# Patient Record
Sex: MALE | Race: WHITE | Employment: OTHER | URBAN - METROPOLITAN AREA
[De-identification: names, ages, dates, MRNs, and addresses within clinical notes are randomized per-mention and may not be internally consistent; named-entity substitution may affect disease eponyms.]

---

## 2017-12-04 ASSESSMENT — LIFESTYLE VARIABLES: SMOKING_STATUS: 1

## 2017-12-04 NOTE — ANESTHESIA PRE-OP
Department of Anesthesiology  Preprocedure Note       Name:  Rufina Hernandez   Age:  81 y.o.  :  1945                                          MRN:  [id number]         Date:  2017      Surgeon:    Procedure:    Medications prior to admission:   Prior to Admission medications    Medication Sig Start Date End Date Taking?  Authorizing Provider   glimepiride (AMARYL) 4 MG tablet Take 4 mg by mouth every morning (before breakfast)    Historical Provider, MD   lisinopril (PRINIVIL;ZESTRIL) 10 MG tablet Take 10 mg by mouth daily    Historical Provider, MD   minocycline (MINOCIN;DYNACIN) 100 MG capsule Take 100 mg by mouth daily    Historical Provider, MD   metFORMIN (GLUCOPHAGE-XR) 500 MG extended release tablet Take 2,000 mg by mouth Daily with supper    Historical Provider, MD   lovastatin (ALTOPREV) 40 MG extended release tablet Take 40 mg by mouth Daily with supper    Historical Provider, MD   pioglitazone (ACTOS) 45 MG tablet Take 45 mg by mouth Daily with supper    Historical Provider, MD   aspirin 81 MG tablet Take 81 mg by mouth daily    Historical Provider, MD   Multiple Vitamins-Minerals (CENTRUM SILVER 50+MEN PO) Take by mouth    Historical Provider, MD   naproxen (NAPROSYN) 500 MG tablet Take 500 mg by mouth 2 times daily as needed for Pain    Historical Provider, MD       Current medications:    Current Outpatient Prescriptions   Medication Sig Dispense Refill    glimepiride (AMARYL) 4 MG tablet Take 4 mg by mouth every morning (before breakfast)      lisinopril (PRINIVIL;ZESTRIL) 10 MG tablet Take 10 mg by mouth daily      minocycline (MINOCIN;DYNACIN) 100 MG capsule Take 100 mg by mouth daily      metFORMIN (GLUCOPHAGE-XR) 500 MG extended release tablet Take 2,000 mg by mouth Daily with supper      lovastatin (ALTOPREV) 40 MG extended release tablet Take 40 mg by mouth Daily with supper      pioglitazone (ACTOS) 45 MG tablet Take 45 mg by mouth Daily with supper      aspirin 81 MG tablet Take 81 mg by mouth daily      Multiple Vitamins-Minerals (CENTRUM SILVER 50+MEN PO) Take by mouth      naproxen (NAPROSYN) 500 MG tablet Take 500 mg by mouth 2 times daily as needed for Pain       No current facility-administered medications for this encounter. Allergies: Allergies   Allergen Reactions    Bicillin [Penicillin G Benzathine] Rash       Problem List:  There is no problem list on file for this patient. Past Medical History:        Diagnosis Date    Diabetes mellitus ([address]     Hyperlipidemia     Hypertension        Past Surgical History:        Procedure Laterality Date    CHOLECYSTECTOMY  1999    COLONOSCOPY  10/22/2003    polyp x1    COLONOSCOPY  2010    Normal exam    CYST INCISION AND DRAINAGE Right 2013    FRACTURE SURGERY Right 1969    tibia    FRACTURE SURGERY Left 1994    shoulder    FRACTURE SURGERY  2011    Nose & facial bones    HERNIA REPAIR  2022    Umbilical       Social History:    Social History   Substance Use Topics    Smoking status: Current Some Day Smoker     Types: Cigars    Smokeless tobacco: Never Used    Alcohol use Yes      Comment: occasionally                                Ready to quit: Not Answered  Counseling given: Not Answered      Vital Signs (Current): There were no vitals filed for this visit. BP Readings from Last 3 Encounters:   No data found for BP       NPO Status:                                                                                 BMI:   Wt Readings from Last 3 Encounters:   11/29/17 208 lb (94.3 kg)     There is no height or weight on file to calculate BMI.     Anesthesia Evaluation  Patient summary reviewed no history of anesthetic complications:   Airway:         Dental:          Pulmonary:   (+) current smoker                           Cardiovascular:    (+) hypertension:,                   Neuro/Psych:               GI/Hepatic/Renal:             Endo/Other:    (+) Type II DM, , .                 Abdominal:           Vascular:                                        Anesthesia Plan      general, MAC and TIVA     ASA 2     (Chart review only.)  Induction: intravenous. Danni MillwoodRAJ   12/4/2017      =====================  Addendum  ================================    Patient records were reviewed and the patient seen and evaluated. Most Recent Results:  /71   Pulse 76   Temp 96.9 °F (36.1 °C) (Temporal)   Resp 16   Ht 5' 10\" (1.778 m)   Wt 197 lb (89.4 kg)   SpO2 100%   BMI 28.27 kg/m²     Lab Results   Component Value Date/Time    WBC 9.3 10/10/2011 05:02 PM    HGB 14.2 10/10/2011 05:02 PM    HCT 41.8 (L) 10/10/2011 05:02 PM     10/10/2011 05:02 PM     10/10/2011 05:02 PM    K 4.5 10/10/2011 05:02 PM     10/10/2011 05:02 PM    CO2 27 10/10/2011 05:02 PM    BUN 16 10/10/2011 05:02 PM    CREATININE 0.8 (L) 10/10/2011 05:02 PM       Anesthesiology Focused Physical examination:   Airway:  MP2   Cardiac: RRR, no murmur   Pulmonary: Clear    Assessment:  · Beta-blockers: none  · NPO- per guidelines, confirmed  · Physical status: 2    Plan:  · Proceed with surgery  · MAC/TIVA  · Antibiotics-none indicated. Informed consent obtained. All questions were addressed.  The above has been discussed with the CRNA involved in this patient's anesthesia care team.    Tristan Foley MD

## 2017-12-05 ENCOUNTER — HOSPITAL ENCOUNTER (OUTPATIENT)
Dept: SURGERY | Age: 72
Discharge: HOME | End: 2017-12-05
Attending: SPECIALIST | Admitting: SPECIALIST

## 2017-12-05 VITALS
TEMPERATURE: 97.4 F | HEART RATE: 62 BPM | DIASTOLIC BLOOD PRESSURE: 79 MMHG | RESPIRATION RATE: 16 BRPM | BODY MASS INDEX: 28.2 KG/M2 | HEIGHT: 70 IN | SYSTOLIC BLOOD PRESSURE: 136 MMHG | WEIGHT: 197 LBS | OXYGEN SATURATION: 100 %

## 2017-12-05 LAB — GLUCOSE BLD-MCNC: 144 MG/DL (ref 70–99)

## 2017-12-05 RX ORDER — SODIUM CHLORIDE, SODIUM LACTATE, POTASSIUM CHLORIDE, CALCIUM CHLORIDE 600; 310; 30; 20 MG/100ML; MG/100ML; MG/100ML; MG/100ML
INJECTION, SOLUTION INTRAVENOUS CONTINUOUS
Status: DISCONTINUED | OUTPATIENT
Start: 2017-12-05 | End: 2017-12-06 | Stop reason: HOSPADM

## 2017-12-05 RX ADMIN — SODIUM CHLORIDE, SODIUM LACTATE, POTASSIUM CHLORIDE, CALCIUM CHLORIDE: 600; 310; 30; 20 INJECTION, SOLUTION INTRAVENOUS at 10:31

## 2017-12-05 ASSESSMENT — PAIN SCALES - GENERAL
PAINLEVEL_OUTOF10: 0
PAINLEVEL_OUTOF10: 0

## 2017-12-05 ASSESSMENT — PAIN - FUNCTIONAL ASSESSMENT: PAIN_FUNCTIONAL_ASSESSMENT: 0-10

## 2017-12-05 NOTE — ANESTHESIA POST-OP
Anesthesia Post-op Note    Patient:    Ana Brooks  MRN:     [id number]   YOB: 1945    Anesthesia Post Evaluation    Final anesthesia type:  MAC-TIVA  Location of evaluation:  [facility]  Patient participation:   complete - patient participated  Level of consciousness:  awake  Pain score:    0  Airway patency:   patent  Nausea & Vomiting:   no nausea and no vomiting  Complications:  no  Cardiovascular status:  blood pressure returned to baseline  Respiratory status:   acceptable  Hydration status:   euvolemic    Que Maguire MD

## 2017-12-05 NOTE — H&P
Original H &P in soft chart. I have examined the patient immediately before the procedure and there is no change in the previous history  and physical exam, which has been reviewed. There is no history of sleep apnea, snoring, or stridor. There has been no  previous adverse experience with sedation/anesthesia. There is no increased risk for aspiration of gastric contents. The patient has been instructed that all resuscitative measures will be instituted in the unlikely event that they will be needed.     ASA Class: 2  AIRWAY Class: 1

## 2020-01-27 ENCOUNTER — PROCEDURE VISIT (OUTPATIENT)
Dept: CARDIOLOGY CLINIC | Age: 75
End: 2020-01-27
Payer: MEDICARE

## 2020-01-27 VITALS
HEART RATE: 90 BPM | DIASTOLIC BLOOD PRESSURE: 60 MMHG | BODY MASS INDEX: 29.7 KG/M2 | HEIGHT: 68 IN | WEIGHT: 196 LBS | SYSTOLIC BLOOD PRESSURE: 136 MMHG

## 2020-01-27 LAB
LV EF: 58 %
LVEF MODALITY: NORMAL

## 2020-01-27 PROCEDURE — 93306 TTE W/DOPPLER COMPLETE: CPT | Performed by: INTERNAL MEDICINE

## 2020-01-27 PROCEDURE — 93015 CV STRESS TEST SUPVJ I&R: CPT | Performed by: INTERNAL MEDICINE

## 2020-07-29 ENCOUNTER — NURSE ONLY (OUTPATIENT)
Dept: CARDIOLOGY CLINIC | Age: 75
End: 2020-07-29
Payer: MEDICARE

## 2020-07-29 PROCEDURE — 93225 XTRNL ECG REC<48 HRS REC: CPT | Performed by: INTERNAL MEDICINE

## 2020-07-29 NOTE — PROGRESS NOTES
Applied @ 5P. M., hr holter w/monitor# [id number] for Dx of pre-syncope. Educated pt on proper holter usage; how to keep sx diary; & when to bring monitor back to office. Pt voiced understanding. Holter order,including monitor & card#, & time started, to front nurse's station in 's in-box.

## 2020-08-05 PROCEDURE — 93227 XTRNL ECG REC<48 HR R&I: CPT | Performed by: INTERNAL MEDICINE

## 2021-07-30 LAB
ALBUMIN SERPL-MCNC: 3.5 G/DL
ALP BLD-CCNC: 204 U/L
ALT SERPL-CCNC: 55 U/L
ANION GAP SERPL CALCULATED.3IONS-SCNC: NORMAL MMOL/L
AST SERPL-CCNC: 51 U/L
BASOPHILS ABSOLUTE: 0 /ΜL
BASOPHILS RELATIVE PERCENT: 0.2 %
BILIRUB SERPL-MCNC: 0.6 MG/DL (ref 0.1–1.4)
BUN BLDV-MCNC: 11 MG/DL
CALCIUM SERPL-MCNC: 8.7 MG/DL
CHLORIDE BLD-SCNC: 96 MMOL/L
CO2: 29 MMOL/L
CREAT SERPL-MCNC: 0.9 MG/DL
EOSINOPHILS ABSOLUTE: 0.1 /ΜL
EOSINOPHILS RELATIVE PERCENT: 1.1 %
GFR CALCULATED: NORMAL
GLUCOSE BLD-MCNC: 351 MG/DL
HCT VFR BLD CALC: 34.4 % (ref 41–53)
HEMOGLOBIN: 11.4 G/DL (ref 13.5–17.5)
LYMPHOCYTES ABSOLUTE: 1.5 /ΜL
LYMPHOCYTES RELATIVE PERCENT: 14.8 %
MCH RBC QN AUTO: 30.6 PG
MCHC RBC AUTO-ENTMCNC: 33.1 G/DL
MCV RBC AUTO: 92.5 FL
MONOCYTES ABSOLUTE: 0.9 /ΜL
MONOCYTES RELATIVE PERCENT: 8.5 %
NEUTROPHILS ABSOLUTE: 7.8 /ΜL
NEUTROPHILS RELATIVE PERCENT: 74.7 %
PLATELET # BLD: 304 K/ΜL
PMV BLD AUTO: ABNORMAL FL
POTASSIUM SERPL-SCNC: 4.4 MMOL/L
RBC # BLD: 3.72 10^6/ΜL
SODIUM BLD-SCNC: 134 MMOL/L
TOTAL PROTEIN: 6
WBC # BLD: 10.4 10^3/ML

## 2021-08-23 ENCOUNTER — PROCEDURE VISIT (OUTPATIENT)
Dept: CARDIOLOGY CLINIC | Age: 76
End: 2021-08-23
Payer: MEDICARE

## 2021-08-23 DIAGNOSIS — R55 SYNCOPE, UNSPECIFIED SYNCOPE TYPE: ICD-10-CM

## 2021-08-23 DIAGNOSIS — E78.2 MIXED HYPERLIPIDEMIA: ICD-10-CM

## 2021-08-23 DIAGNOSIS — R07.9 CHEST PAIN, UNSPECIFIED TYPE: Primary | ICD-10-CM

## 2021-08-23 DIAGNOSIS — I10 ESSENTIAL HYPERTENSION: ICD-10-CM

## 2021-08-23 LAB
LV EF: 55 %
LVEF MODALITY: NORMAL

## 2021-08-23 PROCEDURE — 78452 HT MUSCLE IMAGE SPECT MULT: CPT | Performed by: INTERNAL MEDICINE

## 2021-08-23 PROCEDURE — 93015 CV STRESS TEST SUPVJ I&R: CPT | Performed by: INTERNAL MEDICINE

## 2021-08-23 PROCEDURE — A9500 TC99M SESTAMIBI: HCPCS | Performed by: INTERNAL MEDICINE

## 2021-08-23 NOTE — PROGRESS NOTES
8: 17 AM    8/23/21    Site: forearm right, condition patent and no redness    # of attempts: 1
patient's belongings returned

## 2021-08-24 ENCOUNTER — TELEPHONE (OUTPATIENT)
Dept: CARDIOLOGY CLINIC | Age: 76
End: 2021-08-24

## 2021-08-24 NOTE — TELEPHONE ENCOUNTER
Faxed results to Dr. Deondre Cook office. Called office and spoke to them about patient's results and doctor recommendations. States they will let patient know. Abnormal Study. Limited exercise capacity. 5 METs work load. Physiological BP response to exercise. ETT negative for Ischemia / Arrhythmia. Medium sized area of moderate inferior wall Ischemia. Normal LV function. LVEF is 55 %. Supervising physician Dr. Sreedhar Francis . Recommendations: Schedule out patient visit to discuss results.
5

## 2021-08-30 ENCOUNTER — INITIAL CONSULT (OUTPATIENT)
Dept: CARDIOLOGY CLINIC | Age: 76
End: 2021-08-30
Payer: MEDICARE

## 2021-08-30 VITALS
HEART RATE: 94 BPM | DIASTOLIC BLOOD PRESSURE: 78 MMHG | HEIGHT: 68 IN | BODY MASS INDEX: 27.04 KG/M2 | SYSTOLIC BLOOD PRESSURE: 148 MMHG | WEIGHT: 178.4 LBS

## 2021-08-30 DIAGNOSIS — I10 ESSENTIAL HYPERTENSION: ICD-10-CM

## 2021-08-30 DIAGNOSIS — R55 SYNCOPE, UNSPECIFIED SYNCOPE TYPE: ICD-10-CM

## 2021-08-30 DIAGNOSIS — E78.2 MIXED HYPERLIPIDEMIA: ICD-10-CM

## 2021-08-30 DIAGNOSIS — R07.9 CHEST PAIN, UNSPECIFIED TYPE: ICD-10-CM

## 2021-08-30 DIAGNOSIS — Z01.810 PRE-OPERATIVE CARDIOVASCULAR EXAMINATION: Primary | ICD-10-CM

## 2021-08-30 DIAGNOSIS — E11.9 TYPE 2 DIABETES MELLITUS WITHOUT COMPLICATION, WITHOUT LONG-TERM CURRENT USE OF INSULIN (HCC): ICD-10-CM

## 2021-08-30 PROCEDURE — G8417 CALC BMI ABV UP PARAM F/U: HCPCS | Performed by: INTERNAL MEDICINE

## 2021-08-30 PROCEDURE — 2022F DILAT RTA XM EVC RTNOPTHY: CPT | Performed by: INTERNAL MEDICINE

## 2021-08-30 PROCEDURE — 99204 OFFICE O/P NEW MOD 45 MIN: CPT | Performed by: INTERNAL MEDICINE

## 2021-08-30 PROCEDURE — G8427 DOCREV CUR MEDS BY ELIG CLIN: HCPCS | Performed by: INTERNAL MEDICINE

## 2021-08-30 RX ORDER — SITAGLIPTIN 100 MG/1
1 TABLET, FILM COATED ORAL DAILY
COMMUNITY
Start: 2021-08-02

## 2021-08-30 RX ORDER — ISOSORBIDE MONONITRATE 30 MG/1
30 TABLET, EXTENDED RELEASE ORAL DAILY
Qty: 30 TABLET | Refills: 3 | Status: ON HOLD | OUTPATIENT
Start: 2021-08-30 | End: 2021-09-14 | Stop reason: HOSPADM

## 2021-08-30 RX ORDER — PRAVASTATIN SODIUM 40 MG
1 TABLET ORAL DAILY
COMMUNITY
Start: 2021-08-28

## 2021-08-30 RX ORDER — NITROGLYCERIN 0.4 MG/1
TABLET SUBLINGUAL PRN
Status: ON HOLD | COMMUNITY
Start: 2021-08-26 | End: 2021-09-14 | Stop reason: HOSPADM

## 2021-08-30 RX ORDER — LATANOPROST 50 UG/ML
1 SOLUTION/ DROPS OPHTHALMIC DAILY
COMMUNITY
Start: 2021-07-15

## 2021-08-30 RX ORDER — TAMSULOSIN HYDROCHLORIDE 0.4 MG/1
0.8 CAPSULE ORAL DAILY
COMMUNITY
Start: 2021-07-23 | End: 2022-09-07

## 2021-08-30 NOTE — ASSESSMENT & PLAN NOTE
Advised to check blood pressure at home start metoprolol 25 twice daily for cardiac protection and to help with angina

## 2021-08-30 NOTE — PATIENT INSTRUCTIONS
**It is YOUR responsibilty to bring medication bottles and/or updated medication list to [address] [place] [address]. This will allow us to better serve you and all your healthcare needs**      Please be informed that if you contact our office outside of normal business hours the physician on call cannot help with any scheduling or rescheduling issues, procedure instruction questions or any type of medication issue. We advise you for any urgent/emergency that you go to the nearest emergency room!     PLEASE CALL OUR OFFICE DURING NORMAL BUSINESS HOURS    Monday - Friday   8 am to 5 pm    [place]: [address]: [phone]    [place]:  [phone]

## 2021-08-30 NOTE — ASSESSMENT & PLAN NOTE
Stress test shows inferior ischemia we went over the findings stress test results were reviewed and images were reviewed. .  Also start metoprolol 25 mg twice daily and Imdur will proceed with cardiac catheterization to evaluate coronary anatomy  Continue nitro as needed we went over the pathophysiology and risk factor modification

## 2021-08-30 NOTE — PROGRESS NOTES
CARDIOLOGY CONSULT   NOTE    Chief Complaint: Chest pain abnormal stress test    HPI:   Rosenda Elder is a 78y.o. year old who has Past medical history as noted below. Very pleasant gentleman who comes in for evaluationDue to concerns for intermittent chest discomfort radiating to his jaw and back and got worse with no clear exertion factors he underwent stress test which showed inferior ischemia although he completed 5 METS. he has history of diabetes.   Due to ongoing palpitations he had Holter monitor in July 2020 which did not show any significant arrhythmias  Blood pressure is elevated today but he says normally his blood pressure is not elevated  He is on nitro as needed but has not needed to use it so far      Current Outpatient Medications   Medication Sig Dispense Refill    latanoprost (XALATAN) 0.005 % ophthalmic solution Apply 1 drop to eye daily      nitroGLYCERIN (NITROSTAT) 0.4 MG SL tablet as needed      pravastatin (PRAVACHOL) 40 MG tablet 1 tablet daily      tamsulosin (FLOMAX) 0.4 MG capsule Take 0.8 mg by mouth daily      JANUVIA 100 MG tablet 1 tablet daily      Coenzyme Q10 (CO Q 10 PO) Take by mouth daily      Multiple Vitamins-Minerals (OCUVITE ADULT 50+ PO) Take by mouth daily      metoprolol tartrate (LOPRESSOR) 25 MG tablet Take 1 tablet by mouth 2 times daily 60 tablet 0    isosorbide mononitrate (IMDUR) 30 MG extended release tablet Take 1 tablet by mouth daily 30 tablet 3    glimepiride (AMARYL) 4 MG tablet Take 4 mg by mouth every morning (before breakfast)      lisinopril (PRINIVIL;ZESTRIL) 5 MG tablet Take 5 mg by mouth daily       minocycline (MINOCIN;DYNACIN) 100 MG capsule Take 100 mg by mouth daily      metFORMIN (GLUCOPHAGE-XR) 500 MG extended release tablet Take 2,000 mg by mouth Daily with supper      pioglitazone (ACTOS) 45 MG tablet Take 45 mg by mouth Daily with supper      aspirin 81 MG tablet Take 81 mg by mouth daily  Multiple Vitamins-Minerals (CENTRUM SILVER 50+MEN PO) Take by mouth      naproxen (NAPROSYN) 500 MG tablet Take 500 mg by mouth 2 times daily as needed for Pain       No current facility-administered medications for this visit. Allergies:   Clarithromycin, Levaquin [levofloxacin], and Bicillin [penicillin g benzathine]    Patient History:  Past Medical History:   Diagnosis Date    Abnormal Heart Score CT 03/04/2005    Diabetes mellitus ([address]     Elevated d-dimer 01/16/2020    Fatigue 06/18/2018    H/O echocardiogram 01/27/2020    EF 19-12, Grade I diastolic dysfunction.  History of exercise stress test 01/27/2020    Treadmill, normal stress test, THR achieved    History of nuclear stress test 08/23/2021    EF 55%, ETT negative for ischemia/arrhythmia, Medium sized area of moderate inferior wall ischemia    Hyperlipidemia     Hypertension     Obesity 02/15/2000    Psoriasis 03/09/2004     Past Surgical History:   Procedure Laterality Date    CHOLECYSTECTOMY  1999    COLONOSCOPY  10/22/2003    polyp x1    COLONOSCOPY  2010    Normal exam    COLONOSCOPY  12/05/2017    single 2mm polyp found in proximal ascending colon, diverticulosis found in sigmoid colon    CYST INCISION AND DRAINAGE Right 2013    FRACTURE SURGERY Right 1969    tibia    FRACTURE SURGERY Left 1994    shoulder    FRACTURE SURGERY  2011    Nose & facial bones    HERNIA REPAIR  6383    Umbilical     Family History   Problem Relation Age of Onset    Stroke Mother     Diabetes Father     Diabetes Brother     Diabetes Paternal Grandmother     Diabetes Brother     Diabetes Brother      Social History     Tobacco Use    Smoking status: Current Some Day Smoker     Types: Cigars    Smokeless tobacco: Never Used    Tobacco comment: also former cigarette smoker.  8/30/2021   Substance Use Topics    Alcohol use: Yes     Comment: occasionally        Review of Systems:   · Constitutional: No Fever or Weight Loss   · Eyes: No Decreased Vision  · ENT: No Headaches, Hearing Loss or Vertigo  · Cardiovascular: as per note above   · Respiratory: No cough or wheezing and as per note above. · Gastrointestinal: No abdominal pain, appetite loss, blood in stools, constipation, diarrhea or heartburn  · Genitourinary: No dysuria, trouble voiding, or hematuria  · Musculoskeletal:  denies any new  joint aches , swelling  or pain   · Integumentary: No rash or pruritis  · Neurological: No TIA or stroke symptoms  · Psychiatric: No anxiety or depression  · Endocrine: No malaise, fatigue or temperature intolerance  · Hematologic/Lymphatic: No bleeding problems, blood clots or swollen lymph nodes  · Allergic/Immunologic: No nasal congestion or hives    Objective:      Physical Exam:  BP (!) 148/78 (Site: Left Upper Arm, Position: Sitting, Cuff Size: Medium Adult)   Pulse 94   Ht 5' 8\" (1.727 m)   Wt 178 lb 6.4 oz (80.9 kg)   BMI 27.13 kg/m²   Wt Readings from Last 3 Encounters:   08/30/21 178 lb 6.4 oz (80.9 kg)   01/27/20 196 lb (88.9 kg)   12/05/17 197 lb (89.4 kg)     Body mass index is 27.13 kg/m². Vitals:    08/30/21 1201   BP: (!) 148/78   Pulse:         General Appearance:  No distress, conversant  Constitutional:  Well developed, Well nourished, No acute distress, Non-toxic appearance. HENT:  Normocephalic, Atraumatic, Bilateral external ears normal, Oropharynx moist, No oral exudates, Nose normal. Neck- Normal range of motion, No tenderness, Supple, No stridor,no apical-carotid delay  Eyes:  PERRL, EOMI, Conjunctiva normal, No discharge. Respiratory:  Normal breath sounds, No respiratory distress, No wheezing, No chest tenderness. ,no use of accessory muscles, NO crackles  Cardiovascular: (PMI) apex non displaced,no lifts no thrills,S1 and S2 audible, No added heart sounds, No signs of ankle edema, or volume overload, No evidence of JVD, No crackles  GI:  Bowel sounds normal, Soft, No tenderness, No masses, No gross visceromegaly   : No costovertebral angle tenderness   Musculoskeletal:  No edema, no tenderness, no deformities. Back- no tenderness  Integument:  Well hydrated, no rash   Lymphatic:  No lymphadenopathy noted   Neurologic:  Alert & oriented x 3, CN 2-12 normal, normal motor function, normal sensory function, no focal deficits noted   Psychiatric:  Speech and behavior appropriate       Medical decision making and Data review:  DATA:  No results found for: TROPONINT  BNP:  No results found for: PROBNP  PT/INR:  No results found for: PTINR  No results found for: LABA1C  No results found for: CHOL, TRIG, HDL, LDLCALC, LDLDIRECT  Lab Results   Component Value Date    ALT 55 07/30/2021    AST 51 07/30/2021     No results for input(s): WBC, HGB, HCT, MCV, PLT in the last 72 hours. TSH: No results found for: TSH  Lab Results   Component Value Date    AST 51 07/30/2021    ALT 55 07/30/2021    BILITOT 0.6 07/30/2021    ALKPHOS 204 07/30/2021     No results found for: PROBNP  No results found for: LABA1C  Lab Results   Component Value Date    WBC 10.4 07/30/2021    HGB 11.4 (A) 07/30/2021    HCT 34.4 (A) 07/30/2021     07/30/2021     Stress test  8/23/21      Summary    Abnormal Study.    Limited exercise capacity. 5 METs work load.    Physiological BP response to exercise.    ETT negative for Ischemia / Arrhythmia.    Medium sized area of moderate inferior wall Ischemia.    Normal LV function. LVEF is 55 %.    Supervising physician Dr. Chi Beaver .                 All labs, medications and tests reviewed by myself including data and history from outside source , patient and available family . Assessment & Plan:      1. Pre-operative cardiovascular examination    2. Chest pain, unspecified type    3. Syncope, unspecified syncope type    4. Mixed hyperlipidemia    5. Essential hypertension    6.  Type 2 diabetes mellitus without complication, without long-term current use of insulin (HCC)         Chest pain   Stress test shows inferior ischemia we went over the findings stress test results were reviewed and images were reviewed. .  Also start metoprolol 25 mg twice daily and Imdur will proceed with cardiac catheterization to evaluate coronary anatomy  Continue nitro as needed we went over the pathophysiology and risk factor modification  Alternates and risk of the procedure were dicussed in detail  Patient is in agreement to proceed  Mallampati is 2  ASA is 3    Hypertension   Advised to check blood pressure at home start metoprolol 25 twice daily for cardiac protection and to help with angina    Di[address] mellitus [address] [address]   Followed closely by Dr. Matteo Saini     Dyslipidemia :  All available lab work was reviewed. Patient was advised to repeat lab work before next visit. Necessary orders were placed , instructions given by myself       Counseled extensively and medication compliance urged. We discussed that for the  prevention of ASCVD our  goal is aggressive risk modification. Patient is encouraged to exercise if they can , educated about  brisk walk for 30 minutes  at least 3 to 4 times a week if there are no physical limitations  Various goals were discussed and questions answered. Continue current medications. Appropriate prescriptions are addressed and refills ordered. Questions answered and patient verbalizes understanding. Call for any problems, questions, or concerns. Greater than 60 % of time spent counseling besides reviewing data and images     Continue all other medications of all above medical condition listed as is. Return in about 1 month (around 9/30/2021). Please note this report has been partially produced using speech recognition software and may contain errors related to that system including errors in grammar, punctuation, and spelling, as well as words and phrases that may be inappropriate. If there are any questions or concerns please feel free to contact the dictating provider for clarification.

## 2021-08-30 NOTE — LETTER
Edgardo Velasco [birth year]  1945  [id number]    Have you had any Chest Pain that is not new? - Yes, ongoing, occasional, worsened over past 6 months  If Yes DO EKG - How does it feel - Tightness   How long does the pain last - 2 minutes    How long have you been having the pain - Months   Did you take a NONE   And did it relieve the pain - it goes away on it's own    Have you had any Shortness of Breath - Yes  If Yes - When on exertion    Have you had any dizziness - Yes  If Yes DO ORTHOSTATIC BP - when do you feel dizzy with position change, walking, exertion  How long does it last .would continue as long as pt was walking  ? Have you had any palpitations that are not new? - Yes  If Yes DO EKG - Do you feel your heart pounding  How long does it last - .3  seconds     Do you have any edema - swelling in No    If Yes - CHECK TO SEE IF THE EDEMA IS PITTING    When did you have your last labs drawn 7/2021, Lipids not in [facility] or Care Everywhere  Where did you have them done DR. Michael Benjamin  What doctor ordered Dr. Michael Benjamin    If we do not have these labs you are retrieve these labs for these providers!     Do you have a surgery or procedure scheduled in the near future - No  If Yes- DO EKG

## 2021-09-02 ENCOUNTER — NURSE ONLY (OUTPATIENT)
Dept: CARDIOLOGY CLINIC | Age: 76
End: 2021-09-02
Payer: MEDICARE

## 2021-09-02 ENCOUNTER — HOSPITAL ENCOUNTER (OUTPATIENT)
Age: 76
Discharge: HOME | End: 2021-09-02
Payer: MEDICARE

## 2021-09-02 ENCOUNTER — HOSPITAL ENCOUNTER (OUTPATIENT)
Age: 76
Setting detail: SPECIMEN
Discharge: HOME | End: 2021-09-02
Payer: MEDICARE

## 2021-09-02 ENCOUNTER — HOSPITAL ENCOUNTER (OUTPATIENT)
Dept: GENERAL RADIOLOGY | Age: 76
Discharge: HOME | End: 2021-09-02
Payer: MEDICARE

## 2021-09-02 VITALS — TEMPERATURE: 96.3 F | DIASTOLIC BLOOD PRESSURE: 76 MMHG | SYSTOLIC BLOOD PRESSURE: 128 MMHG

## 2021-09-02 DIAGNOSIS — Z01.810 PRE-OPERATIVE CARDIOVASCULAR EXAMINATION: ICD-10-CM

## 2021-09-02 LAB
ABO/RH: NORMAL
ANION GAP SERPL CALCULATED.3IONS-SCNC: 10 MMOL/L (ref 4–16)
ANTIBODY SCREEN: NEGATIVE
BASOPHILS ABSOLUTE: 0 K/CU MM
BASOPHILS RELATIVE PERCENT: 0.3 % (ref 0–1)
BUN BLDV-MCNC: 19 MG/DL (ref 6–23)
CALCIUM SERPL-MCNC: 9.5 MG/DL (ref 8.3–10.6)
CHLORIDE BLD-SCNC: 101 MMOL/L (ref 99–110)
CO2: 27 MMOL/L (ref 21–32)
COMMENT: NORMAL
CREAT SERPL-MCNC: 1.3 MG/DL (ref 0.9–1.3)
DIFFERENTIAL TYPE: ABNORMAL
EOSINOPHILS ABSOLUTE: 0.1 K/CU MM
EOSINOPHILS RELATIVE PERCENT: 2.1 % (ref 0–3)
GFR AFRICAN AMERICAN: >60 ML/MIN/1.73M2
GFR NON-AFRICAN AMERICAN: 54 ML/MIN/1.73M2
GLUCOSE BLD-MCNC: 230 MG/DL (ref 70–99)
HCT VFR BLD CALC: 37.8 % (ref 42–52)
HEMOGLOBIN: 12.2 GM/DL (ref 13.5–18)
IMMATURE NEUTROPHIL %: 0.3 % (ref 0–0.43)
LYMPHOCYTES ABSOLUTE: 1.5 K/CU MM
LYMPHOCYTES RELATIVE PERCENT: 25.4 % (ref 24–44)
MCH RBC QN AUTO: 30.3 PG (ref 27–31)
MCHC RBC AUTO-ENTMCNC: 32.3 % (ref 32–36)
MCV RBC AUTO: 94 FL (ref 78–100)
MONOCYTES ABSOLUTE: 0.5 K/CU MM
MONOCYTES RELATIVE PERCENT: 8.4 % (ref 0–4)
NUCLEATED RBC %: 0 %
PDW BLD-RTO: 13 % (ref 11.7–14.9)
PLATELET # BLD: 145 K/CU MM (ref 140–440)
PMV BLD AUTO: 11.5 FL (ref 7.5–11.1)
POTASSIUM SERPL-SCNC: 5.3 MMOL/L (ref 3.5–5.1)
RBC # BLD: 4.02 M/CU MM (ref 4.6–6.2)
SEGMENTED NEUTROPHILS ABSOLUTE COUNT: 3.7 K/CU MM
SEGMENTED NEUTROPHILS RELATIVE PERCENT: 63.5 % (ref 36–66)
SODIUM BLD-SCNC: 138 MMOL/L (ref 135–145)
TOTAL IMMATURE NEUTOROPHIL: 0.02 K/CU MM
TOTAL NUCLEATED RBC: 0 K/CU MM
WBC # BLD: 5.8 K/CU MM (ref 4–10.5)

## 2021-09-02 PROCEDURE — 36415 COLL VENOUS BLD VENIPUNCTURE: CPT

## 2021-09-02 PROCEDURE — 80048 BASIC METABOLIC PNL TOTAL CA: CPT

## 2021-09-02 PROCEDURE — 86900 BLOOD TYPING SEROLOGIC ABO: CPT

## 2021-09-02 PROCEDURE — 86901 BLOOD TYPING SEROLOGIC RH(D): CPT

## 2021-09-02 PROCEDURE — U0005 INFEC AGEN DETEC AMPLI PROBE: HCPCS

## 2021-09-02 PROCEDURE — 86850 RBC ANTIBODY SCREEN: CPT

## 2021-09-02 PROCEDURE — 99211 OFF/OP EST MAY X REQ PHY/QHP: CPT | Performed by: INTERNAL MEDICINE

## 2021-09-02 PROCEDURE — 85025 COMPLETE CBC W/AUTO DIFF WBC: CPT

## 2021-09-02 PROCEDURE — 71046 X-RAY EXAM CHEST 2 VIEWS: CPT

## 2021-09-02 PROCEDURE — U0003 INFECTIOUS AGENT DETECTION BY NUCLEIC ACID (DNA OR RNA); SEVERE ACUTE RESPIRATORY SYNDROME CORONAVIRUS 2 (SARS-COV-2) (CORONAVIRUS DISEASE [COVID-19]), AMPLIFIED PROBE TECHNIQUE, MAKING USE OF HIGH THROUGHPUT TECHNOLOGIES AS DESCRIBED BY CMS-2020-01-R: HCPCS

## 2021-09-02 NOTE — PROGRESS NOTES
Patient informed of instructions and guidance for performing test.  Throat swab performed. Swab placed into labeled collection tube. Collection tube and lab order placed in plastic bag. Bag placed in biohazard bag and placed in fridge.  called for . Patient here in office & educated on [facility] for Dx: ABN NM, scheduled for 9/8/21 @ 12, w/arrival @ 10 AM, @ [facility]. Risks explained; & consents signed. Pre-admission orders given to pt for labs & CXR, which are due 9/2/21 OR 9/3/21 @ [facility]. Instructions given to pt to: yuliana QURESHI after midnight the night before procedure. Patient to call hospital @ [phone] to pre-register. May take morning meds the morning of procedure. Patient was notified that procedure could be delayed due to an emergency. Patient voiced understanding. Copies of consent, pre-testing orders, & info. sheet scanned into media.

## 2021-09-03 LAB
SARS-COV-2: NOT DETECTED
SOURCE: NORMAL

## 2021-09-07 ENCOUNTER — TELEPHONE (OUTPATIENT)
Dept: CARDIOLOGY CLINIC | Age: 76
End: 2021-09-07

## 2021-09-07 DIAGNOSIS — E78.2 MIXED HYPERLIPIDEMIA: Primary | ICD-10-CM

## 2021-09-07 NOTE — TELEPHONE ENCOUNTER
----- Message from Carrie Ahumada MD sent at 9/2/2021  5:01 PM EDT -----  Repeat k on day of procedure

## 2021-09-07 NOTE — TELEPHONE ENCOUNTER
Spoke with Ed Zamora and was told to put new order for BMP to have patient get labs tomorrow prior to procedure scheduled for noon. Order is in.

## 2021-09-07 NOTE — TELEPHONE ENCOUNTER
Notified patient of potassium results along with being retested tomorrow before procedure. Covid results also given.

## 2021-09-08 ENCOUNTER — HOSPITAL ENCOUNTER (OUTPATIENT)
Dept: CARDIAC CATH/INVASIVE PROCEDURES | Age: 76
Discharge: HOME | DRG: 234 | End: 2021-09-08
Attending: INTERNAL MEDICINE | Admitting: INTERNAL MEDICINE
Payer: MEDICARE

## 2021-09-08 ENCOUNTER — APPOINTMENT (OUTPATIENT)
Dept: CT IMAGING | Age: 76
DRG: 234 | End: 2021-09-08
Attending: INTERNAL MEDICINE
Payer: MEDICARE

## 2021-09-08 VITALS
DIASTOLIC BLOOD PRESSURE: 74 MMHG | TEMPERATURE: 96.2 F | WEIGHT: 180 LBS | BODY MASS INDEX: 27.28 KG/M2 | RESPIRATION RATE: 15 BRPM | HEIGHT: 68 IN | OXYGEN SATURATION: 100 % | SYSTOLIC BLOOD PRESSURE: 153 MMHG | HEART RATE: 70 BPM

## 2021-09-08 LAB
BASE EXCESS MIXED: 0.5 (ref 0–1.2)
CARBON MONOXIDE, BLOOD: 2.1 % (ref 0–5)
CO2 CONTENT: 27.4 MMOL/L (ref 19–24)
COMMENT: ABNORMAL
ESTIMATED AVERAGE GLUCOSE: 169 MG/DL
GLUCOSE BLD-MCNC: 140 MG/DL (ref 70–99)
HBA1C MFR BLD: 7.5 % (ref 4.2–6.3)
HCO3 ARTERIAL: 26 MMOL/L (ref 18–23)
LV EF: 53 %
LVEF MODALITY: NORMAL
METHEMOGLOBIN ARTERIAL: 1.3 %
O2 SATURATION: 92.6 % (ref 96–97)
PCO2 ARTERIAL: 44 MMHG (ref 32–45)
PH BLOOD: 7.38 (ref 7.34–7.45)
PO2 ARTERIAL: 68 MMHG (ref 75–100)
POC CALCIUM: 1.21 MMOL/L (ref 1.12–1.32)
POC CHLORIDE: 105 MMOL/L (ref 98–109)
POC CREATININE: 0.8 MG/DL (ref 0.9–1.3)
POTASSIUM SERPL-SCNC: 4 MMOL/L (ref 3.5–4.5)
SODIUM BLD-SCNC: 139 MMOL/L (ref 138–146)
SOURCE, BLOOD GAS: ABNORMAL

## 2021-09-08 PROCEDURE — 6360000002 HC RX W HCPCS

## 2021-09-08 PROCEDURE — 2500000003 HC RX 250 WO HCPCS

## 2021-09-08 PROCEDURE — 6360000004 HC RX CONTRAST MEDICATION

## 2021-09-08 PROCEDURE — 4A023N7 MEASUREMENT OF CARDIAC SAMPLING AND PRESSURE, LEFT HEART, PERCUTANEOUS APPROACH: ICD-10-PCS | Performed by: INTERNAL MEDICINE

## 2021-09-08 PROCEDURE — 93458 L HRT ARTERY/VENTRICLE ANGIO: CPT

## 2021-09-08 PROCEDURE — C1769 GUIDE WIRE: HCPCS

## 2021-09-08 PROCEDURE — 2580000003 HC RX 258: Performed by: INTERNAL MEDICINE

## 2021-09-08 PROCEDURE — B2111ZZ FLUOROSCOPY OF MULTIPLE CORONARY ARTERIES USING LOW OSMOLAR CONTRAST: ICD-10-PCS | Performed by: INTERNAL MEDICINE

## 2021-09-08 PROCEDURE — C1887 CATHETER, GUIDING: HCPCS

## 2021-09-08 PROCEDURE — 2709999900 HC NON-CHARGEABLE SUPPLY

## 2021-09-08 PROCEDURE — C1894 INTRO/SHEATH, NON-LASER: HCPCS

## 2021-09-08 PROCEDURE — 83036 HEMOGLOBIN GLYCOSYLATED A1C: CPT

## 2021-09-08 PROCEDURE — 82803 BLOOD GASES ANY COMBINATION: CPT

## 2021-09-08 PROCEDURE — 71250 CT THORAX DX C-: CPT

## 2021-09-08 PROCEDURE — 93458 L HRT ARTERY/VENTRICLE ANGIO: CPT | Performed by: INTERNAL MEDICINE

## 2021-09-08 PROCEDURE — 93306 TTE W/DOPPLER COMPLETE: CPT

## 2021-09-08 PROCEDURE — 6370000000 HC RX 637 (ALT 250 FOR IP): Performed by: INTERNAL MEDICINE

## 2021-09-08 RX ORDER — SODIUM CHLORIDE 9 MG/ML
25 INJECTION, SOLUTION INTRAVENOUS PRN
Status: DISCONTINUED | OUTPATIENT
Start: 2021-09-08 | End: 2021-09-08 | Stop reason: HOSPADM

## 2021-09-08 RX ORDER — SIMVASTATIN 40 MG
40 TABLET ORAL ONCE
Status: CANCELLED | OUTPATIENT
Start: 2021-09-10

## 2021-09-08 RX ORDER — CARVEDILOL 3.12 MG/1
3.12 TABLET ORAL ONCE
Status: CANCELLED | OUTPATIENT
Start: 2021-09-10

## 2021-09-08 RX ORDER — ACETAMINOPHEN 325 MG/1
650 TABLET ORAL EVERY 4 HOURS PRN
Status: DISCONTINUED | OUTPATIENT
Start: 2021-09-08 | End: 2021-09-08 | Stop reason: HOSPADM

## 2021-09-08 RX ORDER — HYDRALAZINE HYDROCHLORIDE 20 MG/ML
10 INJECTION INTRAMUSCULAR; INTRAVENOUS EVERY 10 MIN PRN
Status: DISCONTINUED | OUTPATIENT
Start: 2021-09-08 | End: 2021-09-08 | Stop reason: HOSPADM

## 2021-09-08 RX ORDER — ISOSORBIDE MONONITRATE 60 MG/1
60 TABLET, EXTENDED RELEASE ORAL DAILY
Qty: 90 TABLET | Refills: 3 | OUTPATIENT
Start: 2021-09-08

## 2021-09-08 RX ORDER — 0.9 % SODIUM CHLORIDE 0.9 %
500 INTRAVENOUS SOLUTION INTRAVENOUS ONCE
Status: DISCONTINUED | OUTPATIENT
Start: 2021-09-08 | End: 2021-09-08 | Stop reason: HOSPADM

## 2021-09-08 RX ORDER — DIPHENHYDRAMINE HCL 25 MG
25 TABLET ORAL ONCE
Status: COMPLETED | OUTPATIENT
Start: 2021-09-08 | End: 2021-09-08

## 2021-09-08 RX ORDER — SODIUM CHLORIDE 0.9 % (FLUSH) 0.9 %
5-40 SYRINGE (ML) INJECTION PRN
Status: DISCONTINUED | OUTPATIENT
Start: 2021-09-08 | End: 2021-09-08 | Stop reason: HOSPADM

## 2021-09-08 RX ORDER — SODIUM CHLORIDE 9 MG/ML
INJECTION, SOLUTION INTRAVENOUS CONTINUOUS
Status: DISCONTINUED | OUTPATIENT
Start: 2021-09-08 | End: 2021-09-08 | Stop reason: HOSPADM

## 2021-09-08 RX ORDER — SODIUM CHLORIDE 0.9 % (FLUSH) 0.9 %
5-40 SYRINGE (ML) INJECTION EVERY 12 HOURS SCHEDULED
Status: DISCONTINUED | OUTPATIENT
Start: 2021-09-08 | End: 2021-09-08 | Stop reason: HOSPADM

## 2021-09-08 RX ORDER — DIAZEPAM 5 MG/1
5 TABLET ORAL ONCE
Status: COMPLETED | OUTPATIENT
Start: 2021-09-08 | End: 2021-09-08

## 2021-09-08 RX ORDER — CHLORHEXIDINE GLUCONATE 0.12 MG/ML
30 RINSE ORAL ONCE
Status: CANCELLED | OUTPATIENT
Start: 2021-09-10

## 2021-09-08 RX ADMIN — DIAZEPAM 5 MG: 5 TABLET ORAL at 10:39

## 2021-09-08 RX ADMIN — SODIUM CHLORIDE: 9 INJECTION, SOLUTION INTRAVENOUS at 10:39

## 2021-09-08 RX ADMIN — DIPHENHYDRAMINE HYDROCHLORIDE 25 MG: 25 TABLET ORAL at 10:39

## 2021-09-08 NOTE — PROGRESS NOTES
Patient seen in cath lab holding for consult for CABG by Dr. Cierra Vasquez for Dr. Shahbaz Rolon. Dr. Cierra Vasquez in cath lab holding speaking to spouse and patient regarding the need for surgery and where blockages are located. Patient agreeable to surgery, awaiting Dr. Shahbaz Rolon to see patient.

## 2021-09-08 NOTE — PROGRESS NOTES
Dc instructions reviewed with pt and family pt verbalizes understanding. r radial drsg d/i no hematoma.

## 2021-09-08 NOTE — H&P
Clint Barber, 78 y.o., male    Primary care physician:  Bubba Bernard MD     Chief Complaint: Chest Pain    History of Present Illness:  Jairo Lopes is a 78y.o. year old who has Past medical history as noted below. Very pleasant gentleman who comes in for evaluationDue to concerns for intermittent chest discomfort radiating to his jaw and back and got worse with no clear exertion factors he underwent stress test which showed inferior ischemia although he completed 5 METS. he has history of diabetes. Due to ongoing palpitations he had Holter monitor in July 2020 which did not show any significant arrhythmias  Blood pressure is elevated today but he says normally his blood pressure is not elevated  He is on nitro as needed but has not needed to use it so far  Past medical history:    has a past medical history of Abnormal Heart Score CT, Diabetes mellitus ([facility] [address] Elevated d-dimer, Fatigue, H/O echocardiogram, History of exercise stress test, History of nuclear stress test, Hyperlipidemia, Hypertension, Obesity, and Psoriasis. Past surgical history:   has a past surgical history that includes fracture surgery (Right, 1969); fracture surgery (Left, 1994); fracture surgery (2011); Cholecystectomy (1999); hernia repair (1999); cyst incision and drainage (Right, 2013); Colonoscopy (10/22/2003); Colonoscopy (2010); and Colonoscopy (12/05/2017). Social History:   reports that he has been smoking cigars. He has never used smokeless tobacco. He reports current alcohol use. He reports that he does not use drugs. Family history:  family history includes Diabetes in his brother, brother, brother, father, and paternal grandmother; Stroke in his mother. Allergies: Allergies   Allergen Reactions    Clarithromycin     Levaquin [Levofloxacin] Hives    Bicillin [Penicillin G Benzathine] Rash       Home Medications:  Prior to Admission medications    Medication Sig Start Date End Date Taking?  Authorizing Provider   latanoprost (XALATAN) 0.005 % ophthalmic solution Apply 1 drop to eye daily 7/15/21  Yes Historical Provider, MD   pravastatin (PRAVACHOL) 40 MG tablet 1 tablet daily 8/28/21  Yes Historical Provider, MD   tamsulosin (FLOMAX) 0.4 MG capsule Take 0.8 mg by mouth daily 7/23/21  Yes Historical Provider, MD   JANUVIA 100 MG tablet 1 tablet daily 8/2/21  Yes Historical Provider, MD   Coenzyme Q10 (CO Q 10 PO) Take by mouth daily   Yes Historical Provider, MD   Multiple Vitamins-Minerals (OCUVITE ADULT 50+ PO) Take by mouth daily   Yes Historical Provider, MD   metoprolol tartrate (LOPRESSOR) 25 MG tablet Take 1 tablet by mouth 2 times daily 8/30/21  Yes Yojana Males, MD   isosorbide mononitrate (IMDUR) 30 MG extended release tablet Take 1 tablet by mouth daily 8/30/21  Yes Yojana Lawson, MD   glimepiride (AMARYL) 4 MG tablet Take 4 mg by mouth every morning (before breakfast)   Yes Historical Provider, MD   lisinopril (PRINIVIL;ZESTRIL) 5 MG tablet Take 5 mg by mouth daily    Yes Historical Provider, MD   minocycline (MINOCIN;DYNACIN) 100 MG capsule Take 100 mg by mouth daily   Yes Historical Provider, MD   metFORMIN (GLUCOPHAGE-XR) 500 MG extended release tablet Take 2,000 mg by mouth Daily with supper   Yes Historical Provider, MD   aspirin 81 MG tablet Take 81 mg by mouth daily   Yes Historical Provider, MD   Multiple Vitamins-Minerals (CENTRUM SILVER 50+MEN PO) Take by mouth   Yes Historical Provider, MD   nitroGLYCERIN (NITROSTAT) 0.4 MG SL tablet as needed 8/26/21   Historical Provider, MD   naproxen (NAPROSYN) 500 MG tablet Take 500 mg by mouth 2 times daily as needed for Pain    Historical Provider, MD       Review of systems: Review of Systems:   · Constitutional: No Fever or Weight Loss   · Eyes: No Decreased Vision  · ENT: No Headaches, Hearing Loss or Vertigo  · Cardiovascular: No chest pain, dyspnea on exertion, palpitations or loss of consciousness  · Respiratory: No cough or wheezing · Gastrointestinal: No abdominal pain, appetite loss, blood in stools, constipation, diarrhea or heartburn  · Genitourinary: No dysuria, trouble voiding, or hematuria  · Musculoskeletal:  No gait disturbance, weakness or joint complaints  · Integumentary: No rash or pruritis  · Neurological: No TIA or stroke symptoms  · Psychiatric: No anxiety or depression  · Endocrine: No malaise, fatigue or temperature intolerance  · Hematologic/Lymphatic: No bleeding problems, blood clots or swollen lymph nodes  Allergic/Immunologic: No nasal congestion or hives    Physical Examination:    BP (!) 150/65   Pulse 62   Temp 96.2 °F (35.7 °C) (Temporal)   Resp 12   Ht 5' 8\" (1.727 m)   Wt 180 lb (81.6 kg)   SpO2 99%   BMI 27.37 kg/m²      General Appearance:  No distress, conversant  Constitutional:  Well developed, Well nourished, No acute distress, Non-toxic appearance. HENT:  Normocephalic, Atraumatic, Bilateral external ears normal, Oropharynx moist, No oral exudates, Nose normal. Neck- Normal range of motion, No tenderness, Supple, No stridor,no apical-carotid delay  Eyes:  PERRL, EOMI, Conjunctiva normal, No discharge. Lymphatics: no palpable lymph nodes  Respiratory:  Normal breath sounds, No respiratory distress, No wheezing, No chest tenderness. ,no uise of accessory muscles, JVP not elevated  Cardiovascular: (PMI) apex non displaced,no lifts no thrills, no s3,no s4, Normal heart rate, Normal rhythm, No murmurs, No rubs, No gallops. GI:  Bowel sounds normal, Soft, No tenderness, No masses, No pulsatile masses, no hepatosplenomegally, no bruits  Musculoskeletal:  Intact distal pulses, No edema, No tenderness, No cyanosis, No clubbing. Good range of motion in all major joints. No tenderness to palpation or major deformities noted. Back- No tenderness. Integument:  Warm, Dry, No erythema, No rash. Skin: no rash, no ulcers  Lymphatic:  No lymphadenopathy noted.    Neurologic:  Alert & oriented x 3, Normal motor

## 2021-09-08 NOTE — CONSULTS
Department of Cardiovascular & Thoracic Surgery   Consult Note        Reason for Consult: Chest pain  Requesting Physician:  Romina att. providers found        Date of Consult: 09/08/21    Chief Complaint: Chest pain    History Obtained From:  patient, electronic medical record    HISTORY OF PRESENT ILLNESS:    The patient is a 78 y.o. male who presents with worsening intermittent chest pain over the past year. He underwent a stress test which showed inferior ischemia. He states his chest pain actually did improve with medical management however he underwent left heart catheterization which showed critical multivessel coronary disease. He is very active. He lives with his wife to leave the house daily. He has no ambulatory limitations. He does smoke cigars intermittently. He does not have rest pain. He has no history of heart failure. Patient is a diabetic. His hemoglobin A1c is 7.5. Patient's creatinine is 1.3. Past Medical History:        Diagnosis Date    Abnormal Heart Score CT 03/04/2005    Diabetes mellitus ([facility] [address]     Elevated d-dimer 01/16/2020    Fatigue 06/18/2018    H/O echocardiogram 01/27/2020    EF 51-00, Grade I diastolic dysfunction.     History of exercise stress test 01/27/2020    Treadmill, normal stress test, THR achieved    History of nuclear stress test 08/23/2021    EF 55%, ETT negative for ischemia/arrhythmia, Medium sized area of moderate inferior wall ischemia    Hyperlipidemia     Hypertension     Obesity 02/15/2000    Psoriasis 03/09/2004     Past Surgical History:        Procedure Laterality Date    CHOLECYSTECTOMY  1999    COLONOSCOPY  10/22/2003    polyp x1    COLONOSCOPY  2010    Normal exam    COLONOSCOPY  12/05/2017    single 2mm polyp found in proximal ascending colon, diverticulosis found in sigmoid colon    CYST INCISION AND DRAINAGE Right 2013    FRACTURE SURGERY Right 1969    tibia    FRACTURE SURGERY Left 1994    shoulder    FRACTURE SURGERY 2011    Nose & facial bones    HERNIA REPAIR  [id number]    Umbilical     Current Medications:   Current Facility-Administered Medications: 0.9 % sodium chloride infusion, , IntraVENous, Continuous  0.9 % sodium chloride bolus, 500 mL, IntraVENous, Once  sodium chloride flush 0.9 % injection 5-40 mL, 5-40 mL, IntraVENous, 2 times per day  sodium chloride flush 0.9 % injection 5-40 mL, 5-40 mL, IntraVENous, PRN  0.9 % sodium chloride infusion, 25 mL, IntraVENous, PRN  acetaminophen (TYLENOL) tablet 650 mg, 650 mg, Oral, Q4H PRN  hydrALAZINE (APRESOLINE) injection 10 mg, 10 mg, IntraVENous, Q10 Min PRN  Allergies:  Clarithromycin, Levaquin [levofloxacin], and Bicillin [penicillin g benzathine]    Social History:   Social History     Socioeconomic History    Marital status:      Spouse name: Not on file    Number of children: Not on file    Years of education: Not on file    Highest education level: Not on file   Occupational History    Not on file   Tobacco Use    Smoking status: Current Some Day Smoker     Types: Cigars    Smokeless tobacco: Never Used    Tobacco comment: also former cigarette smoker. 8/30/2021   Substance and Sexual Activity    Alcohol use: Yes     Comment: occasionally    Drug use: No    Sexual activity: Not on file   Other Topics Concern    Not on file   Social History Narrative    Not on file     Social Determinants of Health     Financial Resource Strain:     Difficulty of Paying Living Expenses:    Food Insecurity:     Worried About Running Out of Food in the Last Year:     [address] in the Last Year:    Transportation Needs:     Lack of Transportation (Medical):      Lack of Transportation (Non-Medical):    Physical Activity:     Days of Exercise per Week:     Minutes of Exercise per Session:    Stress:     Feeling of Stress :    Social Connections:     Frequency of Communication with Friends and Family:     Frequency of Social Gatherings with Friends and Family:  Attends [employer] Services:     Active Member of Clubs or Organizations:     Attends Club or Organization Meetings:     Marital Status:    Intimate Partner Violence:     Fear of Current or Ex-Partner:     Emotionally Abused:     Physically Abused:     Sexually Abused:      Family History:    Family History   Problem Relation Age of Onset    Stroke Mother     Diabetes Father     Diabetes Brother     Diabetes Paternal Grandmother     Diabetes Brother     Diabetes Brother        REVIEW OF SYSTEMS:    CONSTITUTIONAL:  Neg. EYES:  Neg. EARS:  Neg     NOSE:  Neg.    MOUTH/THROAT:  Neg.    RESPIRATORY:   Neg. CARDIOVASCULAR   Neg. GASTROINTESTINAL:  Neg. GENITOURINARY:  Neg.    HEMATOLOGIC/LYMPHATIC:  Neg.    MUSCULOSKELETAL:  Neg. NEUROLOGICAL:  Neg. SKIN :  Neg. PSYCHIATRIC:  Neg   ENDOCRINE:  Neg. ALL/IMM :  Neg. PHYSICAL EXAM:  VITALS:  BP (!) 153/74   Pulse 70   Temp 96.2 °F (35.7 °C) (Temporal)   Resp 15   Ht 5' 8\" (1.727 m)   Wt 180 lb (81.6 kg)   SpO2 100%   BMI 27.37 kg/m²   CONSTITUTIONAL:  awake, alert, cooperative, no apparent distress, and appears stated age  NECK:  Supple, symmetrical, trachea midline, no adenopathy, thyroid symmetric, not enlarged and no tenderness, skin normal  HEMATOLOGIC/LYMPHATICS:  no cervical lymphadenopathy and no supraclavicular lymphadenopathy  LUNGS:  No increased work of breathing, good air exchange, clear to auscultation bilaterally, no crackles or wheezing  CARDIOVASCULAR:  Normal apical impulse, regular rate and rhythm, normal S1 and S2, no S3 or S4, and no murmur noted  CHEST/BREASTS:  symmetric  ABDOMEN: soft nt nd, no pulsitile masses  MUSCULOSKELETAL:  There is no redness, warmth, or swelling of the joints. Full range of motion noted. Motor strength is 5 out of 5 all extremities bilaterally. Tone is normal.  NEUROLOGIC:  Awake, alert, oriented to name, place and time. Cranial nerves II-XII are grossly intact.   Motor is 5 out of 5 bilaterally. SKIN:  no bruising or bleeding and normal skin color, texture, turgor  VASC: 1+ femoral pulses        DATA:    CT chest reviewed in detail. He has no ascending aortic calcification but there is a small lesion in the right upper lobe periphery that is infiltrative in nature. The official read is still pending    Cardiac catheterization shows LAD with proximal 70 to 80% lesion, mid LAD with 90% lesion, diffuse dye disease, OM with 70 to 80% stenosis in the circumflex has 90% stenosis. RCA has a proximal 80 to 90% stenosis in the PDA with a 90% stenosis. Echocardiogram reviewed. Ejection fraction is 50 to 55% with no regional wall abnormalities or valvular abnormalities. IMPRESSION  Active Hospital Problems    Diagnosis Date Noted    Chest pain [R07.9] 08/23/2021    Syncope [R55] 08/23/2021    Diabetes mellitus ([facility] [address] [E11.9]     Hyperlipidemia [E78.5]     Hypertension [I10]        Anginal symptoms with multivessel coronary disease      RECOMMENDATIONS:    Plan will be CABG. The risks, benefits, and alternatives of coronary artery bypass grafting were discussed in detail with the patient. The risks include infection, bleeding, need for reexploration, death, stroke, pulmonary complications, liver complications, or renal complications. The patient understands and agrees to proceed. We will plan for most likely Friday. He will need repeat CT scan to follow this peripheral infiltrative area in the lung. We will obtain an echocardiogram along with preoperative testing for surgery.     Electronically signed by Gilberto Mata MD on 9/8/2021 at 3:40 PM

## 2021-09-09 ENCOUNTER — HOSPITAL ENCOUNTER (OUTPATIENT)
Dept: LAB | Age: 76
Setting detail: SURGERY ADMIT
Discharge: HOME | DRG: 234 | End: 2021-09-09
Attending: THORACIC SURGERY (CARDIOTHORACIC VASCULAR SURGERY)
Payer: MEDICARE

## 2021-09-09 ENCOUNTER — HOSPITAL ENCOUNTER (OUTPATIENT)
Dept: PULMONOLOGY | Age: 76
Discharge: HOME | DRG: 234 | End: 2021-09-09
Payer: MEDICARE

## 2021-09-09 ENCOUNTER — HOSPITAL ENCOUNTER (OUTPATIENT)
Dept: ULTRASOUND IMAGING | Age: 76
Discharge: HOME | DRG: 234 | End: 2021-09-09
Payer: MEDICARE

## 2021-09-09 ENCOUNTER — HOSPITAL ENCOUNTER (OUTPATIENT)
Age: 76
Discharge: HOME | DRG: 234 | End: 2021-09-09
Payer: MEDICARE

## 2021-09-09 ENCOUNTER — ANESTHESIA EVENT (OUTPATIENT)
Dept: OPERATING ROOM | Age: 76
DRG: 234 | End: 2021-09-09
Payer: MEDICARE

## 2021-09-09 ENCOUNTER — HOSPITAL ENCOUNTER (OUTPATIENT)
Dept: PREADMISSION TESTING | Age: 76
Setting detail: SURGERY ADMIT
Discharge: HOME | DRG: 234 | End: 2021-09-13
Payer: MEDICARE

## 2021-09-09 VITALS
WEIGHT: 183.4 LBS | BODY MASS INDEX: 27.8 KG/M2 | OXYGEN SATURATION: 100 % | HEIGHT: 68 IN | RESPIRATION RATE: 18 BRPM | HEART RATE: 62 BPM | DIASTOLIC BLOOD PRESSURE: 70 MMHG | SYSTOLIC BLOOD PRESSURE: 138 MMHG

## 2021-09-09 LAB
ANION GAP SERPL CALCULATED.3IONS-SCNC: 11 MMOL/L (ref 4–16)
APTT: 25.8 SECONDS (ref 25.1–37.1)
BACTERIA: NEGATIVE /HPF
BASOPHILS ABSOLUTE: 0 K/CU MM
BASOPHILS RELATIVE PERCENT: 0.3 % (ref 0–1)
BILIRUBIN URINE: NEGATIVE MG/DL
BLOOD, URINE: NEGATIVE
BUN BLDV-MCNC: 15 MG/DL (ref 6–23)
CALCIUM SERPL-MCNC: 9.1 MG/DL (ref 8.3–10.6)
CHLORIDE BLD-SCNC: 101 MMOL/L (ref 99–110)
CLARITY: CLEAR
CO2: 27 MMOL/L (ref 21–32)
COLOR: YELLOW
CREAT SERPL-MCNC: 0.8 MG/DL (ref 0.9–1.3)
DIFFERENTIAL TYPE: ABNORMAL
EKG ATRIAL RATE: 65 BPM
EKG DIAGNOSIS: NORMAL
EKG P AXIS: 53 DEGREES
EKG P-R INTERVAL: 194 MS
EKG Q-T INTERVAL: 376 MS
EKG QRS DURATION: 102 MS
EKG QTC CALCULATION (BAZETT): 391 MS
EKG R AXIS: -18 DEGREES
EKG T AXIS: 45 DEGREES
EKG VENTRICULAR RATE: 65 BPM
EOSINOPHILS ABSOLUTE: 0.2 K/CU MM
EOSINOPHILS RELATIVE PERCENT: 3.1 % (ref 0–3)
GFR AFRICAN AMERICAN: >60 ML/MIN/1.73M2
GFR NON-AFRICAN AMERICAN: >60 ML/MIN/1.73M2
GLUCOSE BLD-MCNC: 242 MG/DL (ref 70–99)
GLUCOSE, URINE: >500 MG/DL
HCT VFR BLD CALC: 36.2 % (ref 42–52)
HEMOGLOBIN: 11.8 GM/DL (ref 13.5–18)
IMMATURE NEUTROPHIL %: 0.3 % (ref 0–0.43)
INR BLD: 0.97 INDEX
KETONES, URINE: NEGATIVE MG/DL
LEUKOCYTE ESTERASE, URINE: NEGATIVE
LYMPHOCYTES ABSOLUTE: 1.5 K/CU MM
LYMPHOCYTES RELATIVE PERCENT: 25.4 % (ref 24–44)
MAGNESIUM: 1.8 MG/DL (ref 1.8–2.4)
MCH RBC QN AUTO: 30.3 PG (ref 27–31)
MCHC RBC AUTO-ENTMCNC: 32.6 % (ref 32–36)
MCV RBC AUTO: 93.1 FL (ref 78–100)
MONOCYTES ABSOLUTE: 0.5 K/CU MM
MONOCYTES RELATIVE PERCENT: 8.2 % (ref 0–4)
MUCUS: ABNORMAL HPF
NITRITE URINE, QUANTITATIVE: NEGATIVE
NUCLEATED RBC %: 0 %
PDW BLD-RTO: 13 % (ref 11.7–14.9)
PH, URINE: 5 (ref 5–8)
PLATELET # BLD: 127 K/CU MM (ref 140–440)
PMV BLD AUTO: 11.4 FL (ref 7.5–11.1)
POTASSIUM SERPL-SCNC: 4.2 MMOL/L (ref 3.5–5.1)
PROTEIN UA: NEGATIVE MG/DL
PROTHROMBIN TIME: 12.5 SECONDS (ref 11.7–14.5)
RBC # BLD: 3.89 M/CU MM (ref 4.6–6.2)
RBC URINE: <1 /HPF (ref 0–3)
SARS-COV-2, NAAT: NOT DETECTED
SEGMENTED NEUTROPHILS ABSOLUTE COUNT: 3.8 K/CU MM
SEGMENTED NEUTROPHILS RELATIVE PERCENT: 62.7 % (ref 36–66)
SODIUM BLD-SCNC: 139 MMOL/L (ref 135–145)
SOURCE: NORMAL
SPECIFIC GRAVITY UA: 1.02 (ref 1–1.03)
TOTAL IMMATURE NEUTOROPHIL: 0.02 K/CU MM
TOTAL NUCLEATED RBC: 0 K/CU MM
TRICHOMONAS: ABNORMAL /HPF
UROBILINOGEN, URINE: NEGATIVE MG/DL (ref 0.2–1)
WBC # BLD: 6.1 K/CU MM (ref 4–10.5)
WBC UA: 2 /HPF (ref 0–2)

## 2021-09-09 PROCEDURE — 80048 BASIC METABOLIC PNL TOTAL CA: CPT

## 2021-09-09 PROCEDURE — 85610 PROTHROMBIN TIME: CPT

## 2021-09-09 PROCEDURE — 93971 EXTREMITY STUDY: CPT

## 2021-09-09 PROCEDURE — 93880 EXTRACRANIAL BILAT STUDY: CPT

## 2021-09-09 PROCEDURE — 86900 BLOOD TYPING SEROLOGIC ABO: CPT

## 2021-09-09 PROCEDURE — 94010 BREATHING CAPACITY TEST: CPT

## 2021-09-09 PROCEDURE — 93005 ELECTROCARDIOGRAM TRACING: CPT | Performed by: THORACIC SURGERY (CARDIOTHORACIC VASCULAR SURGERY)

## 2021-09-09 PROCEDURE — 86922 COMPATIBILITY TEST ANTIGLOB: CPT

## 2021-09-09 PROCEDURE — 86850 RBC ANTIBODY SCREEN: CPT

## 2021-09-09 PROCEDURE — 85025 COMPLETE CBC W/AUTO DIFF WBC: CPT

## 2021-09-09 PROCEDURE — 81001 URINALYSIS AUTO W/SCOPE: CPT

## 2021-09-09 PROCEDURE — 94150 VITAL CAPACITY TEST: CPT

## 2021-09-09 PROCEDURE — 36415 COLL VENOUS BLD VENIPUNCTURE: CPT

## 2021-09-09 PROCEDURE — 83735 ASSAY OF MAGNESIUM: CPT

## 2021-09-09 PROCEDURE — 87081 CULTURE SCREEN ONLY: CPT

## 2021-09-09 PROCEDURE — 87635 SARS-COV-2 COVID-19 AMP PRB: CPT

## 2021-09-09 PROCEDURE — 86901 BLOOD TYPING SEROLOGIC RH(D): CPT

## 2021-09-09 PROCEDURE — 85730 THROMBOPLASTIN TIME PARTIAL: CPT

## 2021-09-09 PROCEDURE — 93010 ELECTROCARDIOGRAM REPORT: CPT | Performed by: INTERNAL MEDICINE

## 2021-09-09 RX ORDER — NOREPINEPHRINE BIT/0.9 % NACL 16MG/250ML
2-100 INFUSION BOTTLE (ML) INTRAVENOUS
Status: DISCONTINUED | OUTPATIENT
Start: 2021-09-10 | End: 2021-09-09

## 2021-09-09 NOTE — PROGRESS NOTES
Surgery is on  9/10/21 at 0700 with arrival time of 0530. Enter through the ER and check in at the desk. 1. Do not eat or drink anything after midnight - unless instructed by your doctor prior to surgery. This includes                   no water, chewing gum or mints. 2. Follow your directions as prescribed by the doctor for your procedure and medications. 3. Check with your Doctor regarding stopping Plavix, Coumadin, Lovenox,Effient,Pradaxa,Xarelto, Fragmin or other blood thinners and                   follow their instructions. Do not take anymore aspirin or naproxen and lisinopril. after today 9/9/21 if you haven't already been told to stop. Morning of surgery you make take your and metoprolol with a tiny drink of water and use your eye drops. 4. Do not smoke, and do not drink any alcoholic beverages 24 hours prior to surgery. This includes NA Beer. 5. You may brush your teeth and gargle the morning of surgery. DO NOT SWALLOW WATER   6. You MUST make arrangements for a responsible adult to take you home after your surgery and be able to check on you every couple                   hours for the day. You will not be allowed to leave alone or drive yourself home. It is strongly suggested someone stay with you the first 24                   hrs. Your surgery will be cancelled if you do not have a ride home. 7. Please wear simple, loose fitting clothing to the hospital.  Sophia Miller not bring valuables (money, credit cards, checkbooks, etc.) Do not wear any                   makeup (including no eye makeup) or nail polish on your fingers or toes. 8. DO NOT wear any jewelry or piercings on day of surgery. All body piercing jewelry must be removed. 9. If you have dentures, they will be removed before going to the OR; we will provide you a container. If you wear contact lenses or glasses,                  they will be removed; please bring a case for them.            10. If you  have a Living Will and Durable Power of  for Healthcare, please bring in a copy. 11. Please bring picture ID,  insurance card, paperwork from the doctors office    (H & P, Consent, & card for implantable devices). 12. Take a shower the night before or morning of your procedure, do not apply any lotion, oil or powder. 13. Wear a mask covering your nose & mouth when entering the hospital. Have your covid-19 test performed within 2-7 days of your                  surgery. Quarantine yourself after the test until after your surgery. Covid test to be completed 9/9/21 at PAT visit.

## 2021-09-09 NOTE — ANESTHESIA PRE PROCEDURE
Department of Anesthesiology  Preprocedure Note       Name:  Ignacio Fajardo   Age:  78 y.o.  :  1945                                          MRN:  [id number]         Date:  2021      Surgeon: Sondra Liu):  Rosy Cantu MD    Procedure: Procedure(s):  CABG CORONARY ARTERY BYPASS    Medications prior to admission:   Prior to Admission medications    Medication Sig Start Date End Date Taking?  Authorizing Provider   latanoprost (XALATAN) 0.005 % ophthalmic solution Apply 1 drop to eye daily 7/15/21   Historical Provider, MD   nitroGLYCERIN (NITROSTAT) 0.4 MG SL tablet as needed 21   Historical Provider, MD   pravastatin (PRAVACHOL) 40 MG tablet 1 tablet daily 21   Historical Provider, MD   tamsulosin (FLOMAX) 0.4 MG capsule Take 0.8 mg by mouth daily 21   Historical Provider, MD   JANUVIA 100 MG tablet 1 tablet daily 21   Historical Provider, MD   Coenzyme Q10 (CO Q 10 PO) Take by mouth daily    Historical Provider, MD   Multiple Vitamins-Minerals (OCUVITE ADULT 50+ PO) Take by mouth daily    Historical Provider, MD   metoprolol tartrate (LOPRESSOR) 25 MG tablet Take 1 tablet by mouth 2 times daily 21   Memo Chatman MD   isosorbide mononitrate (IMDUR) 30 MG extended release tablet Take 1 tablet by mouth daily 21   Memo Chatman MD   glimepiride (AMARYL) 4 MG tablet Take 4 mg by mouth every morning (before breakfast)    Historical Provider, MD   lisinopril (PRINIVIL;ZESTRIL) 5 MG tablet Take 5 mg by mouth daily     Historical Provider, MD   minocycline (MINOCIN;DYNACIN) 100 MG capsule Take 100 mg by mouth daily    Historical Provider, MD   metFORMIN (GLUCOPHAGE-XR) 500 MG extended release tablet Take 2,000 mg by mouth Daily with supper    Historical Provider, MD   aspirin 81 MG tablet Take 81 mg by mouth daily    Historical Provider, MD   Multiple Vitamins-Minerals (CENTRUM SILVER 50+MEN PO) Take by mouth    Historical Provider, MD   naproxen (NAPROSYN) 500 MG tablet Take 500 mg by mouth 2 times daily as needed for Pain    Historical Provider, MD       Current medications:    No current facility-administered medications for this encounter. Current Outpatient Medications   Medication Sig Dispense Refill    latanoprost (XALATAN) 0.005 % ophthalmic solution Apply 1 drop to eye daily      nitroGLYCERIN (NITROSTAT) 0.4 MG SL tablet as needed      pravastatin (PRAVACHOL) 40 MG tablet 1 tablet daily      tamsulosin (FLOMAX) 0.4 MG capsule Take 0.8 mg by mouth daily      JANUVIA 100 MG tablet 1 tablet daily      Coenzyme Q10 (CO Q 10 PO) Take by mouth daily      Multiple Vitamins-Minerals (OCUVITE ADULT 50+ PO) Take by mouth daily      metoprolol tartrate (LOPRESSOR) 25 MG tablet Take 1 tablet by mouth 2 times daily 60 tablet 0    isosorbide mononitrate (IMDUR) 30 MG extended release tablet Take 1 tablet by mouth daily 30 tablet 3    glimepiride (AMARYL) 4 MG tablet Take 4 mg by mouth every morning (before breakfast)      lisinopril (PRINIVIL;ZESTRIL) 5 MG tablet Take 5 mg by mouth daily       minocycline (MINOCIN;DYNACIN) 100 MG capsule Take 100 mg by mouth daily      metFORMIN (GLUCOPHAGE-XR) 500 MG extended release tablet Take 2,000 mg by mouth Daily with supper      aspirin 81 MG tablet Take 81 mg by mouth daily      Multiple Vitamins-Minerals (CENTRUM SILVER 50+MEN PO) Take by mouth      naproxen (NAPROSYN) 500 MG tablet Take 500 mg by mouth 2 times daily as needed for Pain         Allergies:     Allergies   Allergen Reactions    Clarithromycin     Levaquin [Levofloxacin] Hives    Bicillin [Penicillin G Benzathine] Rash       Problem List:    Patient Active Problem List   Diagnosis Code    Chest pain R07.9    Syncope R55    Diabetes mellitus ([facility] [address] E11.9    Hyperlipidemia E78.5    Hypertension I10       Past Medical History:        Diagnosis Date    Abnormal Heart Score CT 03/04/2005    Diabetes mellitus ([facility] [address]     Elevated d-dimer 01/16/2020    Fatigue 06/18/2018    H/O echocardiogram 01/27/2020    EF 20-36, Grade I diastolic dysfunction.  History of exercise stress test 01/27/2020    Treadmill, normal stress test, THR achieved    History of nuclear stress test 08/23/2021    EF 55%, ETT negative for ischemia/arrhythmia, Medium sized area of moderate inferior wall ischemia    Hyperlipidemia     Hypertension     Obesity 02/15/2000    Psoriasis 03/09/2004       Past Surgical History:        Procedure Laterality Date    CHOLECYSTECTOMY  1999    COLONOSCOPY  10/22/2003    polyp x1    COLONOSCOPY  2010    Normal exam    COLONOSCOPY  12/05/2017    single 2mm polyp found in proximal ascending colon, diverticulosis found in sigmoid colon    CYST INCISION AND DRAINAGE Right 2013    FRACTURE SURGERY Right 1969    tibia    FRACTURE SURGERY Left 1994    shoulder    FRACTURE SURGERY  2011    Nose & facial bones    HERNIA REPAIR  0831    Umbilical       Social History:    Social History     Tobacco Use    Smoking status: Current Some Day Smoker     Types: Cigars    Smokeless tobacco: Never Used    Tobacco comment: also former cigarette smoker. 8/30/2021   Substance Use Topics    Alcohol use: Yes     Comment: occasionally                                Ready to quit: Not Answered  Counseling given: Not Answered  Comment: also former cigarette smoker. 8/30/2021      Vital Signs (Current): There were no vitals filed for this visit.                                            BP Readings from Last 3 Encounters:   09/08/21 (!) 153/74   09/02/21 128/76   08/30/21 (!) 148/78       NPO Status:                                                                                 BMI:   Wt Readings from Last 3 Encounters:   09/08/21 180 lb (81.6 kg)   08/30/21 178 lb 6.4 oz (80.9 kg)   01/27/20 196 lb (88.9 kg)     There is no height or weight on file to calculate BMI.    CBC:   Lab Results   Component Value Date    WBC 5.8 09/02/2021    RBC 4.02 09/02/2021    HGB 12.2 09/02/2021    HCT 37.8 09/02/2021    MCV 94.0 09/02/2021    RDW 13.0 09/02/2021     09/02/2021       CMP:   Lab Results   Component Value Date     09/02/2021    K 5.3 09/02/2021     09/02/2021    CO2 27 09/02/2021    BUN 19 09/02/2021    CREATININE 1.3 09/02/2021    GFRAA >60 09/02/2021    LABGLOM 54 09/02/2021    GLUCOSE 230 09/02/2021    PROT 7.3 10/10/2011    CALCIUM 9.5 09/02/2021    BILITOT 0.6 07/30/2021    ALKPHOS 204 07/30/2021    AST 51 07/30/2021    ALT 55 07/30/2021       POC Tests: No results for input(s): POCGLU, POCNA, POCK, POCCL, POCBUN, POCHEMO, POCHCT in the last 72 hours. Coags: No results found for: PROTIME, INR, APTT    HCG (If Applicable): No results found for: PREGTESTUR, PREGSERUM, HCG, HCGQUANT     ABGs:   Lab Results   Component Value Date    PO2ART 68 09/08/2021    JEY0CUE 44.0 09/08/2021    VAT4UPC 26.0 09/08/2021        Type & Screen (If Applicable):  No results found for: LABABO, LABRH    Drug/Infectious Status (If Applicable):  No results found for: HIV, HEPCAB    COVID-19 Screening (If Applicable):   Lab Results   Component Value Date    COVID19 NOT DETECTED 09/02/2021           Anesthesia Evaluation  Patient summary reviewed  Airway: Mallampati: II  TM distance: >3 FB   Neck ROM: full  Mouth opening: > = 3 FB Dental:          Pulmonary:normal exam                               Cardiovascular:    (+) hypertension:, CAD:,                ROS comment: Echo   Summary   Left ventricular systolic function is normal.   Ejection fraction is visually estimated at 50-55%. Mild left ventricular hypertrophy. Indeterminate diastolic function; E/A flow reversal is noted. Sclerotic, but non-stenotic aortic valve. No evidence of any pericardial effusion. [facility]  Procedure Summary   Access : radial Indication : abnormal stress test   1. LAD has proximal has 70-80 % lesion , mid LAD has 90 % lesion   , diffuse Diag disease   2.  OM has 70-80 % lesion , Circ has 90 % lesion   3. RCA has proximal 80-90 % lesion , PDA has 90 % lesion           Neuro/Psych:               GI/Hepatic/Renal:   (+) morbid obesity          Endo/Other:    (+) DiabetesType II DM, , .                 Abdominal:             Vascular: Other Findings:           Anesthesia Plan      general     ASA 4     ( Pre Anesthesia Assessment complete. Chart reviewed on 9/9/2021  covid pending)  Induction: intravenous. arterial line, BIS, central line, CVP and TERI  MIPS: Postoperative opioids intended and Postoperative ventilation. Anesthetic plan and risks discussed with patient. Plan discussed with CRNA.     Attending anesthesiologist reviewed and agrees with RADHA Alcocer - CRNA   9/9/2021

## 2021-09-09 NOTE — PROGRESS NOTES
Chart reviewed by cardiac rehab nurse. Met patient and spouse in lobby. Introduced myself and teaching plan. Patient's planned procedure is CABG. Teaching done on A&P of the heart and formation of CAD. Explained the surgical process, Pre-Op,Inter-Op and Post-Op. Discussed length of stay and recovery. Explanation given of pre op routine tests, lines/tubes/equipment, and infection control. Educated on weaning from ventilator, medication and equipment to expect, on progressive activity, post op pain, and how it will be managed. Encouraged pt to communicate about pain in order to create a partnership for his recovery success. Discussed importance of coughing and deep breathing and sternal precautions. Introduced multidisciplinary approach and daily routine in CVICU. Pt verbalized commitment to recovery program.        Teaching done on post discharge recovery, activity guidelines, and weight monitoring. Discussed follow up appointments, possibility of ECF, role of home health, and family involvement. Pt lives with his spouse who will be assisting with his care when he returns home. Introduced benefits of out patient cardiac rehab after appropriate time frame. Went over visitation policy with patient. Given Understanding Heart Surgery book. Escorted to main exit with all personal belongings as well as items given to patient from facility for upcoming surgery.      Dr. Ashley Wayne notified of risk scores and abnormal labs

## 2021-09-09 NOTE — PROGRESS NOTES
Cardiac Surgery Risk Factor Worksheet      STS Criteria  Possible Score Yes/No Score ICD 10 Notes   Emergency Case 6 No   MG 1.8   Serum Creatinine      BUN 15  CR 0.8   >1.2 0 No      >1.6 to <1.8mg/dl 1 No      >.1.9 4 No      Acute/Chronic Renal Failure/Renal Insufficiency 0   No  GFR  Stage of CKD GFR >60   Left Ventricular Dysfunction(EF<40%) 3   No   EF 50-55%   Operative Mitral Valve Insufficiency 3   No      Reoperation 3 No      Age >71 and <74 years 1  Yes   1  78 YOA   Age >75 years 2 No      Prior Vascular Surgery 2   No      COPD +History of Patient Use of Bronchodilators 2   No  Acuity of  COPD ABG'S:   PH 7.38  PCO2 44  PO2 68  BE 0.5  HCO3 26  CO2 27.4  O2 92.6   COPD 0 No   PFT STUDY: NORMAL SPIROMETRY  FEV1 PRED 102   Current Smoker of History of Smoking  0   Yes 0  CURRENT QD CIGAR SMOKER   Anemia (Hemotocrit<0.34) 2   No   RBC 3.89  HGB 11.8  HCT 36.2     ASA / Anticoagulants/ Bleeding Tendiencies / Antiplatelets 0   Yes 0  ASA  PT 12.5  INR 0.97  PTT 25.8   Operative Aortic Valve Stenosis 1 No        Weight<143 lbs (  BMI<18.5) 1   No  Obesity with  BMI    Weight >200 lbs (BMI >30    0   No   HT 5'8\"/172.7CM  . 6LBS/83.2 KG  BMI 27.89   Diabetes Oral or Insulin Therapy 1   Yes 1 Type & Control A1C 7.5  GLUCOSE 242  TAKES: AMARYL, JANUVIA, METFORMIN    Cerebrovascular Disease 1   No   NO HX OF CVA/TIA  CAROTID US: 0-50% SKINNY IC   Impaired Mobility 0 No      Walker/Cane/Wheelchair 0 No      Recent MI 0 No      Hypertension 0 Yes 0     Peripheral Vascular Disease 0 No      Lives Alone 0 No   GARDENIA (SPOUSE) [phone]   Other (GI Auto Immune Hepatic etc) 0 No  Malnutrition &   Acuity    CHF & Type & Acuity    TOTAL   2     Note The surgeon must be notified for all risk scores >7    Notified by Kervin Murphy RN   9/9/2021

## 2021-09-10 ENCOUNTER — APPOINTMENT (OUTPATIENT)
Dept: GENERAL RADIOLOGY | Age: 76
DRG: 234 | End: 2021-09-10
Attending: THORACIC SURGERY (CARDIOTHORACIC VASCULAR SURGERY)
Payer: MEDICARE

## 2021-09-10 ENCOUNTER — ANESTHESIA (OUTPATIENT)
Dept: OPERATING ROOM | Age: 76
DRG: 234 | End: 2021-09-10
Payer: MEDICARE

## 2021-09-10 ENCOUNTER — HOSPITAL ENCOUNTER (INPATIENT)
Age: 76
LOS: 4 days | Discharge: HOME | DRG: 234 | End: 2021-09-14
Attending: THORACIC SURGERY (CARDIOTHORACIC VASCULAR SURGERY) | Admitting: THORACIC SURGERY (CARDIOTHORACIC VASCULAR SURGERY)
Payer: MEDICARE

## 2021-09-10 VITALS
TEMPERATURE: 97 F | DIASTOLIC BLOOD PRESSURE: 89 MMHG | OXYGEN SATURATION: 100 % | RESPIRATION RATE: 12 BRPM | SYSTOLIC BLOOD PRESSURE: 165 MMHG

## 2021-09-10 DIAGNOSIS — I25.10 CAD IN NATIVE ARTERY: Primary | ICD-10-CM

## 2021-09-10 LAB
ANION GAP SERPL CALCULATED.3IONS-SCNC: 9 MMOL/L (ref 4–16)
APTT: 27 SECONDS (ref 25.1–37.1)
BASE EXCESS MIXED: 0.3 (ref 0–1.2)
BASE EXCESS MIXED: 0.5 (ref 0–1.2)
BASE EXCESS MIXED: 1.8 (ref 0–1.2)
BASE EXCESS MIXED: 2.5 (ref 0–1.2)
BASE EXCESS MIXED: 2.5 (ref 0–1.2)
BASE EXCESS MIXED: 2.7 (ref 0–1.2)
BASE EXCESS MIXED: 2.7 (ref 0–1.2)
BASE EXCESS MIXED: 2.8 (ref 0–1.2)
BASE EXCESS MIXED: 2.9 (ref 0–1.2)
BASE EXCESS MIXED: 3.1 (ref 0–1.2)
BASE EXCESS MIXED: 3.2 (ref 0–1.2)
BASE EXCESS MIXED: 3.4 (ref 0–1.2)
BASE EXCESS MIXED: 3.5 (ref 0–1.2)
BASE EXCESS MIXED: 4.6 (ref 0–1.2)
BASE EXCESS: ABNORMAL (ref 0–3.3)
BUN BLDV-MCNC: 11 MG/DL (ref 6–23)
CALCIUM SERPL-MCNC: 8.4 MG/DL (ref 8.3–10.6)
CHLORIDE BLD-SCNC: 111 MMOL/L (ref 99–110)
CO2: 22 MMOL/L (ref 21–32)
CO2: 23 MMOL/L (ref 21–32)
CO2: 24 MMOL/L (ref 21–32)
CO2: 24 MMOL/L (ref 21–32)
CO2: 25 MMOL/L (ref 21–32)
CO2: 26 MMOL/L (ref 21–32)
CO2: 28 MMOL/L (ref 21–32)
CREAT SERPL-MCNC: 0.8 MG/DL (ref 0.9–1.3)
GFR AFRICAN AMERICAN: >60 ML/MIN/1.73M2
GFR NON-AFRICAN AMERICAN: >60 ML/MIN/1.73M2
GLUCOSE BLD-MCNC: 100 MG/DL (ref 70–99)
GLUCOSE BLD-MCNC: 104 MG/DL (ref 70–99)
GLUCOSE BLD-MCNC: 107 MG/DL (ref 70–99)
GLUCOSE BLD-MCNC: 110 MG/DL (ref 70–99)
GLUCOSE BLD-MCNC: 113 MG/DL (ref 70–99)
GLUCOSE BLD-MCNC: 120 MG/DL (ref 70–99)
GLUCOSE BLD-MCNC: 128 MG/DL (ref 70–99)
GLUCOSE BLD-MCNC: 128 MG/DL (ref 70–99)
GLUCOSE BLD-MCNC: 132 MG/DL (ref 70–99)
GLUCOSE BLD-MCNC: 144 MG/DL (ref 70–99)
GLUCOSE BLD-MCNC: 145 MG/DL (ref 70–99)
GLUCOSE BLD-MCNC: 155 MG/DL (ref 70–99)
GLUCOSE BLD-MCNC: 160 MG/DL (ref 70–99)
GLUCOSE BLD-MCNC: 162 MG/DL (ref 70–99)
GLUCOSE BLD-MCNC: 165 MG/DL (ref 70–99)
GLUCOSE BLD-MCNC: 169 MG/DL (ref 70–99)
GLUCOSE BLD-MCNC: 174 MG/DL (ref 70–99)
GLUCOSE BLD-MCNC: 191 MG/DL (ref 70–99)
GLUCOSE BLD-MCNC: 99 MG/DL (ref 70–99)
HCO3 ARTERIAL: 20.7 MMOL/L (ref 18–23)
HCO3 ARTERIAL: 20.8 MMOL/L (ref 18–23)
HCO3 ARTERIAL: 21.3 MMOL/L (ref 18–23)
HCO3 ARTERIAL: 21.3 MMOL/L (ref 18–23)
HCO3 ARTERIAL: 21.6 MMOL/L (ref 18–23)
HCO3 ARTERIAL: 21.8 MMOL/L (ref 18–23)
HCO3 ARTERIAL: 22.1 MMOL/L (ref 18–23)
HCO3 ARTERIAL: 22.3 MMOL/L (ref 18–23)
HCO3 ARTERIAL: 22.9 MMOL/L (ref 18–23)
HCO3 ARTERIAL: 23.1 MMOL/L (ref 18–23)
HCO3 ARTERIAL: 23.5 MMOL/L (ref 18–23)
HCO3 ARTERIAL: 23.9 MMOL/L (ref 18–23)
HCO3 ARTERIAL: 24.7 MMOL/L (ref 18–23)
HCO3 ARTERIAL: 26.2 MMOL/L (ref 18–23)
HCT VFR BLD CALC: 23 % (ref 42–52)
HCT VFR BLD CALC: 26 % (ref 42–52)
HCT VFR BLD CALC: 26 % (ref 42–52)
HCT VFR BLD CALC: 27 % (ref 42–52)
HCT VFR BLD CALC: 28 % (ref 42–52)
HCT VFR BLD CALC: 29 % (ref 42–52)
HCT VFR BLD CALC: 30 % (ref 42–52)
HCT VFR BLD CALC: 31.7 % (ref 42–52)
HEMOGLOBIN: 10 GM/DL (ref 13.5–18)
HEMOGLOBIN: 10.3 GM/DL (ref 13.5–18)
HEMOGLOBIN: 10.6 GM/DL (ref 13.5–18)
HEMOGLOBIN: 7.9 GM/DL (ref 13.5–18)
HEMOGLOBIN: 8.8 GM/DL (ref 13.5–18)
HEMOGLOBIN: 8.9 GM/DL (ref 13.5–18)
HEMOGLOBIN: 9.2 GM/DL (ref 13.5–18)
HEMOGLOBIN: 9.3 GM/DL (ref 13.5–18)
HEMOGLOBIN: 9.3 GM/DL (ref 13.5–18)
HEMOGLOBIN: 9.5 GM/DL (ref 13.5–18)
HEMOGLOBIN: 9.6 GM/DL (ref 13.5–18)
INR BLD: 1.12 INDEX
MAGNESIUM: 2.3 MG/DL (ref 1.8–2.4)
MCH RBC QN AUTO: 30.4 PG (ref 27–31)
MCHC RBC AUTO-ENTMCNC: 33.4 % (ref 32–36)
MCV RBC AUTO: 90.8 FL (ref 78–100)
O2 SATURATION: 100 % (ref 96–97)
O2 SATURATION: 92.8 % (ref 96–97)
O2 SATURATION: 94.3 % (ref 96–97)
O2 SATURATION: 95.8 % (ref 96–97)
O2 SATURATION: 96.3 % (ref 96–97)
O2 SATURATION: 97.2 % (ref 96–97)
O2 SATURATION: 97.3 % (ref 96–97)
O2 SATURATION: 97.4 % (ref 96–97)
O2 SATURATION: 98.4 % (ref 96–97)
O2 SATURATION: 98.6 % (ref 96–97)
O2 SATURATION: 99.5 % (ref 96–97)
O2 SATURATION: 99.9 % (ref 96–97)
PCO2 ARTERIAL: 32.5 MMHG (ref 32–45)
PCO2 ARTERIAL: 33.4 MMHG (ref 32–45)
PCO2 ARTERIAL: 33.7 MMHG (ref 32–45)
PCO2 ARTERIAL: 36.4 MMHG (ref 32–45)
PCO2 ARTERIAL: 36.6 MMHG (ref 32–45)
PCO2 ARTERIAL: 37.3 MMHG (ref 32–45)
PCO2 ARTERIAL: 38.3 MMHG (ref 32–45)
PCO2 ARTERIAL: 38.6 MMHG (ref 32–45)
PCO2 ARTERIAL: 41 MMHG (ref 32–45)
PCO2 ARTERIAL: 43.1 MMHG (ref 32–45)
PCO2 ARTERIAL: 43.8 MMHG (ref 32–45)
PCO2 ARTERIAL: 44.3 MMHG (ref 32–45)
PCO2 ARTERIAL: 45.9 MMHG (ref 32–45)
PCO2 ARTERIAL: 47.7 MMHG (ref 32–45)
PDW BLD-RTO: 13.1 % (ref 11.7–14.9)
PH BLOOD: 7.32 (ref 7.34–7.45)
PH BLOOD: 7.33 (ref 7.34–7.45)
PH BLOOD: 7.33 (ref 7.34–7.45)
PH BLOOD: 7.34 (ref 7.34–7.45)
PH BLOOD: 7.35 (ref 7.34–7.45)
PH BLOOD: 7.36 (ref 7.34–7.45)
PH BLOOD: 7.37 (ref 7.34–7.45)
PH BLOOD: 7.39 (ref 7.34–7.45)
PH BLOOD: 7.41 (ref 7.34–7.45)
PH BLOOD: 7.41 (ref 7.34–7.45)
PH BLOOD: 7.42 (ref 7.34–7.45)
PH BLOOD: 7.43 (ref 7.34–7.45)
PLATELET # BLD: 168 K/CU MM (ref 140–440)
PMV BLD AUTO: 11.5 FL (ref 7.5–11.1)
PO2 ARTERIAL: 100 MMHG (ref 75–100)
PO2 ARTERIAL: 109.7 MMHG (ref 75–100)
PO2 ARTERIAL: 126.9 MMHG (ref 75–100)
PO2 ARTERIAL: 158.8 MMHG (ref 75–100)
PO2 ARTERIAL: 264.9 MMHG (ref 75–100)
PO2 ARTERIAL: 475 MMHG (ref 75–100)
PO2 ARTERIAL: 536.4 MMHG (ref 75–100)
PO2 ARTERIAL: 634.6 MMHG (ref 75–100)
PO2 ARTERIAL: 69.8 MMHG (ref 75–100)
PO2 ARTERIAL: 73.3 MMHG (ref 75–100)
PO2 ARTERIAL: 88 MMHG (ref 75–100)
PO2 ARTERIAL: 90.1 MMHG (ref 75–100)
PO2 ARTERIAL: 95.1 MMHG (ref 75–100)
PO2 ARTERIAL: 99.8 MMHG (ref 75–100)
POC ACT PLUS: 109 SEC
POC ACT PLUS: 447 SEC
POC ACT PLUS: 466 SEC
POC ACT PLUS: 526 SEC
POC ACT PLUS: 98 SEC
POC CALCIUM: 1.04 MMOL/L (ref 1.12–1.32)
POC CALCIUM: 1.13 MMOL/L (ref 1.12–1.32)
POC CALCIUM: 1.15 MMOL/L (ref 1.12–1.32)
POC CALCIUM: 1.17 MMOL/L (ref 1.12–1.32)
POC CALCIUM: 1.18 MMOL/L (ref 1.12–1.32)
POC CALCIUM: 1.19 MMOL/L (ref 1.12–1.32)
POC CALCIUM: 1.2 MMOL/L (ref 1.12–1.32)
POC CALCIUM: 1.21 MMOL/L (ref 1.12–1.32)
POC CALCIUM: 1.21 MMOL/L (ref 1.12–1.32)
POC CALCIUM: 1.23 MMOL/L (ref 1.12–1.32)
POC CALCIUM: 1.24 MMOL/L (ref 1.12–1.32)
POC CALCIUM: 1.25 MMOL/L (ref 1.12–1.32)
POC CALCIUM: 1.28 MMOL/L (ref 1.12–1.32)
POC CALCIUM: 1.36 MMOL/L (ref 1.12–1.32)
POC CHLORIDE: 104 MMOL/L (ref 98–109)
POC CHLORIDE: 105 MMOL/L (ref 98–109)
POC CHLORIDE: 108 MMOL/L (ref 98–109)
POC CHLORIDE: 109 MMOL/L (ref 98–109)
POC CHLORIDE: 110 MMOL/L (ref 98–109)
POC CHLORIDE: 111 MMOL/L (ref 98–109)
POC CREATININE: 0.8 MG/DL (ref 0.9–1.3)
POC CREATININE: 0.9 MG/DL (ref 0.9–1.3)
POC CREATININE: 1 MG/DL (ref 0.9–1.3)
POC CREATININE: 1.1 MG/DL (ref 0.9–1.3)
POTASSIUM SERPL-SCNC: 3.3 MMOL/L (ref 3.5–4.5)
POTASSIUM SERPL-SCNC: 3.4 MMOL/L (ref 3.5–4.5)
POTASSIUM SERPL-SCNC: 3.7 MMOL/L (ref 3.5–5.1)
POTASSIUM SERPL-SCNC: 3.8 MMOL/L (ref 3.5–4.5)
POTASSIUM SERPL-SCNC: 3.8 MMOL/L (ref 3.5–4.5)
POTASSIUM SERPL-SCNC: 3.9 MMOL/L (ref 3.5–4.5)
POTASSIUM SERPL-SCNC: 3.9 MMOL/L (ref 3.5–4.5)
POTASSIUM SERPL-SCNC: 4 MMOL/L (ref 3.5–4.5)
POTASSIUM SERPL-SCNC: 4.2 MMOL/L (ref 3.5–4.5)
POTASSIUM SERPL-SCNC: 4.3 MMOL/L (ref 3.5–4.5)
POTASSIUM SERPL-SCNC: 4.3 MMOL/L (ref 3.5–4.5)
POTASSIUM SERPL-SCNC: 4.4 MMOL/L (ref 3.5–4.5)
POTASSIUM SERPL-SCNC: 4.4 MMOL/L (ref 3.5–4.5)
POTASSIUM SERPL-SCNC: 4.4 MMOL/L (ref 3.5–5.1)
POTASSIUM SERPL-SCNC: 4.5 MMOL/L (ref 3.5–4.5)
POTASSIUM SERPL-SCNC: 4.8 MMOL/L (ref 3.5–4.5)
PROTHROMBIN TIME: 14.5 SECONDS (ref 11.7–14.5)
RBC # BLD: 3.49 M/CU MM (ref 4.6–6.2)
SODIUM BLD-SCNC: 138 MMOL/L (ref 138–146)
SODIUM BLD-SCNC: 139 MMOL/L (ref 138–146)
SODIUM BLD-SCNC: 140 MMOL/L (ref 138–146)
SODIUM BLD-SCNC: 141 MMOL/L (ref 138–146)
SODIUM BLD-SCNC: 141 MMOL/L (ref 138–146)
SODIUM BLD-SCNC: 142 MMOL/L (ref 135–145)
SODIUM BLD-SCNC: 143 MMOL/L (ref 138–146)
SOURCE, BLOOD GAS: ABNORMAL
WBC # BLD: 21.2 K/CU MM (ref 4–10.5)

## 2021-09-10 PROCEDURE — 7100000000 HC PACU RECOVERY - FIRST 15 MIN

## 2021-09-10 PROCEDURE — 6360000002 HC RX W HCPCS: Performed by: THORACIC SURGERY (CARDIOTHORACIC VASCULAR SURGERY)

## 2021-09-10 PROCEDURE — 3700000000 HC ANESTHESIA ATTENDED CARE: Performed by: THORACIC SURGERY (CARDIOTHORACIC VASCULAR SURGERY)

## 2021-09-10 PROCEDURE — 71045 X-RAY EXAM CHEST 1 VIEW: CPT

## 2021-09-10 PROCEDURE — 3600000018 HC SURGERY OHS ADDTL 15MIN: Performed by: THORACIC SURGERY (CARDIOTHORACIC VASCULAR SURGERY)

## 2021-09-10 PROCEDURE — 2580000003 HC RX 258: Performed by: NURSE ANESTHETIST, CERTIFIED REGISTERED

## 2021-09-10 PROCEDURE — 2700000000 HC OXYGEN THERAPY PER DAY

## 2021-09-10 PROCEDURE — 2580000003 HC RX 258

## 2021-09-10 PROCEDURE — C1751 CATH, INF, PER/CENT/MIDLINE: HCPCS | Performed by: THORACIC SURGERY (CARDIOTHORACIC VASCULAR SURGERY)

## 2021-09-10 PROCEDURE — 2500000003 HC RX 250 WO HCPCS: Performed by: THORACIC SURGERY (CARDIOTHORACIC VASCULAR SURGERY)

## 2021-09-10 PROCEDURE — 6370000000 HC RX 637 (ALT 250 FOR IP)

## 2021-09-10 PROCEDURE — 0BH17EZ INSERTION OF ENDOTRACHEAL AIRWAY INTO TRACHEA, VIA NATURAL OR ARTIFICIAL OPENING: ICD-10-PCS | Performed by: THORACIC SURGERY (CARDIOTHORACIC VASCULAR SURGERY)

## 2021-09-10 PROCEDURE — 2780000010 HC IMPLANT OTHER: Performed by: THORACIC SURGERY (CARDIOTHORACIC VASCULAR SURGERY)

## 2021-09-10 PROCEDURE — 6360000002 HC RX W HCPCS: Performed by: NURSE ANESTHETIST, CERTIFIED REGISTERED

## 2021-09-10 PROCEDURE — P9047 ALBUMIN (HUMAN), 25%, 50ML: HCPCS

## 2021-09-10 PROCEDURE — 6370000000 HC RX 637 (ALT 250 FOR IP): Performed by: THORACIC SURGERY (CARDIOTHORACIC VASCULAR SURGERY)

## 2021-09-10 PROCEDURE — 3700000001 HC ADD 15 MINUTES (ANESTHESIA): Performed by: THORACIC SURGERY (CARDIOTHORACIC VASCULAR SURGERY)

## 2021-09-10 PROCEDURE — 80048 BASIC METABOLIC PNL TOTAL CA: CPT

## 2021-09-10 PROCEDURE — 85610 PROTHROMBIN TIME: CPT

## 2021-09-10 PROCEDURE — 02HV33Z INSERTION OF INFUSION DEVICE INTO SUPERIOR VENA CAVA, PERCUTANEOUS APPROACH: ICD-10-PCS | Performed by: THORACIC SURGERY (CARDIOTHORACIC VASCULAR SURGERY)

## 2021-09-10 PROCEDURE — P9016 RBC LEUKOCYTES REDUCED: HCPCS

## 2021-09-10 PROCEDURE — 2500000003 HC RX 250 WO HCPCS: Performed by: NURSE ANESTHETIST, CERTIFIED REGISTERED

## 2021-09-10 PROCEDURE — 02100Z9 BYPASS CORONARY ARTERY, ONE ARTERY FROM LEFT INTERNAL MAMMARY, OPEN APPROACH: ICD-10-PCS | Performed by: THORACIC SURGERY (CARDIOTHORACIC VASCULAR SURGERY)

## 2021-09-10 PROCEDURE — 85730 THROMBOPLASTIN TIME PARTIAL: CPT

## 2021-09-10 PROCEDURE — P9045 ALBUMIN (HUMAN), 5%, 250 ML: HCPCS | Performed by: PHYSICIAN ASSISTANT

## 2021-09-10 PROCEDURE — 85347 COAGULATION TIME ACTIVATED: CPT

## 2021-09-10 PROCEDURE — 06BQ4ZZ EXCISION OF LEFT SAPHENOUS VEIN, PERCUTANEOUS ENDOSCOPIC APPROACH: ICD-10-PCS | Performed by: THORACIC SURGERY (CARDIOTHORACIC VASCULAR SURGERY)

## 2021-09-10 PROCEDURE — 84132 ASSAY OF SERUM POTASSIUM: CPT

## 2021-09-10 PROCEDURE — 2720000010 HC SURG SUPPLY STERILE: Performed by: THORACIC SURGERY (CARDIOTHORACIC VASCULAR SURGERY)

## 2021-09-10 PROCEDURE — 6360000002 HC RX W HCPCS

## 2021-09-10 PROCEDURE — C1894 INTRO/SHEATH, NON-LASER: HCPCS | Performed by: THORACIC SURGERY (CARDIOTHORACIC VASCULAR SURGERY)

## 2021-09-10 PROCEDURE — 6370000000 HC RX 637 (ALT 250 FOR IP): Performed by: PHYSICIAN ASSISTANT

## 2021-09-10 PROCEDURE — 94640 AIRWAY INHALATION TREATMENT: CPT

## 2021-09-10 PROCEDURE — 99222 1ST HOSP IP/OBS MODERATE 55: CPT | Performed by: INTERNAL MEDICINE

## 2021-09-10 PROCEDURE — 021209W BYPASS CORONARY ARTERY, THREE ARTERIES FROM AORTA WITH AUTOLOGOUS VENOUS TISSUE, OPEN APPROACH: ICD-10-PCS | Performed by: THORACIC SURGERY (CARDIOTHORACIC VASCULAR SURGERY)

## 2021-09-10 PROCEDURE — 2580000003 HC RX 258: Performed by: THORACIC SURGERY (CARDIOTHORACIC VASCULAR SURGERY)

## 2021-09-10 PROCEDURE — 3600000008 HC SURGERY OHS BASE: Performed by: THORACIC SURGERY (CARDIOTHORACIC VASCULAR SURGERY)

## 2021-09-10 PROCEDURE — 6360000002 HC RX W HCPCS: Performed by: PHYSICIAN ASSISTANT

## 2021-09-10 PROCEDURE — 5A1221Z PERFORMANCE OF CARDIAC OUTPUT, CONTINUOUS: ICD-10-PCS | Performed by: THORACIC SURGERY (CARDIOTHORACIC VASCULAR SURGERY)

## 2021-09-10 PROCEDURE — 94664 DEMO&/EVAL PT USE INHALER: CPT

## 2021-09-10 PROCEDURE — 94150 VITAL CAPACITY TEST: CPT

## 2021-09-10 PROCEDURE — 85027 COMPLETE CBC AUTOMATED: CPT

## 2021-09-10 PROCEDURE — 2709999900 HC NON-CHARGEABLE SUPPLY: Performed by: THORACIC SURGERY (CARDIOTHORACIC VASCULAR SURGERY)

## 2021-09-10 PROCEDURE — 94002 VENT MGMT INPAT INIT DAY: CPT

## 2021-09-10 PROCEDURE — 5A1935Z RESPIRATORY VENTILATION, LESS THAN 24 CONSECUTIVE HOURS: ICD-10-PCS | Performed by: THORACIC SURGERY (CARDIOTHORACIC VASCULAR SURGERY)

## 2021-09-10 PROCEDURE — 2500000003 HC RX 250 WO HCPCS

## 2021-09-10 PROCEDURE — 2580000003 HC RX 258: Performed by: PHYSICIAN ASSISTANT

## 2021-09-10 PROCEDURE — 94761 N-INVAS EAR/PLS OXIMETRY MLT: CPT

## 2021-09-10 PROCEDURE — 2000000000 HC ICU R&B

## 2021-09-10 PROCEDURE — 82962 GLUCOSE BLOOD TEST: CPT

## 2021-09-10 PROCEDURE — 83735 ASSAY OF MAGNESIUM: CPT

## 2021-09-10 PROCEDURE — C1729 CATH, DRAINAGE: HCPCS | Performed by: THORACIC SURGERY (CARDIOTHORACIC VASCULAR SURGERY)

## 2021-09-10 DEVICE — Z INACTIVE USE 2540311 LEAD PACE L475MM CHN A OR V MYOCARDIAL STEROID ELUT SIL: Type: IMPLANTABLE DEVICE | Site: CHEST | Status: FUNCTIONAL

## 2021-09-10 RX ORDER — ASPIRIN 81 MG/1
81 TABLET ORAL DAILY
Status: DISCONTINUED | OUTPATIENT
Start: 2021-09-10 | End: 2021-09-14 | Stop reason: HOSPADM

## 2021-09-10 RX ORDER — SENNA AND DOCUSATE SODIUM 50; 8.6 MG/1; MG/1
1 TABLET, FILM COATED ORAL DAILY
Status: DISCONTINUED | OUTPATIENT
Start: 2021-09-10 | End: 2021-09-14 | Stop reason: HOSPADM

## 2021-09-10 RX ORDER — BISACODYL 10 MG
10 SUPPOSITORY, RECTAL RECTAL DAILY PRN
Status: DISCONTINUED | OUTPATIENT
Start: 2021-09-10 | End: 2021-09-14 | Stop reason: HOSPADM

## 2021-09-10 RX ORDER — SODIUM CHLORIDE 9 MG/ML
INJECTION, SOLUTION INTRAVENOUS CONTINUOUS PRN
Status: DISCONTINUED | OUTPATIENT
Start: 2021-09-10 | End: 2021-09-10 | Stop reason: SDUPTHER

## 2021-09-10 RX ORDER — HEPARIN SODIUM 1000 [USP'U]/ML
INJECTION, SOLUTION INTRAVENOUS; SUBCUTANEOUS PRN
Status: DISCONTINUED | OUTPATIENT
Start: 2021-09-10 | End: 2021-09-10 | Stop reason: SDUPTHER

## 2021-09-10 RX ORDER — NICOTINE POLACRILEX 4 MG
15 LOZENGE BUCCAL PRN
Status: DISCONTINUED | OUTPATIENT
Start: 2021-09-10 | End: 2021-09-13 | Stop reason: SDUPTHER

## 2021-09-10 RX ORDER — FENTANYL CITRATE 0.05 MG/ML
INJECTION, SOLUTION INTRAMUSCULAR; INTRAVENOUS PRN
Status: DISCONTINUED | OUTPATIENT
Start: 2021-09-10 | End: 2021-09-10 | Stop reason: SDUPTHER

## 2021-09-10 RX ORDER — HYDRALAZINE HYDROCHLORIDE 20 MG/ML
5 INJECTION INTRAMUSCULAR; INTRAVENOUS EVERY 5 MIN PRN
Status: DISCONTINUED | OUTPATIENT
Start: 2021-09-10 | End: 2021-09-14 | Stop reason: HOSPADM

## 2021-09-10 RX ORDER — IPRATROPIUM BROMIDE AND ALBUTEROL SULFATE 2.5; .5 MG/3ML; MG/3ML
1 SOLUTION RESPIRATORY (INHALATION)
Status: DISCONTINUED | OUTPATIENT
Start: 2021-09-10 | End: 2021-09-10

## 2021-09-10 RX ORDER — ROCURONIUM BROMIDE 10 MG/ML
INJECTION, SOLUTION INTRAVENOUS PRN
Status: DISCONTINUED | OUTPATIENT
Start: 2021-09-10 | End: 2021-09-10 | Stop reason: SDUPTHER

## 2021-09-10 RX ORDER — AMIODARONE HYDROCHLORIDE 200 MG/1
200 TABLET ORAL 3 TIMES DAILY
Status: DISPENSED | OUTPATIENT
Start: 2021-09-10 | End: 2021-09-14

## 2021-09-10 RX ORDER — SODIUM CHLORIDE, SODIUM LACTATE, POTASSIUM CHLORIDE, CALCIUM CHLORIDE 600; 310; 30; 20 MG/100ML; MG/100ML; MG/100ML; MG/100ML
INJECTION, SOLUTION INTRAVENOUS CONTINUOUS
Status: DISCONTINUED | OUTPATIENT
Start: 2021-09-10 | End: 2021-09-10

## 2021-09-10 RX ORDER — DEXTROSE MONOHYDRATE 25 G/50ML
12.5 INJECTION, SOLUTION INTRAVENOUS PRN
Status: DISCONTINUED | OUTPATIENT
Start: 2021-09-10 | End: 2021-09-13 | Stop reason: SDUPTHER

## 2021-09-10 RX ORDER — ALBUTEROL SULFATE 90 UG/1
2 AEROSOL, METERED RESPIRATORY (INHALATION) 4 TIMES DAILY
Status: DISCONTINUED | OUTPATIENT
Start: 2021-09-10 | End: 2021-09-14

## 2021-09-10 RX ORDER — CARVEDILOL 3.12 MG/1
3.12 TABLET ORAL 2 TIMES DAILY
Status: DISCONTINUED | OUTPATIENT
Start: 2021-09-10 | End: 2021-09-14 | Stop reason: HOSPADM

## 2021-09-10 RX ORDER — HYDROCODONE BITARTRATE AND ACETAMINOPHEN 5; 325 MG/1; MG/1
2 TABLET ORAL EVERY 4 HOURS PRN
Status: DISCONTINUED | OUTPATIENT
Start: 2021-09-10 | End: 2021-09-13

## 2021-09-10 RX ORDER — DEXTROSE MONOHYDRATE 50 MG/ML
100 INJECTION, SOLUTION INTRAVENOUS PRN
Status: DISCONTINUED | OUTPATIENT
Start: 2021-09-10 | End: 2021-09-13 | Stop reason: SDUPTHER

## 2021-09-10 RX ORDER — ESMOLOL HYDROCHLORIDE 10 MG/ML
INJECTION INTRAVENOUS PRN
Status: DISCONTINUED | OUTPATIENT
Start: 2021-09-10 | End: 2021-09-10 | Stop reason: SDUPTHER

## 2021-09-10 RX ORDER — MIDAZOLAM HYDROCHLORIDE 5 MG/ML
INJECTION INTRAMUSCULAR; INTRAVENOUS PRN
Status: DISCONTINUED | OUTPATIENT
Start: 2021-09-10 | End: 2021-09-10 | Stop reason: SDUPTHER

## 2021-09-10 RX ORDER — KETOROLAC TROMETHAMINE 30 MG/ML
15 INJECTION, SOLUTION INTRAMUSCULAR; INTRAVENOUS EVERY 6 HOURS PRN
Status: DISCONTINUED | OUTPATIENT
Start: 2021-09-10 | End: 2021-09-14 | Stop reason: HOSPADM

## 2021-09-10 RX ORDER — FENTANYL CITRATE 50 UG/ML
INJECTION, SOLUTION INTRAMUSCULAR; INTRAVENOUS
Status: COMPLETED
Start: 2021-09-10 | End: 2021-09-10

## 2021-09-10 RX ORDER — DEXAMETHASONE SODIUM PHOSPHATE 4 MG/ML
INJECTION, SOLUTION INTRA-ARTICULAR; INTRALESIONAL; INTRAMUSCULAR; INTRAVENOUS; SOFT TISSUE PRN
Status: DISCONTINUED | OUTPATIENT
Start: 2021-09-10 | End: 2021-09-10 | Stop reason: SDUPTHER

## 2021-09-10 RX ORDER — ATORVASTATIN CALCIUM 20 MG/1
20 TABLET, FILM COATED ORAL NIGHTLY
Status: DISCONTINUED | OUTPATIENT
Start: 2021-09-11 | End: 2021-09-14 | Stop reason: HOSPADM

## 2021-09-10 RX ORDER — POTASSIUM CHLORIDE 29.8 MG/ML
20 INJECTION INTRAVENOUS PRN
Status: DISCONTINUED | OUTPATIENT
Start: 2021-09-10 | End: 2021-09-14 | Stop reason: HOSPADM

## 2021-09-10 RX ORDER — MAGNESIUM SULFATE IN WATER 40 MG/ML
2000 INJECTION, SOLUTION INTRAVENOUS PRN
Status: DISCONTINUED | OUTPATIENT
Start: 2021-09-10 | End: 2021-09-14 | Stop reason: HOSPADM

## 2021-09-10 RX ORDER — ALBUMIN, HUMAN INJ 5% 5 %
25 SOLUTION INTRAVENOUS PRN
Status: DISCONTINUED | OUTPATIENT
Start: 2021-09-10 | End: 2021-09-14 | Stop reason: HOSPADM

## 2021-09-10 RX ORDER — AMIODARONE HYDROCHLORIDE 200 MG/1
200 TABLET ORAL DAILY
Status: DISCONTINUED | OUTPATIENT
Start: 2021-09-15 | End: 2021-09-14 | Stop reason: HOSPADM

## 2021-09-10 RX ORDER — CEFAZOLIN SODIUM 2 G/100ML
2000 INJECTION, SOLUTION INTRAVENOUS EVERY 8 HOURS
Status: COMPLETED | OUTPATIENT
Start: 2021-09-10 | End: 2021-09-12

## 2021-09-10 RX ORDER — ACETAMINOPHEN 325 MG/1
650 TABLET ORAL EVERY 4 HOURS PRN
Status: DISCONTINUED | OUTPATIENT
Start: 2021-09-10 | End: 2021-09-14 | Stop reason: HOSPADM

## 2021-09-10 RX ORDER — HYDROCODONE BITARTRATE AND ACETAMINOPHEN 5; 325 MG/1; MG/1
1 TABLET ORAL EVERY 4 HOURS PRN
Status: DISCONTINUED | OUTPATIENT
Start: 2021-09-10 | End: 2021-09-13

## 2021-09-10 RX ORDER — PROPOFOL 10 MG/ML
INJECTION, EMULSION INTRAVENOUS PRN
Status: DISCONTINUED | OUTPATIENT
Start: 2021-09-10 | End: 2021-09-10 | Stop reason: SDUPTHER

## 2021-09-10 RX ORDER — FENTANYL CITRATE 50 UG/ML
25 INJECTION, SOLUTION INTRAMUSCULAR; INTRAVENOUS
Status: ACTIVE | OUTPATIENT
Start: 2021-09-10 | End: 2021-09-11

## 2021-09-10 RX ORDER — SIMVASTATIN 40 MG
40 TABLET ORAL ONCE
Status: COMPLETED | OUTPATIENT
Start: 2021-09-10 | End: 2021-09-10

## 2021-09-10 RX ORDER — TAMSULOSIN HYDROCHLORIDE 0.4 MG/1
0.8 CAPSULE ORAL DAILY
Status: DISCONTINUED | OUTPATIENT
Start: 2021-09-10 | End: 2021-09-14 | Stop reason: HOSPADM

## 2021-09-10 RX ORDER — VANCOMYCIN HYDROCHLORIDE 1 G/200ML
1000 INJECTION, SOLUTION INTRAVENOUS ONCE
Status: DISCONTINUED | OUTPATIENT
Start: 2021-09-10 | End: 2021-09-10 | Stop reason: HOSPADM

## 2021-09-10 RX ORDER — CALCIUM GLUCONATE 20 MG/ML
2000 INJECTION, SOLUTION INTRAVENOUS PRN
Status: DISCONTINUED | OUTPATIENT
Start: 2021-09-10 | End: 2021-09-14 | Stop reason: HOSPADM

## 2021-09-10 RX ORDER — CEFAZOLIN SODIUM 2 G/100ML
2000 INJECTION, SOLUTION INTRAVENOUS ONCE
Status: COMPLETED | OUTPATIENT
Start: 2021-09-10 | End: 2021-09-10

## 2021-09-10 RX ORDER — SODIUM CHLORIDE 0.9 % (FLUSH) 0.9 %
10 SYRINGE (ML) INJECTION PRN
Status: DISCONTINUED | OUTPATIENT
Start: 2021-09-10 | End: 2021-09-14 | Stop reason: HOSPADM

## 2021-09-10 RX ORDER — CHLORHEXIDINE GLUCONATE 0.12 MG/ML
30 RINSE ORAL ONCE
Status: COMPLETED | OUTPATIENT
Start: 2021-09-10 | End: 2021-09-10

## 2021-09-10 RX ORDER — DIAPER,BRIEF,INFANT-TODD,DISP
EACH MISCELLANEOUS 2 TIMES DAILY
Status: DISCONTINUED | OUTPATIENT
Start: 2021-09-10 | End: 2021-09-14 | Stop reason: HOSPADM

## 2021-09-10 RX ORDER — ONDANSETRON 2 MG/ML
4 INJECTION INTRAMUSCULAR; INTRAVENOUS EVERY 8 HOURS PRN
Status: DISCONTINUED | OUTPATIENT
Start: 2021-09-10 | End: 2021-09-14 | Stop reason: HOSPADM

## 2021-09-10 RX ORDER — MULTIVITAMIN WITH IRON
1 TABLET ORAL
Status: DISCONTINUED | OUTPATIENT
Start: 2021-09-11 | End: 2021-09-14 | Stop reason: HOSPADM

## 2021-09-10 RX ORDER — NITROGLYCERIN 20 MG/100ML
10 INJECTION INTRAVENOUS CONTINUOUS PRN
Status: DISCONTINUED | OUTPATIENT
Start: 2021-09-10 | End: 2021-09-14 | Stop reason: HOSPADM

## 2021-09-10 RX ORDER — SODIUM CHLORIDE 450 MG/100ML
INJECTION, SOLUTION INTRAVENOUS CONTINUOUS
Status: DISCONTINUED | OUTPATIENT
Start: 2021-09-10 | End: 2021-09-13

## 2021-09-10 RX ORDER — METOPROLOL TARTRATE 5 MG/5ML
2.5 INJECTION INTRAVENOUS EVERY 10 MIN PRN
Status: DISCONTINUED | OUTPATIENT
Start: 2021-09-10 | End: 2021-09-14 | Stop reason: HOSPADM

## 2021-09-10 RX ORDER — SODIUM CHLORIDE 0.9 % (FLUSH) 0.9 %
10 SYRINGE (ML) INJECTION EVERY 12 HOURS SCHEDULED
Status: DISCONTINUED | OUTPATIENT
Start: 2021-09-10 | End: 2021-09-14 | Stop reason: HOSPADM

## 2021-09-10 RX ORDER — CARVEDILOL 3.12 MG/1
3.12 TABLET ORAL ONCE
Status: DISCONTINUED | OUTPATIENT
Start: 2021-09-10 | End: 2021-09-10 | Stop reason: HOSPADM

## 2021-09-10 RX ORDER — PHENYLEPHRINE HCL IN 0.9% NACL 1 MG/10 ML
SYRINGE (ML) INTRAVENOUS PRN
Status: DISCONTINUED | OUTPATIENT
Start: 2021-09-10 | End: 2021-09-10

## 2021-09-10 RX ORDER — FUROSEMIDE 10 MG/ML
20 INJECTION INTRAMUSCULAR; INTRAVENOUS
Status: ACTIVE | OUTPATIENT
Start: 2021-09-10 | End: 2021-09-10

## 2021-09-10 RX ORDER — SUCCINYLCHOLINE/SOD CL,ISO/PF 100 MG/5ML
SYRINGE (ML) INTRAVENOUS PRN
Status: DISCONTINUED | OUTPATIENT
Start: 2021-09-10 | End: 2021-09-10 | Stop reason: SDUPTHER

## 2021-09-10 RX ORDER — VANCOMYCIN HYDROCHLORIDE 1 G/20ML
INJECTION, POWDER, LYOPHILIZED, FOR SOLUTION INTRAVENOUS PRN
Status: DISCONTINUED | OUTPATIENT
Start: 2021-09-10 | End: 2021-09-10 | Stop reason: SDUPTHER

## 2021-09-10 RX ORDER — DIPHENHYDRAMINE HCL 25 MG
25 TABLET ORAL EVERY 6 HOURS PRN
Status: DISCONTINUED | OUTPATIENT
Start: 2021-09-10 | End: 2021-09-14 | Stop reason: HOSPADM

## 2021-09-10 RX ORDER — ONDANSETRON 2 MG/ML
INJECTION INTRAMUSCULAR; INTRAVENOUS PRN
Status: DISCONTINUED | OUTPATIENT
Start: 2021-09-10 | End: 2021-09-10 | Stop reason: SDUPTHER

## 2021-09-10 RX ORDER — SODIUM CHLORIDE 9 MG/ML
25 INJECTION, SOLUTION INTRAVENOUS PRN
Status: DISCONTINUED | OUTPATIENT
Start: 2021-09-10 | End: 2021-09-14 | Stop reason: HOSPADM

## 2021-09-10 RX ORDER — PANTOPRAZOLE SODIUM 40 MG/1
40 TABLET, DELAYED RELEASE ORAL DAILY
Status: DISCONTINUED | OUTPATIENT
Start: 2021-09-10 | End: 2021-09-14 | Stop reason: HOSPADM

## 2021-09-10 RX ORDER — LIDOCAINE HYDROCHLORIDE 20 MG/ML
INJECTION, SOLUTION INTRAVENOUS PRN
Status: DISCONTINUED | OUTPATIENT
Start: 2021-09-10 | End: 2021-09-10 | Stop reason: SDUPTHER

## 2021-09-10 RX ORDER — VECURONIUM BROMIDE 1 MG/ML
INJECTION, POWDER, LYOPHILIZED, FOR SOLUTION INTRAVENOUS PRN
Status: DISCONTINUED | OUTPATIENT
Start: 2021-09-10 | End: 2021-09-10 | Stop reason: SDUPTHER

## 2021-09-10 RX ORDER — PROTAMINE SULFATE 10 MG/ML
INJECTION, SOLUTION INTRAVENOUS PRN
Status: DISCONTINUED | OUTPATIENT
Start: 2021-09-10 | End: 2021-09-10 | Stop reason: SDUPTHER

## 2021-09-10 RX ORDER — SODIUM PHOSPHATE, DIBASIC AND SODIUM PHOSPHATE, MONOBASIC 7; 19 G/133ML; G/133ML
1 ENEMA RECTAL DAILY PRN
Status: DISCONTINUED | OUTPATIENT
Start: 2021-09-10 | End: 2021-09-14 | Stop reason: HOSPADM

## 2021-09-10 RX ADMIN — DEXAMETHASONE SODIUM PHOSPHATE 8 MG: 4 INJECTION, SOLUTION INTRAMUSCULAR; INTRAVENOUS at 07:20

## 2021-09-10 RX ADMIN — PROPOFOL 50 MG: 10 INJECTION, EMULSION INTRAVENOUS at 07:53

## 2021-09-10 RX ADMIN — Medication 2 PUFF: at 20:18

## 2021-09-10 RX ADMIN — FENTANYL CITRATE 100 MCG: 50 INJECTION, SOLUTION INTRAMUSCULAR; INTRAVENOUS at 09:01

## 2021-09-10 RX ADMIN — FENTANYL CITRATE 50 MCG: 50 INJECTION, SOLUTION INTRAMUSCULAR; INTRAVENOUS at 11:43

## 2021-09-10 RX ADMIN — SODIUM CHLORIDE: 9 INJECTION, SOLUTION INTRAVENOUS at 05:55

## 2021-09-10 RX ADMIN — ONDANSETRON 4 MG: 2 INJECTION INTRAMUSCULAR; INTRAVENOUS at 11:00

## 2021-09-10 RX ADMIN — INSULIN HUMAN 5 UNITS/HR: 1 INJECTION, SOLUTION INTRAVENOUS at 10:40

## 2021-09-10 RX ADMIN — CEFAZOLIN SODIUM 2000 MG: 2 INJECTION, SOLUTION INTRAVENOUS at 23:45

## 2021-09-10 RX ADMIN — ALBUTEROL SULFATE 2 PUFF: 90 AEROSOL, METERED RESPIRATORY (INHALATION) at 16:22

## 2021-09-10 RX ADMIN — FENTANYL CITRATE 100 MCG: 50 INJECTION, SOLUTION INTRAMUSCULAR; INTRAVENOUS at 11:49

## 2021-09-10 RX ADMIN — CHLORHEXIDINE GLUCONATE: 4 LIQUID TOPICAL at 06:19

## 2021-09-10 RX ADMIN — VECURONIUM BROMIDE FOR INJECTION 1 MG: 1 INJECTION, POWDER, LYOPHILIZED, FOR SOLUTION INTRAVENOUS at 09:00

## 2021-09-10 RX ADMIN — HYDROCODONE BITARTRATE AND ACETAMINOPHEN 2 TABLET: 5; 325 TABLET ORAL at 18:33

## 2021-09-10 RX ADMIN — 0.12% CHLORHEXIDINE GLUCONATE 30 ML: 1.2 RINSE ORAL at 06:19

## 2021-09-10 RX ADMIN — Medication 2 PUFF: at 16:23

## 2021-09-10 RX ADMIN — HYDROCORTISONE: 1 CREAM TOPICAL at 22:22

## 2021-09-10 RX ADMIN — ALBUMIN (HUMAN) 25 G: 12.5 INJECTION, SOLUTION INTRAVENOUS at 14:32

## 2021-09-10 RX ADMIN — FENTANYL CITRATE 100 MCG: 50 INJECTION, SOLUTION INTRAMUSCULAR; INTRAVENOUS at 07:56

## 2021-09-10 RX ADMIN — FENTANYL CITRATE 100 MCG: 50 INJECTION, SOLUTION INTRAMUSCULAR; INTRAVENOUS at 10:27

## 2021-09-10 RX ADMIN — SODIUM CHLORIDE, PRESERVATIVE FREE 10 ML: 5 INJECTION INTRAVENOUS at 21:39

## 2021-09-10 RX ADMIN — MIDAZOLAM 1 MG: 5 INJECTION INTRAMUSCULAR; INTRAVENOUS at 07:56

## 2021-09-10 RX ADMIN — POTASSIUM CHLORIDE 20 MEQ: 29.8 INJECTION, SOLUTION INTRAVENOUS at 13:24

## 2021-09-10 RX ADMIN — DIPHENHYDRAMINE HYDROCHLORIDE 25 MG: 25 TABLET ORAL at 19:02

## 2021-09-10 RX ADMIN — SODIUM CHLORIDE: 9 INJECTION, SOLUTION INTRAVENOUS at 07:42

## 2021-09-10 RX ADMIN — FENTANYL CITRATE 100 MCG: 50 INJECTION, SOLUTION INTRAMUSCULAR; INTRAVENOUS at 07:29

## 2021-09-10 RX ADMIN — FENTANYL CITRATE 25 MCG: 50 INJECTION, SOLUTION INTRAMUSCULAR; INTRAVENOUS at 11:45

## 2021-09-10 RX ADMIN — ALBUMIN (HUMAN) 25 G: 12.5 INJECTION, SOLUTION INTRAVENOUS at 18:04

## 2021-09-10 RX ADMIN — HEPARIN SODIUM 25000 UNITS: 1000 INJECTION, SOLUTION INTRAVENOUS; SUBCUTANEOUS at 08:34

## 2021-09-10 RX ADMIN — ALBUMIN (HUMAN) 25 G: 12.5 INJECTION, SOLUTION INTRAVENOUS at 12:51

## 2021-09-10 RX ADMIN — FENTANYL CITRATE 100 MCG: 50 INJECTION, SOLUTION INTRAMUSCULAR; INTRAVENOUS at 08:43

## 2021-09-10 RX ADMIN — CEFAZOLIN SODIUM 2000 MG: 2 INJECTION, SOLUTION INTRAVENOUS at 07:25

## 2021-09-10 RX ADMIN — PROTAMINE SULFATE 275 MG: 10 INJECTION, SOLUTION INTRAVENOUS at 10:34

## 2021-09-10 RX ADMIN — MIDAZOLAM 2 MG: 5 INJECTION INTRAMUSCULAR; INTRAVENOUS at 11:00

## 2021-09-10 RX ADMIN — PROPOFOL 70 MG: 10 INJECTION, EMULSION INTRAVENOUS at 07:11

## 2021-09-10 RX ADMIN — EPINEPHRINE 3 MCG/MIN: 1 INJECTION INTRAMUSCULAR; INTRAVENOUS; SUBCUTANEOUS at 10:20

## 2021-09-10 RX ADMIN — VANCOMYCIN 1000 MG: 1 INJECTION, SOLUTION INTRAVENOUS at 08:10

## 2021-09-10 RX ADMIN — Medication 100 MG: at 07:12

## 2021-09-10 RX ADMIN — SIMVASTATIN 40 MG: 40 TABLET, FILM COATED ORAL at 06:22

## 2021-09-10 RX ADMIN — AMIODARONE HYDROCHLORIDE 200 MG: 200 TABLET ORAL at 21:38

## 2021-09-10 RX ADMIN — MIDAZOLAM 2 MG: 5 INJECTION INTRAMUSCULAR; INTRAVENOUS at 07:11

## 2021-09-10 RX ADMIN — MIDAZOLAM 1 MG: 5 INJECTION INTRAMUSCULAR; INTRAVENOUS at 10:21

## 2021-09-10 RX ADMIN — PROPOFOL 50 MG: 10 INJECTION, EMULSION INTRAVENOUS at 07:28

## 2021-09-10 RX ADMIN — Medication: at 21:38

## 2021-09-10 RX ADMIN — MIDAZOLAM 2 MG: 5 INJECTION INTRAMUSCULAR; INTRAVENOUS at 06:59

## 2021-09-10 RX ADMIN — KETOROLAC TROMETHAMINE 15 MG: 30 INJECTION, SOLUTION INTRAMUSCULAR; INTRAVENOUS at 15:25

## 2021-09-10 RX ADMIN — LIDOCAINE HYDROCHLORIDE 100 MG: 20 INJECTION, SOLUTION INTRAVENOUS at 07:11

## 2021-09-10 RX ADMIN — ESMOLOL HYDROCHLORIDE 10 MG: 10 INJECTION, SOLUTION INTRAVENOUS at 08:10

## 2021-09-10 RX ADMIN — VECURONIUM BROMIDE FOR INJECTION 1 MG: 1 INJECTION, POWDER, LYOPHILIZED, FOR SOLUTION INTRAVENOUS at 08:40

## 2021-09-10 RX ADMIN — FENTANYL CITRATE 100 MCG: 50 INJECTION, SOLUTION INTRAMUSCULAR; INTRAVENOUS at 11:23

## 2021-09-10 RX ADMIN — SODIUM CHLORIDE: 4.5 INJECTION, SOLUTION INTRAVENOUS at 12:48

## 2021-09-10 RX ADMIN — MIDAZOLAM 1 MG: 5 INJECTION INTRAMUSCULAR; INTRAVENOUS at 10:45

## 2021-09-10 RX ADMIN — FENTANYL CITRATE 250 MCG: 50 INJECTION, SOLUTION INTRAMUSCULAR; INTRAVENOUS at 07:11

## 2021-09-10 RX ADMIN — MIDAZOLAM 1 MG: 5 INJECTION INTRAMUSCULAR; INTRAVENOUS at 09:01

## 2021-09-10 RX ADMIN — Medication: at 06:19

## 2021-09-10 RX ADMIN — ROCURONIUM BROMIDE 50 MG: 10 INJECTION INTRAVENOUS at 07:30

## 2021-09-10 RX ADMIN — CEFAZOLIN SODIUM 2000 MG: 2 INJECTION, SOLUTION INTRAVENOUS at 15:15

## 2021-09-10 RX ADMIN — ESMOLOL HYDROCHLORIDE 20 MG: 10 INJECTION, SOLUTION INTRAVENOUS at 08:02

## 2021-09-10 RX ADMIN — VECURONIUM BROMIDE FOR INJECTION 2 MG: 1 INJECTION, POWDER, LYOPHILIZED, FOR SOLUTION INTRAVENOUS at 08:05

## 2021-09-10 RX ADMIN — AMINOCAPROIC ACID 5 G/HR: 250 INJECTION, SOLUTION INTRAVENOUS at 08:00

## 2021-09-10 RX ADMIN — ALBUMIN (HUMAN) 25 G: 12.5 INJECTION, SOLUTION INTRAVENOUS at 11:43

## 2021-09-10 RX ADMIN — FAMOTIDINE 20 MG: 10 INJECTION, SOLUTION INTRAVENOUS at 21:38

## 2021-09-10 RX ADMIN — ALBUTEROL SULFATE 2 PUFF: 90 AEROSOL, METERED RESPIRATORY (INHALATION) at 20:18

## 2021-09-10 RX ADMIN — PROPOFOL 30 MG: 10 INJECTION, EMULSION INTRAVENOUS at 07:19

## 2021-09-10 ASSESSMENT — PULMONARY FUNCTION TESTS
PIF_VALUE: 14
PIF_VALUE: 16
PIF_VALUE: 14
PIF_VALUE: 1
PIF_VALUE: 1
PIF_VALUE: 14
PIF_VALUE: 1
PIF_VALUE: 14
PIF_VALUE: 16
PIF_VALUE: 0
PIF_VALUE: 16
PIF_VALUE: 0
PIF_VALUE: 15
PIF_VALUE: 16
PIF_VALUE: 2
PIF_VALUE: 1
PIF_VALUE: 0
PIF_VALUE: 15
PIF_VALUE: 16
PIF_VALUE: 1
PIF_VALUE: 15
PIF_VALUE: 16
PIF_VALUE: 1
PIF_VALUE: 0
PIF_VALUE: 1
PIF_VALUE: 16
PIF_VALUE: 13
PIF_VALUE: 15
PIF_VALUE: 16
PIF_VALUE: 14
PIF_VALUE: 5
PIF_VALUE: 1
PIF_VALUE: 16
PIF_VALUE: 15
PIF_VALUE: 1
PIF_VALUE: 15
PIF_VALUE: 14
PIF_VALUE: 15
PIF_VALUE: 1
PIF_VALUE: 14
PIF_VALUE: 15
PIF_VALUE: 1
PIF_VALUE: 1
PIF_VALUE: 16
PIF_VALUE: 1
PIF_VALUE: 1
PIF_VALUE: 15
PIF_VALUE: 18
PIF_VALUE: 16
PIF_VALUE: 14
PIF_VALUE: 15
PIF_VALUE: 16
PIF_VALUE: 15
PIF_VALUE: 1
PIF_VALUE: 14
PIF_VALUE: 13
PIF_VALUE: 16
PIF_VALUE: 0
PIF_VALUE: 15
PIF_VALUE: 16
PIF_VALUE: 14
PIF_VALUE: 16
PIF_VALUE: 16
PIF_VALUE: 1
PIF_VALUE: 14
PIF_VALUE: 14
PIF_VALUE: 15
PIF_VALUE: 1
PIF_VALUE: 16
PIF_VALUE: 15
PIF_VALUE: 1
PIF_VALUE: 1
PIF_VALUE: 2
PIF_VALUE: 15
PIF_VALUE: 13
PIF_VALUE: 15
PIF_VALUE: 15
PIF_VALUE: 1
PIF_VALUE: 15
PIF_VALUE: 16
PIF_VALUE: 14
PIF_VALUE: 1
PIF_VALUE: 16
PIF_VALUE: 1
PIF_VALUE: 16
PIF_VALUE: 15
PIF_VALUE: 1
PIF_VALUE: 14
PIF_VALUE: 1
PIF_VALUE: 0
PIF_VALUE: 4
PIF_VALUE: 0
PIF_VALUE: 1
PIF_VALUE: 16
PIF_VALUE: 15
PIF_VALUE: 14
PIF_VALUE: 16
PIF_VALUE: 1
PIF_VALUE: 15
PIF_VALUE: 16
PIF_VALUE: 1
PIF_VALUE: 1
PIF_VALUE: 16
PIF_VALUE: 16
PIF_VALUE: 1
PIF_VALUE: 14
PIF_VALUE: 1
PIF_VALUE: 15
PIF_VALUE: 2
PIF_VALUE: 1
PIF_VALUE: 16
PIF_VALUE: 1
PIF_VALUE: 16
PIF_VALUE: 13
PIF_VALUE: 1
PIF_VALUE: 14
PIF_VALUE: 1
PIF_VALUE: 1
PIF_VALUE: 15
PIF_VALUE: 13
PIF_VALUE: 1
PIF_VALUE: 15
PIF_VALUE: 16
PIF_VALUE: 16
PIF_VALUE: 1
PIF_VALUE: 15
PIF_VALUE: 1
PIF_VALUE: 14
PIF_VALUE: 14
PIF_VALUE: 15
PIF_VALUE: 17
PIF_VALUE: 16
PIF_VALUE: 1
PIF_VALUE: 15
PIF_VALUE: 14
PIF_VALUE: 14
PIF_VALUE: 16
PIF_VALUE: 13
PIF_VALUE: 15
PIF_VALUE: 14
PIF_VALUE: 0
PIF_VALUE: 0
PIF_VALUE: 16
PIF_VALUE: 0
PIF_VALUE: 15
PIF_VALUE: 14
PIF_VALUE: 14
PIF_VALUE: 1
PIF_VALUE: 1
PIF_VALUE: 17
PIF_VALUE: 0
PIF_VALUE: 1
PIF_VALUE: 14
PIF_VALUE: 1
PIF_VALUE: 14
PIF_VALUE: 1
PIF_VALUE: 14
PIF_VALUE: 1
PIF_VALUE: 14
PIF_VALUE: 15
PIF_VALUE: 14
PIF_VALUE: 15
PIF_VALUE: 14
PIF_VALUE: 16
PIF_VALUE: 16
PIF_VALUE: 15
PIF_VALUE: 15
PIF_VALUE: 14
PIF_VALUE: 1
PIF_VALUE: 1
PIF_VALUE: 14
PIF_VALUE: 16
PIF_VALUE: 14
PIF_VALUE: 16
PIF_VALUE: 15
PIF_VALUE: 15
PIF_VALUE: 14
PIF_VALUE: 16
PIF_VALUE: 14
PIF_VALUE: 13
PIF_VALUE: 14
PIF_VALUE: 16
PIF_VALUE: 1
PIF_VALUE: 15
PIF_VALUE: 14
PIF_VALUE: 17
PIF_VALUE: 1
PIF_VALUE: 1
PIF_VALUE: 16
PIF_VALUE: 15
PIF_VALUE: 15
PIF_VALUE: 16
PIF_VALUE: 1
PIF_VALUE: 16
PIF_VALUE: 14
PIF_VALUE: 16
PIF_VALUE: 1
PIF_VALUE: 2
PIF_VALUE: 14
PIF_VALUE: 16
PIF_VALUE: 14
PIF_VALUE: 16
PIF_VALUE: 14
PIF_VALUE: 1
PIF_VALUE: 14
PIF_VALUE: 15
PIF_VALUE: 14
PIF_VALUE: 15
PIF_VALUE: 1
PIF_VALUE: 0
PIF_VALUE: 11
PIF_VALUE: 1
PIF_VALUE: 1
PIF_VALUE: 15
PIF_VALUE: 16
PIF_VALUE: 14
PIF_VALUE: 14
PIF_VALUE: 15
PIF_VALUE: 1
PIF_VALUE: 0
PIF_VALUE: 15
PIF_VALUE: 16
PIF_VALUE: 14
PIF_VALUE: 13
PIF_VALUE: 16
PIF_VALUE: 0
PIF_VALUE: 14
PIF_VALUE: 1
PIF_VALUE: 4
PIF_VALUE: 13
PIF_VALUE: 12
PIF_VALUE: 1
PIF_VALUE: 1
PIF_VALUE: 15
PIF_VALUE: 1
PIF_VALUE: 16
PIF_VALUE: 0
PIF_VALUE: 14
PIF_VALUE: 1
PIF_VALUE: 1
PIF_VALUE: 15
PIF_VALUE: 15
PIF_VALUE: 16

## 2021-09-10 ASSESSMENT — PAIN DESCRIPTION - ORIENTATION
ORIENTATION: MID

## 2021-09-10 ASSESSMENT — PAIN DESCRIPTION - DIRECTION
RADIATING_TOWARDS: TOWARDS BACK

## 2021-09-10 ASSESSMENT — PAIN SCALES - GENERAL
PAINLEVEL_OUTOF10: 3
PAINLEVEL_OUTOF10: 8
PAINLEVEL_OUTOF10: 0
PAINLEVEL_OUTOF10: 4
PAINLEVEL_OUTOF10: 8
PAINLEVEL_OUTOF10: 8
PAINLEVEL_OUTOF10: 3

## 2021-09-10 ASSESSMENT — PAIN DESCRIPTION - PAIN TYPE
TYPE: SURGICAL PAIN

## 2021-09-10 ASSESSMENT — PAIN DESCRIPTION - FREQUENCY
FREQUENCY: CONTINUOUS

## 2021-09-10 ASSESSMENT — PAIN DESCRIPTION - LOCATION
LOCATION: CHEST

## 2021-09-10 ASSESSMENT — PAIN DESCRIPTION - ONSET
ONSET: ON-GOING

## 2021-09-10 ASSESSMENT — PAIN DESCRIPTION - DESCRIPTORS
DESCRIPTORS: ACHING;DISCOMFORT;DULL
DESCRIPTORS: CONSTANT;ACHING
DESCRIPTORS: DULL
DESCRIPTORS: ACHING;DULL;DISCOMFORT

## 2021-09-10 ASSESSMENT — PAIN DESCRIPTION - PROGRESSION
CLINICAL_PROGRESSION: GRADUALLY IMPROVING
CLINICAL_PROGRESSION: GRADUALLY WORSENING
CLINICAL_PROGRESSION: GRADUALLY WORSENING
CLINICAL_PROGRESSION: GRADUALLY IMPROVING

## 2021-09-10 ASSESSMENT — PAIN - FUNCTIONAL ASSESSMENT
PAIN_FUNCTIONAL_ASSESSMENT: PREVENTS OR INTERFERES WITH ALL ACTIVE AND SOME PASSIVE ACTIVITIES
PAIN_FUNCTIONAL_ASSESSMENT: PREVENTS OR INTERFERES SOME ACTIVE ACTIVITIES AND ADLS
PAIN_FUNCTIONAL_ASSESSMENT: PREVENTS OR INTERFERES WITH ALL ACTIVE AND SOME PASSIVE ACTIVITIES
PAIN_FUNCTIONAL_ASSESSMENT: PREVENTS OR INTERFERES SOME ACTIVE ACTIVITIES AND ADLS

## 2021-09-10 NOTE — ANESTHESIA POSTPROCEDURE EVALUATION
Department of Anesthesiology  Postprocedure Note    Patient: Karen Muñoz  MRN: [id number]  YOB: 1945  Date of evaluation: 9/10/2021  Time:  11:51 AM     Procedure Summary     Date: 09/10/21 Room / Location: [address]    Anesthesia Start: 6365 Anesthesia Stop: 9668    Procedure: CABG CORONARY ARTERY BYPASS (N/A Chest) Diagnosis: (CAD)    Surgeons: Kelly Bhat MD Responsible Provider: Amelia Veras MD    Anesthesia Type: general ASA Status: 4          Anesthesia Type: general    [place] Phase I:  7    [place] Phase II:  7    Last vitals: Reviewed and per EMR flowsheets.        Anesthesia Post Evaluation    Patient location during evaluation: ICU  Patient participation: complete - patient cannot participate  Level of consciousness: sedated and ventilated  Pain score: 0  Airway patency: patent  Nausea & Vomiting: no nausea and no vomiting  Complications: no  Cardiovascular status: hemodynamically stable and vasoactive/inotropes  Respiratory status: ventilator  Hydration status: stable

## 2021-09-10 NOTE — PROGRESS NOTES
Pt waking up, able to follow commands, squeeze hands and wiggle toes equal bilaterally. Will call RT to change vent settings when available.

## 2021-09-10 NOTE — CONSULTS
CARDIOLOGY CONSULT NOTE   Reason for consultation:  POST CABG     Referring physician:  Hima Batista MD     Primary care physician: Manuel Alexander MD      Dear  Dr. Hima Batista MD   Thanks for the consult. Chief Complaints : Coronary artery disease    History of present illness:Ed is a 78 y. o.year old who had CABG X3 patient known to me from outpatient service. He is extubated and overall doing well cardiology was asked to see him for comanagement    Past medical history:    has a past medical history of Abnormal Heart Score CT, Arthritis, CAD (coronary artery disease), Cancer ([facility] [address] Diabetes mellitus ([facility] [address] Elevated d-dimer, Fatigue, H/O echocardiogram, History of exercise stress test, History of nuclear stress test, Hyperlipidemia, Hypertension, Obesity, and Psoriasis. Past surgical history:   has a past surgical history that includes fracture surgery (Right, 1969); fracture surgery (Left, 1994); fracture surgery (2011); Cholecystectomy (1999); hernia repair (1999); cyst incision and drainage (Right, 2013); Colonoscopy (10/22/2003); Colonoscopy (2010); Colonoscopy (12/05/2017); and Bladder surgery. Social History:   reports that he has been smoking cigars. He has never used smokeless tobacco. He reports current alcohol use. He reports that he does not use drugs.   Family history:   no family history of CAD, STROKE of DM at early age    Allergies   Allergen Reactions    Clarithromycin     Levaquin [Levofloxacin] Hives    Bicillin [Penicillin G Benzathine] Rash       tamsulosin (FLOMAX) capsule 0.8 mg, Daily  0.45 % sodium chloride infusion, Continuous  sodium chloride flush 0.9 % injection 10 mL, 2 times per day  sodium chloride flush 0.9 % injection 10 mL, PRN  0.9 % sodium chloride infusion, PRN  ondansetron (ZOFRAN) injection 4 mg, Q8H PRN  aspirin EC tablet 81 mg, Daily  acetaminophen (TYLENOL) tablet 650 mg, Q4H PRN  HYDROcodone-acetaminophen (NORCO) 5-325 MG per tablet 1 tablet, Q4H PRN Or  HYDROcodone-acetaminophen (NORCO) 5-325 MG per tablet 2 tablet, Q4H PRN  ketorolac (TORADOL) injection 15 mg, Q6H PRN  fentaNYL (SUBLIMAZE) injection 25 mcg, Q2H PRN  amiodarone (CORDARONE) tablet 200 mg, TID  hydrALAZINE (APRESOLINE) injection 5 mg, Q5 Min PRN  metoprolol (LOPRESSOR) injection 2.5 mg, Q10 Min PRN  mupirocin (BACTROBAN) 2 % ointment, BID  sodium bicarbonate 8.4 % injection 50 mEq, Q30 Min PRN  [START ON 9/11/2021] multivitamin 1 tablet, Daily with breakfast  sennosides-docusate sodium (SENOKOT-S) 8.6-50 MG tablet 1 tablet, Daily  bisacodyl (DULCOLAX) suppository 10 mg, Daily PRN  fleet rectal enema 1 enema, Daily PRN  carvedilol (COREG) tablet 3.125 mg, BID  [START ON 9/11/2021] atorvastatin (LIPITOR) tablet 20 mg, Nightly  pantoprazole (PROTONIX) tablet 40 mg, Daily  ceFAZolin (ANCEF) 2000 mg in dextrose 4 % 100 mL IVPB (premix), Q8H  potassium chloride 20 mEq/50 mL IVPB (Central Line), PRN  magnesium sulfate 2000 mg in 50 mL IVPB premix, PRN  calcium gluconate 2000 mg in sodium chloride 100 mL, PRN  albumin human 5 % IV solution 25 g, PRN  furosemide (LASIX) injection 20 mg, Once PRN  norepinephrine (LEVOPHED) 16 mg in dextrose 5% 250 mL infusion, Continuous PRN  EPINEPHrine (EPINEPHrine HCL) 5 mg in dextrose 5 % 250 mL infusion, Continuous PRN  nitroGLYCERIN 50 mg in dextrose 5% 250 mL infusion, Continuous PRN  insulin regular (HUMULIN R;NOVOLIN R) 100 Units in sodium chloride 0.9 % 100 mL infusion, Continuous  glucose (GLUTOSE) 40 % oral gel 15 g, PRN  dextrose 50 % IV solution, PRN  glucagon (rDNA) injection 1 mg, PRN  dextrose 5 % solution, PRN  albuterol sulfate  (90 Base) MCG/ACT inhaler 2 puff, 4x daily  ipratropium (ATROVENT HFA) 17 MCG/ACT inhaler 2 puff, 4x daily  [START ON 9/15/2021] amiodarone (CORDARONE) tablet 200 mg, Daily      Current Facility-Administered Medications   Medication Dose Route Frequency Provider Last Rate Last Admin    tamsulosin (FLOMAX) capsule 0.8 mg 0.8 mg Oral Daily Dina Gil PA-C        0.45 % sodium chloride infusion   IntraVENous Continuous Dina Gil PA-C 75 mL/hr at 09/10/21 1248 New Bag at 09/10/21 1248    sodium chloride flush 0.9 % injection 10 mL  10 mL IntraVENous 2 times per day Dina Gil PA-C        sodium chloride flush 0.9 % injection 10 mL  10 mL IntraVENous PRN Dina Gil PA-C        0.9 % sodium chloride infusion  25 mL IntraVENous PRN Dina Gil PA-C        [facility] injection 4 mg  4 mg IntraVENous Q8H PRN Dina Gil PA-C        aspirin EC tablet 81 mg  81 mg Oral Daily Dina Gil PA-C        acetaminophen (TYLENOL) tablet 650 mg  650 mg Oral Q4H PRN Dina Gil PA-C        HYDROcodone-acetaminophen (NORCO) 5-325 MG per tablet 1 tablet  1 tablet Oral Q4H PRN Dina Gil PA-C        Or    HYDROcodone-acetaminophen (NORCO) 5-325 MG per tablet 2 tablet  2 tablet Oral Q4H PRN Dina Gil PA-C        ketorolac (TORADOL) injection 15 mg  15 mg IntraVENous Q6H PRN Dina Gil PA-C   15 mg at 09/10/21 1525    fentaNYL (SUBLIMAZE) injection 25 mcg  25 mcg IntraVENous Q2H PRN Dina Gil PA-C   25 mcg at 09/10/21 1145    amiodarone (CORDARONE) tablet 200 mg  200 mg Oral TID Dina Gil PA-C        hydrALAZINE (APRESOLINE) injection 5 mg  5 mg IntraVENous Q5 Min PRN Dina Gil PA-C        metoprolol (LOPRESSOR) injection 2.5 mg  2.5 mg IntraVENous Q10 Min PRN Dina Gil PA-C        mupirocin (BACTROBAN) 2 % ointment   Nasal BID Dina Gil PA-C        sodium bicarbonate 8.4 % injection 50 mEq  50 mEq IntraVENous Q30 Min PRN Dina Gil PA-C        [START ON 9/11/2021] multivitamin 1 tablet  1 tablet Oral Daily with breakfast Dina Gil PA-C        sennosides-docusate sodium (SENOKOT-S) 8.6-50 MG tablet 1 tablet  1 tablet Oral Daily Dina Gil PA-C        bisacodyl (DULCOLAX) suppository 10 mg  10 mg Rectal Daily PRN Dina Gil PA-C        fleet rectal enema 1 enema  1 108 (90 Base) MCG/ACT inhaler 2 puff  2 puff Inhalation 4x daily Lisa Mai MD   2 puff at 09/10/21 1622    ipratropium (ATROVENT HFA) 17 MCG/ACT inhaler 2 puff  2 puff Inhalation 4x daily Lisa Mai MD   2 puff at 09/10/21 1623    [START ON 9/15/2021] amiodarone (CORDARONE) tablet 200 mg  200 mg Oral Daily Lisa Mai MD         Review of Systems:   · Constitutional: No Fever or Weight Loss   · Eyes: No Decreased Vision  · ENT: No Headaches, Hearing Loss or Vertigo  · Cardiovascular: As per HPI  · Respiratory: As per HPI  · Gastrointestinal: No abdominal pain, appetite loss, blood in stools, constipation, diarrhea or heartburn  · Genitourinary: No dysuria, trouble voiding, or hematuria  · Musculoskeletal:  No gait disturbance, weakness or joint complaints  · Integumentary: No rash or pruritis  · Neurological: No TIA or stroke symptoms  · Psychiatric: No anxiety or depression  · Endocrine: No malaise, fatigue or temperature intolerance  · Hematologic/Lymphatic: No bleeding problems, blood clots or swollen lymph nodes  · Allergic/Immunologic: No nasal congestion or hives  All systems negative except as marked. Physical Examination:    Vitals:    09/10/21 1730 09/10/21 1745 09/10/21 1800 09/10/21 1815   BP: 112/73 99/64 96/62 111/73   Pulse: 86 85 84 89   Resp: 13 12 10 19   Temp:       TempSrc:       SpO2:   98%    Weight:       Height:           General Appearance:  No distress, conversant    Constitutional:  Well developed, Well nourished, No acute distress, Non-toxic appearance. HENT:  Normocephalic, Atraumatic, Bilateral external ears normal, Oropharynx moist, No oral exudates, Nose normal. Neck- Normal range of motion, No tenderness, Supple, No stridor,no apical-carotid delay  Lymphatics : no palpable lymph nodes  Eyes:  PERRL, EOMI, Conjunctiva normal, No discharge. Respiratory:  Normal breath sounds, No respiratory distress, No wheezing, No chest tenderness. ,no use of accessory muscles, crackles Present  Cardiovascular: (PMI) apex non displaced,no lifts no thrills, ankle swelling Present , 1+, s1 and s2 audible,Murmur. Present, JVD not noted    Abdomen /GI:  Bowel sounds normal, Soft, No tenderness, No masses, No gross visceromegaly   :  No costovertebral angle tenderness   Musculoskeletal:  No edema, no tenderness, no deformities. Back- no tenderness  Integument:  Well hydrated, no rash   Lymphatic:  No lymphadenopathy noted   Neurologic:  Alert & oriented x 3, CN 2-12 normal, normal motor function, normal sensory function, no focal deficits noted           Medical decision making and Data review:    Lab Review   Recent Labs     09/10/21  1150 09/10/21  1251 09/10/21  1656   WBC 21.2*  --   --    HGB 10.6*   < > 9.6*   HCT 31.7*   < > 28.0*     --   --     < > = values in this interval not displayed. Recent Labs     09/10/21  1150 09/10/21  1251 09/10/21  1656 09/10/21  1656 09/10/21  1715      < > 143  --   --    K 3.7   < > 4.2   < > 4.4   *  --   --   --   --    CO2 22   < > 24  --   --    BUN 11  --   --   --   --    CREATININE 0.8*   < > 0.8*  --   --     < > = values in this interval not displayed. No results for input(s): AST, ALT, ALB, BILIDIR, BILITOT, ALKPHOS in the last 72 hours. No results for input(s): TROPONINT in the last 72 hours. No results for input(s): PROBNP in the last 72 hours.   Lab Results   Component Value Date    INR 1.12 09/10/2021    PROTIME 14.5 09/10/2021       EKG: (reviewed by myself)    ECHO:(reviewed by myself)    Chest Xray:(reviewed by myself)  Echocardiogram complete 2D with doppler with color    Result Date: 9/8/2021  Transthoracic Echocardiography Report (TTE)  Demographics   Patient Name       Paolo Lind Date of Study       09/08/2021                     E   Date of Birth      1945         Gender              Male   Age                78 year(s)         Race                   Patient Number     [id number] Room Number         CATH   Visit Number       [id number]   Corporate ID       [id number]   Accession Number   [id number]         Navin Whitfield RN   Ordering Physician Russell Lamb MD     Interpreting        Stefania Richardson MD  Procedure Type of Study   TTE procedure:ECHOCARDIOGRAM COMPLETE 2D W DOPPLER W COLOR. Procedure Date Date: 09/08/2021 Start: 01:08 PM Study Location: Portable Indications:Chest pain. Patient Status: Routine Height: 68 inches Weight: 180 pounds BSA: 1.95 m2 BMI: 27.37 kg/m2 HR: 64 bpm BP: 153/74 mmHg  Conclusions   Summary  Left ventricular systolic function is normal.  Ejection fraction is visually estimated at 50-55%. Mild left ventricular hypertrophy. Indeterminate diastolic function; E/A flow reversal is noted. Sclerotic, but non-stenotic aortic valve. No evidence of any pericardial effusion. Signature   ------------------------------------------------------------------  Electronically signed by Stefania Barber MD (Interpreting  physician) on 09/08/2021 at 02:53 PM  ------------------------------------------------------------------   Findings   Left Ventricle  Left ventricular systolic function is normal.  Ejection fraction is visually estimated at 50-55%. Mild left ventricular hypertrophy. Indeterminate diastolic function; E/A flow reversal is noted. Left ventricle size is normal.  No regional wall motion abnormalites. Left Atrium  Essentially normal left atrium. Right Atrium  Essentially normal right atrium. Right Ventricle  Essentially normal right ventricle. Aortic Valve  Sclerotic, but non-stenotic aortic valve. Mitral Valve  Mild mitral regurgitation is present. Tricuspid Valve  Trace tricuspid regurgitation is present. RVSP is 18 mmHg. Pulmonic Valve  Trace pulmonic regurgitation present.    Pericardial Effusion  No evidence of any pericardial effusion. Miscellaneous  The aorta is within normal limits.   M-Mode/2D Measurements & Calculations   LV Diastolic Dimension:  LV Systolic Dimension:  AV Cusp Separation: 1 cmLA  5.38 cm                  3.04 cm                 Dimension: 5.4 cmAO Root  LV FS:43.5 %             LV Volume Diastolic: 79 Dimension: 3.1 cmLA Area:  LV PW Diastolic: 0.24 cm ml                      17.2 cm2  LV PW Systolic: 2.27 cm  LV Volume Systolic: 32  Septum Diastolic: 1.2 cm ml  Septum Systolic: 9.75 cm LV EDV/LV EDV Index: 79  CO: 4.48 l/min           ml/41 m2LV ESV/LV ESV   RV Diastolic Dimension: 3.4  CI: 2.3 l/m*m2           Index: 32 ml/16 m2      cm                           EF Calculated (A4C):  LV Area Diastolic: 27    99.6 %                  LA/Aorta: 1.74  cm2                      EF Calculated (2D):     Ascending Aorta: 3.2 cm  LV Area Systolic: 49.7   59.5 %                  LA volume/Index: 50 ml  cm2                                              /26m2                           LV Length: 7.58 cm                            LVOT: 2.1 cm  Doppler Measurements & Calculations   MV Peak E-Wave: 54.8 AV Peak Velocity: 168 cm/s    LVOT Peak Velocity: 80.5  cm/s                 AV Peak Gradient: 11.29 mmHg  cm/s  MV Peak A-Wave: 84.4 AV Mean Velocity: 117 cm/s    LVOT Mean Velocity: 54.1  cm/s                 AV Mean Gradient: 6 mmHg      cm/s  MV E/A Ratio: 0.65   AV VTI: 35.5 cm               LVOT Peak Gradient: 3  MV Peak Gradient:    AV Area (Continuity):1.97 cm2 mmHgLVOT Mean Gradient: 1  1.2 mmHg                                           mmHg                       LVOT VTI: 20.2 cm             Estimated RVSP: 18 mmHg  MV P1/2t: 51 msec                                  Estimated RAP:3 mmHg  MVA by PHT:4.31 cm2  Estimated PASP: 18.37 mmHg   MV E' Septal                                       TR Velocity:196 cm/s  Velocity: 5.48 cm/s                                TR Gradient:15.37 mmHg  MV E' Lateral  Velocity: 4.72 cm/s  MV E/E' septal: 10  MV E/E' lateral:  11.61      XR CHEST (2 VW)    Result Date: 9/2/2021  EXAMINATION: TWO XRAY VIEWS OF THE CHEST 9/2/2021 3:51 pm COMPARISON: None. HISTORY: ORDERING SYSTEM PROVIDED HISTORY: Pre-operative cardiovascular examination TECHNOLOGIST PROVIDED HISTORY: Reason for exam:->Chest x-ray performed for pre testing for left heart catheterization with possible percutaneous coronary intervention procedure Reason for Exam: Chest x-ray performed for pre testing for left heart catheterization with possible percutaneous coronary intervention procedure FINDINGS: The cardiomediastinal silhouette is normal in size. The lungs are clear. No pleural effusion or pneumothorax is present. No acute cardiopulmonary process. CT CHEST WO CONTRAST    Result Date: 9/9/2021  EXAMINATION: CT OF THE CHEST WITHOUT CONTRAST 9/8/2021 3:36 pm TECHNIQUE: CT of the chest was performed without the administration of intravenous contrast. Multiplanar reformatted images are provided for review. Dose modulation, iterative reconstruction, and/or weight based adjustment of the mA/kV was utilized to reduce the radiation dose to as low as reasonably achievable. COMPARISON: None. HISTORY: ORDERING SYSTEM PROVIDED HISTORY: pre-op cabg TECHNOLOGIST PROVIDED HISTORY: Reason for exam:->pre-op cabg Reason for Exam: pre-op cabg Acuity: Acute Type of Exam: Initial Relevant Medical/Surgical History: hx HTN, jessika FINDINGS: Mediastinum: There is no pathologically enlarged lymphadenopathy present. The airways are normal in appearance. Severe coronary artery atherosclerotic calcifications are present. Lungs/pleura: Mild centrilobular emphysematous changes are present within the upper lobes. There is a 4 x 3 x 4 mm solid right upper lobe pulmonary nodule. There is an adjacent 3 x 3 mm solid right upper lobe pulmonary nodule. There is no focal airspace consolidation.   There is no pleural effusion or pneumothorax. Upper Abdomen: Spleen is enlarged measuring 15 cm in AP dimension. Limited evaluation the upper abdomen is otherwise unremarkable. Soft Tissues/Bones: No lytic or blastic osseous lesions are identified. 1. No acute cardiopulmonary process identified. 2. Severe coronary artery atherosclerosis. 3. Mild centrilobular emphysematous changes. 4. Two right upper lobe pulmonary nodules measure up to 4 mm. Please see follow-up recommendations below. 5. Splenomegaly of uncertain etiology. RECOMMENDATIONS: Multiple pulmonary nodules. Most severe: 4 mm right solid pulmonary nodule within the upper lobe. A non-contrast Chest CT at 12 months is optional. If performed and the nodule is stable at 12 months, no further follow-up is recommended. These guidelines do not apply to immunocompromised patients and patients with cancer. Follow up in patients with significant comorbidities as clinically warranted. For lung cancer screening, adhere to Lung-RADS guidelines. Reference: Radiology. 2017; 284(1):228-43. XR CHEST PORTABLE    Result Date: 9/10/2021  EXAMINATION: ONE XRAY VIEW OF THE CHEST 9/10/2021 12:00 pm COMPARISON: 09/02/2021 HISTORY: ORDERING SYSTEM PROVIDED HISTORY: Post op open heart surgery TECHNOLOGIST PROVIDED HISTORY: Reason for exam:->Post op open heart surgery Reason for Exam: Post op open heart surgery Acuity: Acute Type of Exam: Initial FINDINGS: Status post median sternotomy. Endotracheal tube in satisfactory position above the kortney. NG tip near GE junction. Hurley-Brielle catheter tip in main pulmonary artery. Left chest tube in place. No pneumothorax. Bibasilar hypoaeration with subsegmental atelectatic changes.      Bibasilar hypoaeration and subsegmental atelectatic changes NG tube tip near GE junction No pneumothorax     VL DUP CAROTID BILATERAL    Result Date: 9/9/2021  EXAMINATION: ULTRASOUND EVALUATION OF THE CAROTID ARTERIES 9/9/2021 TECHNIQUE: Multiple longitudinal and transverse sonograms of the carotid arteries were performed. Colored pulse wave Doppler images were also acquired. COMPARISON: None. HISTORY: ORDERING SYSTEM PROVIDED HISTORY: Pre-testing, surgery 9/10/21 TECHNOLOGIST PROVIDED HISTORY: Reason for exam:->Pre-testing, surgery 9/10/21 Reason for Exam: pre op Acuity: Unknown Type of Exam: Initial FINDINGS: RIGHT: The right common carotid artery demonstrates peak systolic velocities of 86 and 59 cm/sec in the proximal and distal segments respectively. The right internal carotid artery demonstrates the systolic velocities of 44, 69, and 64 cm/sec in the proximal, mid and distal segments respectively. The external carotid artery is patent. The vertebral artery demonstrates normal antegrade flow. No evidence of focal atherosclerotic plaque. ICA/CCA ratio of 0.8. LEFT: The left common carotid artery demonstrates peak systolic velocities of 91 and 58 cm/sec in the proximal and distal segments respectively. The left internal carotid artery demonstrates the systolic velocities of 56, 52, and 55 cm/sec in the proximal, mid and distal segments respectively. The external carotid artery is patent. The vertebral artery demonstrates normal antegrade flow. No evidence of focal atherosclerotic plaque. ICA/CCA ratio of 0.7. The right internal carotid artery demonstrates 0-50% stenosis. The left internal carotid artery demonstrates 0-50% stenosis. Bilateral vertebral arteries are patent with flow in the normal direction. US LOWER EXTREMITY UNILATERAL VEIN MAPPING    Result Date: 9/9/2021  EXAMINATION: ULTRASOUND OF THE BILATERAL LEFT LOWER EXTREMITY FOR VEIN MAPPING, 9/9/2021 9:55 am TECHNIQUE: Sonographic evaluation of the greater saphenous vein was performed. Color flow Doppler imaging was also acquired. COMPARISON: None.  HISTORY: ORDERING SYSTEM PROVIDED HISTORY: Pre-testing TECHNOLOGIST PROVIDED HISTORY: Surgery 9/10/21 Reason for exam:->Pre-testing Reason for Exam: pre op Acuity: Unknown Type of Exam: Initial FINDINGS: Sapheno-femoral junction: 7.3mm. Proximal thigh:  4.7mm. Mid thigh:  4.3mm. Distal thigh:  4.4mm. Knee:  3.5mm. Proximal calf: 3.8mm. Mid calf:  6.1mm. Ankle:  3.5mm. Greater saphenous vein is patent with measurements ranging from 3.5 to 7.3mm as discussed above. NM MYOCARDIAL SPECT REST EXERCISE OR RX    Result Date: 8/23/2021  Cardiac Perfusion Imaging   Demographics   Patient Name      Cam Pompa Date of study        08/23/2021                    E   Date of Birth     1945         Gender               Male   Age               78 year(s)         Race                    Patient Number    [id number]           Room Number   Visit Number      [id number]          Height               68 inches   Corporate ID      [id number]           Weight               180 pounds   Accession Number  [id number]   Referring         Annette MILLER Technologist      Greg Lutz  Physician         MD Eva Morrison MD  Physician                            Cardiologist   The procedure was explained in detail to the patient. Risks,  complications and alternative treatments were reviewed. Written consent  was obtained. Conclusions   Summary  Abnormal Study. Limited exercise capacity. 5 METs work load. Physiological BP response to exercise. ETT negative for Ischemia / Arrhythmia. Medium sized area of moderate inferior wall Ischemia. Normal LV function. LVEF is 55 %. Supervising physician Dr. Shayy Bowman . Recommendation  Recommendations: Schedule out patient visit to discuss results.    Signatures   ------------------------------------------------------------------  Electronically signed by Richard Cervantes MD (Interpreting  cardiologist) on 08/23/2021 at 18:59  ------------------------------------------------------------------  Procedure Procedure Type:   Nuclear Stress Test:NM MYOCARDIAL SPECT REST EXERCISE OR RX  Indications: Chest pain. Risk Factors   The patient risk factors include:Current - Every day tobacco use, treated  hypercholesterolemia, treated hypertension, family history of premature CAD  and diabetes mellitus. Stress Protocols   Resting ECG  Normal sinus rhythm. Within normal limits. Resting HR:110 bpm  Resting BP:124/70 mmHg  Stress Protocol:Exercise - Brain  Peak HR:146 bpm                          HR response: Appropriate  Peak BP:140/80 mmHg                      BP response: Normal resting BP -  Predicted HR: 146 bpm                    appropriate response  % of predicted HR: 100                   HR/BP product:[id number]                                           Max exrecise: 4.6 METS  Exercise duration: 03:00 min  Reason for termination:Target heart rate Exercise effort:Poor   ECG Findings  No ischemic EKG changes. Arrhythmias  No rhythm abnormality. Symptoms  Shortness of breath. Fatigue. Palpitations. Complications  Procedure complication was none. Stress Interpretation  Limited exercise capacity. 5 METs work load. Physiological BP response to exercise. ETT negative for Ischemia / Arrhythmia.    Imaging Protocols   Rest                                Stress   Isotope:Sestamibi 99mTc             Isotope: Sestamibi 99mTc  Isotope dose:28.9 mCi               Isotope dose:9.7 mCi  Administration route: I.V. Rt. arm  Administration route: I.V. Rt. arm  Injection Date:08/23/2021 12:30     Injection Date:08/23/2021 08:20  Scan Date:08/23/2021 13:00          Scan Date:08/23/2021 08:50   Technique:         Gated            Technique:          SPECT                     SPECT  Imaging Results Visualization: Good  Rest ejection  Ejection fraction:55 %  EDV :87 ml  ESV :39 ml  Stroke volume :48 ml  Medical History   Accession#:  [id number]  Admission Data Admission date: 08/23/2021 Admission Time: 07:54 Hospital Status:

## 2021-09-10 NOTE — PROGRESS NOTES
Care assumed from anesthesia. Pt placed on ventilator at ordered settings. Will continue to monitor and wean as tolerated per open heart weaning protocol.

## 2021-09-10 NOTE — PROGRESS NOTES
PH 7.41 pCO2 33 pO2 158 HCO 21 BE -2.5 after 30 minutes on SPONT; Dr. Mary Bullock updated and OK to proceed with extubation. RT Gabby called.

## 2021-09-10 NOTE — PROGRESS NOTES
Pt developed hives like rash on flank and back area. Pruritic and red. States his hands are becoming itchy as well. Dr. Shahbaz Rolon updated, orders for benadryl PO and hydrocortisone cream to be applied, see MAR.

## 2021-09-10 NOTE — PROGRESS NOTES
RT/RN at bedside, pt following commands, able to lift head off pillow, hemodynamically stable, pt on CPAP with minimal ventilator settings, ABGs WNL, nif -22. Pt meets all criteria for RN/RT driven ventilator weaning protocol to extubate. Pt extubated at this time per Kimberley RT. placed on 3 L O2 via nasal cannula, Pt tolerated well, breathing treatment given per RT.

## 2021-09-10 NOTE — PROGRESS NOTES
Pt arrive to CVICU from OR with OR RN and anesthesia, pt connected to CVICU monitor's. Assessment completed, see documentation. Pt with epi and insulin gtt infusing, see MAR. Pt with ventricular wires in place isolated and taped, not connected to pacer at this time. Pacer at bedside. Pt intubated, RT at bedside and placed pt on ventilator. Will continue to monitor very closely.     Electronically signed by Lolis Bauman RN on 9/10/2021 at 12:43 PM

## 2021-09-10 NOTE — PROGRESS NOTES
Dr. Polo leahy at bedside, updated on ABGs, gtts, and hemodynamics. Also updated on amount of albumin that has been given. No new orders at this time.

## 2021-09-10 NOTE — PROGRESS NOTES
Patient able to follow commands, RSBI 37, NIF -22, good ABGs on CPAP/Spontaneous. Pt extubated at 1508 per protocol with RN at bedside. Placed on 3L nasal cannula.

## 2021-09-10 NOTE — PROGRESS NOTES
RT Kimberley at bedside, pt changed to [facility] on ventilator at this time. Pt able to follow commands.

## 2021-09-10 NOTE — PROGRESS NOTES
Placed patient in CPAP/Spontaneous mode on ventilator with PS 10 PEEP 5 and fiO2 40%. Will continue to monitor closely and wean as tolerated.

## 2021-09-10 NOTE — H&P
Patient was seen in pre-op. I have reviewed the history and physical.  I have examined the patient today. There are no changes noted from the H & P available in chart. The patient was counseled at length about the risks of jai Covid-19 during their perioperative period and any recovery window from their procedure. The patient was made aware that jai Covid-19  may worsen their prognosis for recovering from their procedure  and lend to a higher morbidity and/or mortality risk. All material risks, benefits, and reasonable alternatives including postponing the procedure were discussed. The patient does wish to proceed with the procedure at this time.

## 2021-09-10 NOTE — BRIEF OP NOTE
Brief Postoperative Note      Patient: Ivett Torrez  YOB: 1945  MRN: [id number]    Date of Procedure: 9/10/2021    Pre-Op Diagnosis: CAD    Post-Op Diagnosis: Same       Procedure(s):  CABG CORONARY ARTERY BYPASS x 4  LIMA to LAD and diag  SVG to OM  SVG to distal RCA    Surgeon(s):  Raúl Santos MD    Assistant:  Physician Assistant: Susan Estrada PA-C    Anesthesia: General    Estimated Blood Loss (mL): 797     Complications: None    Specimens:   * No specimens in log *    Implants:  Implant Name Type Inv.  Item Serial No.  Lot No. LRB No. Used Action   ZINACTIVE USE [id number] LEAD PACE [id number] CHN A OR V MYOCARDIAL STEROID ELUT SANDEEP  ZINACTIVE USE [id number] LEAD PACE [id number] CHN A OR V MYOCARDIAL STEROID ELUT SANDEEP  [employer] CARDIAC SURGERY-  N/A 1 Implanted         Drains:   Chest Tube Anterior 24 [place] (Active)       Chest Tube 1 Anterior 24 [place] (Active)       Chest Tube 3 Anterior 32 [place] (Active)       Closed/Suction Drain Inferior;Left;Proximal;Anterior Leg Bulb 10 [place] (Active)       Urethral Catheter Non-latex 16 fr (Active)       Findings:     Electronically signed by Susan Estrada PA-C on 9/10/2021 at 11:06 AM

## 2021-09-11 ENCOUNTER — APPOINTMENT (OUTPATIENT)
Dept: GENERAL RADIOLOGY | Age: 76
DRG: 234 | End: 2021-09-11
Attending: THORACIC SURGERY (CARDIOTHORACIC VASCULAR SURGERY)
Payer: MEDICARE

## 2021-09-11 LAB
ANION GAP SERPL CALCULATED.3IONS-SCNC: 11 MMOL/L (ref 4–16)
BUN BLDV-MCNC: 13 MG/DL (ref 6–23)
CALCIUM IONIZED: 4.44 MG/DL (ref 4.48–5.28)
CALCIUM SERPL-MCNC: 7.8 MG/DL (ref 8.3–10.6)
CHLORIDE BLD-SCNC: 107 MMOL/L (ref 99–110)
CO2: 21 MMOL/L (ref 21–32)
CREAT SERPL-MCNC: 0.8 MG/DL (ref 0.9–1.3)
CULTURE: NORMAL
GFR AFRICAN AMERICAN: >60 ML/MIN/1.73M2
GFR NON-AFRICAN AMERICAN: >60 ML/MIN/1.73M2
GLUCOSE BLD-MCNC: 104 MG/DL (ref 70–99)
GLUCOSE BLD-MCNC: 106 MG/DL (ref 70–99)
GLUCOSE BLD-MCNC: 106 MG/DL (ref 70–99)
GLUCOSE BLD-MCNC: 108 MG/DL (ref 70–99)
GLUCOSE BLD-MCNC: 116 MG/DL (ref 70–99)
GLUCOSE BLD-MCNC: 119 MG/DL (ref 70–99)
GLUCOSE BLD-MCNC: 127 MG/DL (ref 70–99)
GLUCOSE BLD-MCNC: 127 MG/DL (ref 70–99)
GLUCOSE BLD-MCNC: 132 MG/DL (ref 70–99)
GLUCOSE BLD-MCNC: 134 MG/DL (ref 70–99)
GLUCOSE BLD-MCNC: 135 MG/DL (ref 70–99)
GLUCOSE BLD-MCNC: 137 MG/DL (ref 70–99)
GLUCOSE BLD-MCNC: 160 MG/DL (ref 70–99)
GLUCOSE BLD-MCNC: 164 MG/DL (ref 70–99)
GLUCOSE BLD-MCNC: 167 MG/DL (ref 70–99)
GLUCOSE BLD-MCNC: 174 MG/DL (ref 70–99)
GLUCOSE BLD-MCNC: 192 MG/DL (ref 70–99)
GLUCOSE BLD-MCNC: 78 MG/DL (ref 70–99)
GLUCOSE BLD-MCNC: 83 MG/DL (ref 70–99)
GLUCOSE BLD-MCNC: 84 MG/DL (ref 70–99)
GLUCOSE BLD-MCNC: 94 MG/DL (ref 70–99)
GLUCOSE BLD-MCNC: 95 MG/DL (ref 70–99)
HCT VFR BLD CALC: 26.6 % (ref 42–52)
HEMOGLOBIN: 8.7 GM/DL (ref 13.5–18)
IONIZED CA: 1.11 MMOL/L (ref 1.12–1.32)
Lab: NORMAL
MAGNESIUM: 1.7 MG/DL (ref 1.8–2.4)
MCH RBC QN AUTO: 30.5 PG (ref 27–31)
MCHC RBC AUTO-ENTMCNC: 32.7 % (ref 32–36)
MCV RBC AUTO: 93.3 FL (ref 78–100)
PDW BLD-RTO: 13.3 % (ref 11.7–14.9)
PLATELET # BLD: 94 K/CU MM (ref 140–440)
PMV BLD AUTO: 11.2 FL (ref 7.5–11.1)
POTASSIUM SERPL-SCNC: 4.4 MMOL/L (ref 3.5–5.1)
POTASSIUM SERPL-SCNC: 4.6 MMOL/L (ref 3.5–5.1)
RBC # BLD: 2.85 M/CU MM (ref 4.6–6.2)
SODIUM BLD-SCNC: 139 MMOL/L (ref 135–145)
SPECIMEN: NORMAL
WBC # BLD: 8.9 K/CU MM (ref 4–10.5)

## 2021-09-11 PROCEDURE — 71045 X-RAY EXAM CHEST 1 VIEW: CPT

## 2021-09-11 PROCEDURE — 6370000000 HC RX 637 (ALT 250 FOR IP): Performed by: PHYSICIAN ASSISTANT

## 2021-09-11 PROCEDURE — 97530 THERAPEUTIC ACTIVITIES: CPT

## 2021-09-11 PROCEDURE — 6360000002 HC RX W HCPCS: Performed by: PHYSICIAN ASSISTANT

## 2021-09-11 PROCEDURE — 82962 GLUCOSE BLOOD TEST: CPT

## 2021-09-11 PROCEDURE — 84132 ASSAY OF SERUM POTASSIUM: CPT

## 2021-09-11 PROCEDURE — 94640 AIRWAY INHALATION TREATMENT: CPT

## 2021-09-11 PROCEDURE — 83735 ASSAY OF MAGNESIUM: CPT

## 2021-09-11 PROCEDURE — 2580000003 HC RX 258: Performed by: PHYSICIAN ASSISTANT

## 2021-09-11 PROCEDURE — 6360000002 HC RX W HCPCS: Performed by: THORACIC SURGERY (CARDIOTHORACIC VASCULAR SURGERY)

## 2021-09-11 PROCEDURE — 82330 ASSAY OF CALCIUM: CPT

## 2021-09-11 PROCEDURE — 97162 PT EVAL MOD COMPLEX 30 MIN: CPT

## 2021-09-11 PROCEDURE — 80048 BASIC METABOLIC PNL TOTAL CA: CPT

## 2021-09-11 PROCEDURE — 85027 COMPLETE CBC AUTOMATED: CPT

## 2021-09-11 PROCEDURE — P9045 ALBUMIN (HUMAN), 5%, 250 ML: HCPCS | Performed by: PHYSICIAN ASSISTANT

## 2021-09-11 PROCEDURE — 97116 GAIT TRAINING THERAPY: CPT

## 2021-09-11 PROCEDURE — 2000000000 HC ICU R&B

## 2021-09-11 PROCEDURE — 97166 OT EVAL MOD COMPLEX 45 MIN: CPT

## 2021-09-11 PROCEDURE — 6360000002 HC RX W HCPCS

## 2021-09-11 PROCEDURE — 94761 N-INVAS EAR/PLS OXIMETRY MLT: CPT

## 2021-09-11 PROCEDURE — P9045 ALBUMIN (HUMAN), 5%, 250 ML: HCPCS

## 2021-09-11 RX ORDER — FUROSEMIDE 10 MG/ML
20 INJECTION INTRAMUSCULAR; INTRAVENOUS 2 TIMES DAILY
Status: DISCONTINUED | OUTPATIENT
Start: 2021-09-11 | End: 2021-09-13

## 2021-09-11 RX ADMIN — HYDROCODONE BITARTRATE AND ACETAMINOPHEN 2 TABLET: 5; 325 TABLET ORAL at 02:48

## 2021-09-11 RX ADMIN — SENNOSIDES AND DOCUSATE SODIUM 1 TABLET: 50; 8.6 TABLET ORAL at 09:02

## 2021-09-11 RX ADMIN — INSULIN HUMAN 1.6 UNITS/HR: 1 INJECTION, SOLUTION INTRAVENOUS at 09:33

## 2021-09-11 RX ADMIN — ALBUTEROL SULFATE 2 PUFF: 90 AEROSOL, METERED RESPIRATORY (INHALATION) at 16:12

## 2021-09-11 RX ADMIN — ALBUTEROL SULFATE 2 PUFF: 90 AEROSOL, METERED RESPIRATORY (INHALATION) at 07:17

## 2021-09-11 RX ADMIN — FUROSEMIDE 20 MG: 10 INJECTION, SOLUTION INTRAVENOUS at 21:58

## 2021-09-11 RX ADMIN — TAMSULOSIN HYDROCHLORIDE 0.8 MG: 0.4 CAPSULE ORAL at 09:03

## 2021-09-11 RX ADMIN — Medication 2 PUFF: at 16:13

## 2021-09-11 RX ADMIN — CEFAZOLIN SODIUM 2000 MG: 2 INJECTION, SOLUTION INTRAVENOUS at 23:56

## 2021-09-11 RX ADMIN — CARVEDILOL 3.12 MG: 3.12 TABLET, FILM COATED ORAL at 09:03

## 2021-09-11 RX ADMIN — ALBUMIN (HUMAN) 12.5 G: 12.5 INJECTION, SOLUTION INTRAVENOUS at 15:10

## 2021-09-11 RX ADMIN — Medication: at 19:52

## 2021-09-11 RX ADMIN — SODIUM CHLORIDE, PRESERVATIVE FREE 10 ML: 5 INJECTION INTRAVENOUS at 19:53

## 2021-09-11 RX ADMIN — AMIODARONE HYDROCHLORIDE 200 MG: 200 TABLET ORAL at 13:53

## 2021-09-11 RX ADMIN — Medication 2 PUFF: at 07:16

## 2021-09-11 RX ADMIN — CARVEDILOL 3.12 MG: 3.12 TABLET, FILM COATED ORAL at 19:53

## 2021-09-11 RX ADMIN — AMIODARONE HYDROCHLORIDE 200 MG: 200 TABLET ORAL at 09:03

## 2021-09-11 RX ADMIN — Medication 2 PUFF: at 11:53

## 2021-09-11 RX ADMIN — SODIUM CHLORIDE, PRESERVATIVE FREE 10 ML: 5 INJECTION INTRAVENOUS at 09:12

## 2021-09-11 RX ADMIN — CEFAZOLIN SODIUM 2000 MG: 2 INJECTION, SOLUTION INTRAVENOUS at 15:17

## 2021-09-11 RX ADMIN — THERA TABS 1 TABLET: TAB at 09:02

## 2021-09-11 RX ADMIN — SODIUM CHLORIDE: 4.5 INJECTION, SOLUTION INTRAVENOUS at 01:30

## 2021-09-11 RX ADMIN — FUROSEMIDE 20 MG: 10 INJECTION, SOLUTION INTRAVENOUS at 09:42

## 2021-09-11 RX ADMIN — KETOROLAC TROMETHAMINE 15 MG: 30 INJECTION, SOLUTION INTRAMUSCULAR; INTRAVENOUS at 17:34

## 2021-09-11 RX ADMIN — AMIODARONE HYDROCHLORIDE 200 MG: 200 TABLET ORAL at 19:53

## 2021-09-11 RX ADMIN — ALBUTEROL SULFATE 2 PUFF: 90 AEROSOL, METERED RESPIRATORY (INHALATION) at 11:53

## 2021-09-11 RX ADMIN — ALBUTEROL SULFATE 2 PUFF: 90 AEROSOL, METERED RESPIRATORY (INHALATION) at 21:25

## 2021-09-11 RX ADMIN — HYDROCODONE BITARTRATE AND ACETAMINOPHEN 2 TABLET: 5; 325 TABLET ORAL at 12:44

## 2021-09-11 RX ADMIN — ALBUMIN (HUMAN) 12.5 G: 12.5 INJECTION, SOLUTION INTRAVENOUS at 00:45

## 2021-09-11 RX ADMIN — ALBUMIN (HUMAN) 12.5 G: 12.5 INJECTION, SOLUTION INTRAVENOUS at 03:31

## 2021-09-11 RX ADMIN — CEFAZOLIN SODIUM 2000 MG: 2 INJECTION, SOLUTION INTRAVENOUS at 07:28

## 2021-09-11 RX ADMIN — HYDROCODONE BITARTRATE AND ACETAMINOPHEN 2 TABLET: 5; 325 TABLET ORAL at 07:57

## 2021-09-11 RX ADMIN — HYDROCODONE BITARTRATE AND ACETAMINOPHEN 2 TABLET: 5; 325 TABLET ORAL at 19:53

## 2021-09-11 RX ADMIN — Medication 2 PUFF: at 21:26

## 2021-09-11 RX ADMIN — ATORVASTATIN CALCIUM 20 MG: 20 TABLET, FILM COATED ORAL at 19:53

## 2021-09-11 RX ADMIN — PANTOPRAZOLE SODIUM 40 MG: 40 TABLET, DELAYED RELEASE ORAL at 09:02

## 2021-09-11 RX ADMIN — Medication: at 09:13

## 2021-09-11 RX ADMIN — CALCIUM GLUCONATE 2000 MG: 20 INJECTION, SOLUTION INTRAVENOUS at 10:26

## 2021-09-11 RX ADMIN — MAGNESIUM SULFATE HEPTAHYDRATE 2000 MG: 40 INJECTION, SOLUTION INTRAVENOUS at 08:40

## 2021-09-11 RX ADMIN — ASPIRIN 81 MG: 81 TABLET, COATED ORAL at 09:03

## 2021-09-11 ASSESSMENT — PAIN DESCRIPTION - DESCRIPTORS
DESCRIPTORS: DULL
DESCRIPTORS: ACHING
DESCRIPTORS: DULL
DESCRIPTORS: ACHING;DISCOMFORT
DESCRIPTORS: ACHING

## 2021-09-11 ASSESSMENT — PAIN DESCRIPTION - LOCATION
LOCATION: CHEST
LOCATION: CHEST;BACK
LOCATION: CHEST

## 2021-09-11 ASSESSMENT — PAIN DESCRIPTION - PROGRESSION
CLINICAL_PROGRESSION: NOT CHANGED
CLINICAL_PROGRESSION: NOT CHANGED
CLINICAL_PROGRESSION: GRADUALLY WORSENING
CLINICAL_PROGRESSION: GRADUALLY IMPROVING
CLINICAL_PROGRESSION: GRADUALLY WORSENING
CLINICAL_PROGRESSION: NOT CHANGED
CLINICAL_PROGRESSION: GRADUALLY WORSENING
CLINICAL_PROGRESSION: NOT CHANGED
CLINICAL_PROGRESSION: NOT CHANGED

## 2021-09-11 ASSESSMENT — PAIN SCALES - GENERAL
PAINLEVEL_OUTOF10: 0
PAINLEVEL_OUTOF10: 6
PAINLEVEL_OUTOF10: 6
PAINLEVEL_OUTOF10: 7
PAINLEVEL_OUTOF10: 7
PAINLEVEL_OUTOF10: 9
PAINLEVEL_OUTOF10: 3
PAINLEVEL_OUTOF10: 3
PAINLEVEL_OUTOF10: 7
PAINLEVEL_OUTOF10: 3
PAINLEVEL_OUTOF10: 8
PAINLEVEL_OUTOF10: 0
PAINLEVEL_OUTOF10: 0

## 2021-09-11 ASSESSMENT — PAIN - FUNCTIONAL ASSESSMENT
PAIN_FUNCTIONAL_ASSESSMENT: PREVENTS OR INTERFERES SOME ACTIVE ACTIVITIES AND ADLS
PAIN_FUNCTIONAL_ASSESSMENT: ACTIVITIES ARE NOT PREVENTED
PAIN_FUNCTIONAL_ASSESSMENT: PREVENTS OR INTERFERES SOME ACTIVE ACTIVITIES AND ADLS

## 2021-09-11 ASSESSMENT — PAIN DESCRIPTION - PAIN TYPE
TYPE: SURGICAL PAIN

## 2021-09-11 ASSESSMENT — PAIN DESCRIPTION - FREQUENCY
FREQUENCY: INTERMITTENT
FREQUENCY: CONTINUOUS
FREQUENCY: CONTINUOUS
FREQUENCY: INTERMITTENT
FREQUENCY: CONTINUOUS
FREQUENCY: CONTINUOUS

## 2021-09-11 ASSESSMENT — PAIN DESCRIPTION - ORIENTATION
ORIENTATION: MID

## 2021-09-11 ASSESSMENT — PAIN DESCRIPTION - ONSET
ONSET: ON-GOING

## 2021-09-11 ASSESSMENT — PAIN DESCRIPTION - DIRECTION: RADIATING_TOWARDS: BACK

## 2021-09-11 NOTE — PROGRESS NOTES
Spoke with Dr. Brooklyn Alanis concerning low BP of 78/45 (55) and minimal urine output. Orders to give 250mL of albumin. Will monitor pt closely.

## 2021-09-11 NOTE — PROGRESS NOTES
Occupational Therapy  [facility] ACUTE CARE OCCUPATIONAL THERAPY EVALUATION    Ignacio Fajardo, 1945, 2129/2129-A, 9/11/2021    Discharge Recommendation:  Home with initial 24 hour supervision/assistance      History:  [place]:  There were no encounter diagnoses.     Subjective:  Patient states: \"I just have so much pain here in my back/Left side\"  Pain: Pt stated pain in back/left side (chest tube sites), did not rate pain   Communication with other providers: PT Terry, MALIHA Demarco and Uli Leach  Restrictions: General Precautions, Fall Risk, Chest tube x2, IV, Telemetry, BP cuff, Pulse Ox, Sylvester, YANN Drain L LE, Sternal Precautions     Home Setup/Prior level of function:  Social/Functional History  Lives With: Spouse  Type of Home: House  Home Layout: One level  Home Access: Stairs to enter with rails  Entrance Stairs - Number of Steps: 3  Entrance Stairs - Rails: Both  Bathroom Shower/Tub: Tub/Shower unit  Bathroom Toilet: Standard  Bathroom Equipment: Grab bars in shower  ADL Assistance: Independent  Homemaking Assistance: Independent  Homemaking Responsibilities: Yes  Ambulation Assistance: Independent (without AD)  Transfer Assistance: Independent  Active : Yes  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    Examination:  · Observation: Supine in bed upon arrival, RNs present at bedside, agreeable to OT eval/tx  · Vision: [facility] with glasses  · Hearing: WFL  · Vitals: Stable vitals throughout session    Body Systems and functions:  · ROM: BL UEs WFL, observed functionally d/t adherence to sternal precautions  · Strength: 4+/5 BL UEs observed functionally d/t pain/adherence to sternal precautions    · Sensation: WFL  · Tone: Normal  · Coordination: WFL  · Perception: WNL    Activities of Daily Living (ADLs):  · Feeding: Independent   · Grooming: SBA  · UB bathing: SBA, seated for safety   · LB bathing: CGA, seated for safety/pain   · UB dressing: Min A, anticipate assist with threading Treatment:  Therapeutic Activity Training x2:   Therapeutic activity training was instructed today. Cues were given for safety, sequence, UE/LE placement, awareness, and balance. Activities performed today included bed mobility training, sup-sit, sit-stand, SPT, education on sternal precautions and adherence to during mobility/ADLs. Safety Measures: Gait belt used, Left in bed, Alarm in place, needs in reach   *Pt adamantly refusing to sit in chair at end of session d/t pain in L side/back     Assessment:  Pt is a 78year old male with a past medical history of Abnormal Heart Score CT, Arthritis, CAD (coronary artery disease), Cancer ([facility] [address] Diabetes mellitus ([facility] [address] Elevated d-dimer, Fatigue, H/O echocardiogram, History of exercise stress test, History of nuclear stress test, Hyperlipidemia, Hypertension, Obesity, and Psoriasis. Pt admitted for and s/p CABG x4 on 9/10. Pt is from home where he lives in a 1story house with his wife. Pt typically independent in all ADLs, high level IADLs, driving and manages rental properties. Pt's wife is in good health and plans to be home with him at all times upon d/c. Pt is safe from a self care and mobility stand point to return home without therapy needs and initial 24/7 supervision/assistance. Pt would benefit from continued acute care OT services to address adaptive techniques for ADLs and adherence to sternal precautions. Complexity: Moderate  Prognosis: Good  Plan: 3x/week      Goals:  1. Pt will complete all aspects of bed mobility for EOB/OOB ADLs SBA with good adherence to sternal precautions   2. Pt will complete UB/LB bathing SBA, seated  3. Pt will complete all aspects of LB dressing Min A   4. Pt will complete all functional transfers to and from bed, chair, toilet, shower chair SBA with LRAD   5. Pt will ambulate HH distance to bathroom for toileting SBA with LRAD  6. Pt will complete all aspects of toileting task SBA  7.  Pt will complete oral hygiene/grooming routine in standing at sink SBA  8.  Pt will complete ther ex/ther act with focus on cardiac rehab exercises, adherence to sternal precautions with mobility and ADLs, adaptive techniques for ADLs prn         Time:   Time in: 900  Time out: 946  Timed treatment minutes: 31  Total time: 46      Electronically signed by:      LESLIE Tobias/L, [id number] [facility] REINALDO [phone]

## 2021-09-11 NOTE — PROGRESS NOTES
Physical Therapy    Facility/Department: [facility] ICU  Initial Assessment    NAME: Tahir Aguirre  : 1945  MRN: [id number]    Date of Service: 2021    Discharge Recommendations:   (home w/ initial sup)   PT Equipment Recommendations  Equipment Needed: No    Assessment   Body structures, Functions, Activity limitations: Decreased functional mobility ; Decreased ADL status; Decreased ROM; Decreased strength;Decreased balance; Increased pain;Decreased endurance;Decreased posture  Assessment: Pt presents 1 day s/p admission for CABG. Pt presents from home where he lives w/ his wife, endorses in PLOF, in a house w/ 3 steps to enter. Medical hx includes HTN, HLD, CAD, DM, artritis, and hx of cancer. Pt presents w/ the above listed deficits, however, he is primarily limited by inc pain and dec endurance at this time. Pt continuously complains of L sided back pain t/o tx session, provided education on etiology, healing timeline, positioning for max comfort. Pt requires mod vc/tc to maintain sternal precautions, he is however, able to verabilize his understanding and list precautions for reinforced carryover. Pt becomes intermittently agitated w/ the request he stay in the chair until at least lunch, expressing his discomfort repeatedly; pt is repositioned and offered additional options, however, he demands to return to bed at this time. Pt will cont to benefit from cont skilled acute care therapy prior to return home. Recommending home w/ initial sup/assist from wife PRN 2/2 good functional mobility and anticipated improvement in overall presentation. Prognosis: Good  Decision Making: Medium Complexity  PT Education: Goals;PT Role;Plan of Care;Transfer Training;Energy Conservation;General Safety;Precautions; Family Education;Gait Training;Disease Specific Education; Functional Mobility Training; Adaptive Device Training  REQUIRES PT FOLLOW UP: Yes  Activity Tolerance  Activity Tolerance: Patient limited by endurance; Patient limited by pain       Treatment:  Therapeutic Activity Training:   Therapeutic activity training was instructed today. Cues were given for safety, sequence, UE/LE placement, awareness, and balance. Activities performed today included bed mobility training, sup-sit, sit-stand, SPT. 20 mins for transitions, transfer prep, initiation of balance challenges, clothing mgmt, education, reinforcement of sternal precautions, and additional family education. Gait Training:  Cues were given for safety, sequence, device management, balance, posture, awareness, path. Patient Diagnosis(es): There were no encounter diagnoses. has a past medical history of Abnormal Heart Score CT, Arthritis, CAD (coronary artery disease), Cancer ([facility] [address] Diabetes mellitus ([facility] [address] Elevated d-dimer, Fatigue, H/O echocardiogram, History of exercise stress test, History of nuclear stress test, Hyperlipidemia, Hypertension, Obesity, and Psoriasis. has a past surgical history that includes fracture surgery (Right, 1969); fracture surgery (Left, 1994); fracture surgery (2011); Cholecystectomy (1999); hernia repair (1999); cyst incision and drainage (Right, 2013); Colonoscopy (10/22/2003); Colonoscopy (2010); Colonoscopy (12/05/2017); and Bladder surgery. Restrictions  Restrictions/Precautions  Restrictions/Precautions: Fall Risk, General Precautions, Cardiac    Subjective  General  Chart Reviewed: Yes  Patient assessed for rehabilitation services?: Yes  Family / Caregiver Present: Yes (wife)  Follows Commands: Within Functional Limits  Subjective  Subjective: I sat up in the chair all day yesterday. . I Emaline Cheese go to bed.   Pain Screening  Patient Currently in Pain: Yes (pt does not rate his pain)  Pain Assessment  Pain Location: Chest;Back  Vital Signs  Patient Currently in Pain: Yes (pt does not rate his pain)       Orientation  Orientation  Overall Orientation Status: Within Functional Limits  Social/Functional

## 2021-09-11 NOTE — PLAN OF CARE
Problem: Pain:  Goal: Pain level will decrease  Description: Pain level will decrease  9/11/2021 0135 by Johnny Dhaliwal RN  Outcome: Ongoing  9/11/2021 0134 by Johnny Dhaliwal RN  Outcome: Ongoing  Goal: Control of acute pain  Description: Control of acute pain  9/11/2021 0135 by Johnny Dhaliwal RN  Outcome: Ongoing  9/11/2021 0134 by Johnny Dhaliwal RN  Outcome: Ongoing  Goal: Control of chronic pain  Description: Control of chronic pain  9/11/2021 0135 by Johnny Dhaliwal RN  Outcome: Ongoing  9/11/2021 0134 by Johnny Dhaliwal RN  Outcome: Ongoing     Problem: Skin Integrity:  Goal: Will show no infection signs and symptoms  Description: Will show no infection signs and symptoms  9/11/2021 0135 by Johnny Dhaliwal RN  Outcome: Ongoing  9/11/2021 0134 by Johnny Dhaliwal RN  Outcome: Ongoing  Goal: Absence of new skin breakdown  Description: Absence of new skin breakdown  9/11/2021 0135 by Johnny Dhaliwal RN  Outcome: Ongoing  9/11/2021 0134 by Johnny Dhaliwal RN  Outcome: Ongoing     Problem: Discharge Planning:  Goal: Discharged to appropriate level of care  Description: Discharged to appropriate level of care  9/11/2021 0135 by Johnny Dhaliwal RN  Outcome: Ongoing  9/11/2021 0134 by Johnny Dhaliwal RN  Outcome: Ongoing     Problem:  Activity Intolerance:  Goal: Able to perform prescribed physical activity  Description: Able to perform prescribed physical activity  9/11/2021 0135 by Johnny Dhaliwal RN  Outcome: Ongoing  9/11/2021 0134 by Johnny Dhaliwal RN  Outcome: Ongoing  Goal: Ability to tolerate increased activity will improve  Description: Ability to tolerate increased activity will improve  9/11/2021 0135 by Johnny Dhaliwal RN  Outcome: Ongoing  9/11/2021 0134 by Johnny Dhaliwal RN  Outcome: Ongoing     Problem: Anxiety:  Goal: Level of anxiety will decrease  Description: Level of anxiety will decrease  9/11/2021 0135 by Carla Quarles RN  Outcome: Ongoing  9/11/2021 0134 by Carla Quarles RN  Outcome: Ongoing     Problem: Cardiac Output - Decreased:  Goal: Cardiac output within specified parameters  Description: Cardiac output within specified parameters  9/11/2021 0135 by Carla Quarles RN  Outcome: Ongoing  9/11/2021 0134 by Carla Quarles RN  Outcome: Ongoing  Goal: Hemodynamic stability will improve  Description: Hemodynamic stability will improve  9/11/2021 0135 by Carla Quarles RN  Outcome: Ongoing  9/11/2021 0134 by Carla Quarles RN  Outcome: Ongoing     Problem: Fluid Volume - Imbalance:  Goal: Ability to achieve a balanced intake and output will improve  Description: Ability to achieve a balanced intake and output will improve  9/11/2021 0135 by Carla Quarles RN  Outcome: Ongoing  9/11/2021 0134 by Carla Quarles RN  Outcome: Ongoing  Goal: Chest tube drainage is within specified parameters  Description: Chest tube drainage is within specified parameters  9/11/2021 0135 by Carla Quarles RN  Outcome: Ongoing  9/11/2021 0134 by Carla Quarles RN  Outcome: Ongoing     Problem: Gas Exchange - Impaired:  Goal: Levels of oxygenation will improve  Description: Levels of oxygenation will improve  9/11/2021 0135 by Carla Quarles RN  Outcome: Ongoing  9/11/2021 0134 by Carla Quarles RN  Outcome: Ongoing  Goal: Ability to maintain adequate ventilation will improve  Description: Ability to maintain adequate ventilation will improve  9/11/2021 0135 by Carla Quarles RN  Outcome: Ongoing  9/11/2021 0134 by Carla Quarles RN  Outcome: Ongoing     Problem: Pain:  Goal: Pain level will decrease  Description: Pain level will decrease  9/11/2021 0135 by Carla Quarles RN  Outcome: Ongoing  9/11/2021 0134 by Carla Quarles RN  Outcome: Ongoing  Goal: Control of acute pain  Description: Control of acute pain  9/11/2021 0135 by Diamond Brown RN  Outcome: Ongoing  9/11/2021 0134 by Diamond Brown RN  Outcome: Ongoing  Goal: Control of chronic pain  Description: Control of chronic pain  9/11/2021 0135 by Diamond Brown RN  Outcome: Ongoing  9/11/2021 0134 by Diamond Brown RN  Outcome: Ongoing     Problem: Tissue Perfusion - Cardiopulmonary, Altered:  Goal: Hemodynamic stability will improve  Description: Hemodynamic stability will improve  9/11/2021 0135 by Diamond Brown RN  Outcome: Ongoing  9/11/2021 0134 by Diamond Brown RN  Outcome: Ongoing  Goal: Absence of angina  Description: Absence of angina  9/11/2021 0135 by Diamond Brown RN  Outcome: Ongoing  9/11/2021 0134 by Diamond Brown RN  Outcome: Ongoing  Goal: Hemodynamic stability will improve  Description: Hemodynamic stability will improve  9/11/2021 0135 by Diamond Brown RN  Outcome: Ongoing  9/11/2021 0134 by Diamond Brown RN  Outcome: Ongoing  Goal: Will show no evidence of cardiac arrhythmias  Description: Will show no evidence of cardiac arrhythmias  9/11/2021 0135 by Diamond Brown RN  Outcome: Ongoing  9/11/2021 0134 by Diamond Brown RN  Outcome: Ongoing     Problem: Tobacco Use:  Goal: Will participate in inpatient tobacco-use cessation counseling  Description: Will participate in inpatient tobacco-use cessation counseling  9/11/2021 0135 by Diamond Brown RN  Outcome: Ongoing  9/11/2021 0134 by Diamond Brown RN  Outcome: Ongoing

## 2021-09-11 NOTE — PROGRESS NOTES
Pt educated on removal of chest tubes, deep breathing prior to removing chest tube. Pt verbalizes understanding. Chest tube removed per order per sterile procedure, retention sutures tightened and trimmed post removal, occlusive dressing placed, pt tolerated very well. Pt verbalizes understanding, will continue to monitor closely.    Electronically signed by Segundo Carlton RN on 9/11/2021 at 12:29 PM

## 2021-09-11 NOTE — PROGRESS NOTES
PATIENT NAME: Siri Salas    TODAY'S DATE: 09/11/21    Reason for visit: Status post coronary bypass grafting    SUBJECTIVE:    Pt has no complaints today. He feels fairly well though does state he feels a little groggy. Patient had a rash overnight but this is mostly resolved. He was given Benadryl and Pepcid. OBJECTIVE:   VITALS:    Vitals:    09/11/21 0900   BP: 106/73   Pulse: 88   Resp: 19   Temp:    SpO2: 99%     INTAKE/OUTPUT:    Date 09/11/21 0000 - 09/11/21 2359   Shift 8105-9646 3775-4835 8822-5057 24 Hour Total   INTAKE   Shift Total(mL/kg)       OUTPUT   Urine(mL/kg/hr) 454(0.6) 115  569   Chest Tube 190 70  260   Shift Total(mL/kg) 644(7.3) 185(2.1)  829(9.4)   Weight (kg) 88 88 88 88        EXAM:  Blood pressure 106/73, pulse 88, temperature 98.6 °F (37 °C), temperature source Core, resp. rate 19, height 5' 8\" (1.727 m), weight 194 lb 0.1 oz (88 kg), SpO2 99 %. General appearance: No apparent distress, appears stated age and cooperative. Skin: unremarkable  HEENT Normocephalic, atraumatic without obvious deformity. Neck: Supple, Trachea midline   Lungs: Good respiratory effort. Clear to auscultation, bilaterally  Heart: Regular rate/ rhythm sternum stable inc c/d/i  Abdomen: Soft, non-tender or non-distended   Extremities: 1+ right lower extremity edema, left lower extremity is wrapped. The drain has bloody output and this was stripped multiple times. Neurologic: Alert, grossly intact  Mental status: normal affect  Chest tubes with serosanguineous output and no air leak    Data:  CBC:   Recent Labs     09/09/21  1000 09/10/21  0750 09/10/21  1150 09/10/21  1251 09/10/21  1808 09/10/21  1858 09/11/21  0500   WBC 6.1  --  21.2*  --   --   --  8.9   HGB 11.8*   < > 10.6*   < > 9.3* 8.9* 8.7*   HCT 36.2*   < > 31.7*   < > 27.0* 26.0* 26.6*   *  --  168  --   --   --  94*    < > = values in this interval not displayed.      BMP:    Recent Labs     09/09/21  1000 09/10/21  0750 09/10/21  1150 09/10/21  1251 09/10/21  1808 09/10/21  1808 09/10/21  1858 09/11/21  0005 09/11/21  0500      < > 142   < > 141  --  140  --  139   K 4.2   < > 3.7   < > 4.4   < > 4.4 4.6 4.4     --  111*  --   --   --   --   --  107   CO2 27   < > 22   < > 25  --  22  --  21   BUN 15  --  11  --   --   --   --   --  13   CREATININE 0.8*   < > 0.8*   < > 0.9  --  1.0  --  0.8*   GLUCOSE 242*  --  162*  --   --   --   --   --  84    < > = values in this interval not displayed. Hepatic: No results for input(s): AST, ALT, ALB, BILITOT, ALKPHOS in the last 72 hours. Mag:      Recent Labs     09/09/21  1000 09/10/21  1150 09/11/21  0500   MG 1.8 2.3 1.7*      Phos:   No results for input(s): PHOS in the last 72 hours. INR:   Recent Labs     09/09/21  1000 09/10/21  1150   INR 0.97 1.12     Radiology Review:  CXR pulmonary edema      ASSESSMENT AND PLAN:  S/P CABG x4    1 Day Post-Op  Patient Active Problem List   Diagnosis    Chest pain    Syncope    Diabetes mellitus ([facility] [address]    Hyperlipidemia    Hypertension    CAD in native artery       1. Cardio: Continue low-dose beta-blocker and p.o. amiodarone   Current drips: None, insulin drip  2. Pulm: Remove hard chest tube and pulmonary toilet  3. GI: P.o. diet  4. ID: Perioperative antibiotics  5. Heme: Hemoglobin drop is likely related to hemodilution. 6. Neuro: Neurologically intact with no issues  7. Renal: Start twice daily Lasix. Keep Sylvester today and removed tomorrow  8. Activity: PT/OT    Continue to strip the [place] drain through the day.   Remove the drain this afternoon/evening  Electronically signed by Angella Hughes MD on 9/11/2021 at 9:33 AM

## 2021-09-11 NOTE — OP NOTE
[address], [address] [facility]                                OPERATIVE REPORT    PATIENT NAME: Anila Renner               :        1945  MED REC NO:   [id number]                          ROOM:       2129  ACCOUNT NO:   [de-identified]                           ADMIT DATE: 09/10/2021  PROVIDER:     Shira Lion MD    DATE OF PROCEDURE:  09/10/2021    PRE-PROCEDURE DIAGNOSIS:  Multivessel coronary artery disease. POST-PROCEDURE DIAGNOSIS:  Multivessel coronary artery disease. PROCEDURES PERFORMED:  1. Coronary artery bypass grafting x4, left internal mammary artery  sequenced to diagonal branch artery and LAD. Reverse saphenous vein  graft anastomosed to obtuse marginal 1 artery, reverse saphenous vein  graft anastomosed to distal right coronary artery. 2.  Left [place]-Brielle introducer and central venous catheter placement. 3.  Ultrasound vein mapping of left greater saphenous vein. 4.  Endoscopic vein harvest of left greater saphenous vein. SURGEON:  Shira Lion MD    ASSISTANT:  Charisma Aguirre PA-C. Charisma Aguirre performed as an assistant  to perform endoscopic vein harvest exposure and assisting throughout the  entire procedure. ESTIMATED BLOOD LOSS:  Unable to be determined due to cardiopulmonary  bypass. PREOP:  This is a 81-year-old male who has had exertional angina for the  past year. This has worsened over the past month. He had a positive  stress test and cardiac catheterization showed multivessel coronary  artery disease. He has a history of diabetes, and he was referred for  surgical revascularization. The risks and benefits of surgery were  discussed in detail with the patient. The patient understood and agreed  to proceed. FINDINGS:  1. Left internal mammary artery was in excellent conduit with good  flow.   2.  Large-sized reverse saphenous vein was a larger vein but had some  aneurysmal portions to it. 3.  Left anterior descending artery was diffusely diseased and was  grafted in its distal portion. 4.  The diagonal branch artery with a smaller target was grafted in its  distal portion. 5.  Obtuse marginal 1 was diffusely calcified and was grafted more  distally. 6.  Distal obtuse marginal artery was a smaller target and diffusely  calcified and we opted not to graft this, we do not think the disease  was that critical.  7.  The distal PDA was dissected out. Distal to the significant lesion  on the PDA, the vessel was small, and we do not think that grafting this  region would be beneficial.  8.  The right coronary was diffusely calcified and we grafted in its  distal portion. 9.  Pre and post biventricular function was normal.    PROCEDURE:  The patient was identified, brought to the operating room  and laid in supine position. After successful induction of general  anesthesia, the patient was intubated by Anesthesia Staff, prepped and  draped in normal sterile fashion. Prior to incision, a timeout was  performed and antibiotics were given. We performed left B[place]-Brielle  introducer and central venous catheter using Seldinger technique. Ultrasound vein mapping of left greater saphenous vein was performed. A  median sternotomy was performed in a normal fashion. The pericardium  was opened from the innominate vein and teed off at the diaphragm. Left  internal mammary was harvested in normal fashion. At the same time,  left greater saphenous vein was endoscopically harvested. A drain was  placed in the left greater saphenous vein site. Prior to transection,  the patient was systemically heparinized. Pericardial cradle was  formed. When appropriate ACT was achieved, two concentric purse-strings  were placed in the distal ascending aorta. Aorta was cannulated using a  20-[place] Medtronic EOPA soft-flow cannula.   Right atrium was cannulated  using three-stage venous cannula. Root vent was placed in ascending  aorta. Aorta was dissected away from the pulmonary artery, and the  patient was placed in cardiopulmonary bypass. All distal targets were  evaluated. Cross-clamp was placed. Heart was arrested using antegrade  cardioplegia. After appropriate arrest, we dissected out our distal  targets. We first began by dissecting out the obtuse marginal 1 artery. It was entered using an 11-blade. Arteriotomy was extended proximally  and distally using micro Bruner scissors. End-to-side anastomosis was  created between reverse saphenous vein graft and obtuse marginal artery  using a 6-0 Prolene suture in a running fashion. At the completion of  the anastomosis, there was no significant bleeding. We then carried  this around the obtuse margin of the heart. A 4-mm punch was used to  create tight proximal anastomosis. End-to-side anastomosis was created  between the reverse saphenous vein graft and the ascending aorta using a  6-0 Prolene suture in a running fashion. At the completion of the  anastomosis, there was no significant bleeding. We first dissected out the posterior descending artery. There was a  good portion proximal to the stenosis in the posterior ascending artery  that was graftable. Distal to it, it became fairly small vessel and  really had a very small amount of territory that it supplied. At this  point, we decided to graft the distal right coronary artery. It was  entered using an 11-blade. Arteriotomy was extended proximally and  distally using micro Bruner scissors. End-to-side anastomosis was  created between reverse saphenous vein graft and the right coronary  artery using a 6-0 Prolene suture in a running fashion. After the  completion of anastomosis, there were no significant bleeding. We  carried this around the obtuse margin of the heart. A 4-mm punch was  used to create tight proximal anastomosis.   Of note, there was a large  amount of flow from this while giving antegrade cardioplegia which may  speak to the significance of the proximal right coronary lesion. A 4-mm  punch used to create tight proximal anastomosis. End-to-side  anastomosis was created between reverse saphenous vein graft and  ascending aorta using 6-0 Prolene suture in a running fashion. At the  completion of the anastomosis, there was no significant bleeding. We  then brought the internal mammary in the mediastinum. LAD and diagonal  branch artery was dissected out, entered using an 11-blade. Arteriotomy  was extended proximally and distally using micro Bruner scissors. We  created a side-to-side anastomosis with the left internal mammary artery  to the diagonal branch artery using a 7-0 Prolene suture in a running  fashion. After the completion of the anastomosis, there was no  significant bleeding. We then created end-to-side anastomosis between  the left internal mammary artery and left anterior descending artery  using a 7-0 Prolene suture in a running fashion. After completion of  anastomosis, there was no significant bleeding. We then tacked the  pedicle epicardium using a 6-0 Prolene suture. The patient was placed  in Trendelenburg position. Cross-clamp was removed. Heart was allowed  to perfuse. All distal targets were evaluated for bleeding. There is  no significant bleeding. Ventricular pacing wire was placed in the  inferior border of the heart. Lungs were allowed to ventilate and the  patient was weaned from cardiopulmonary bypass. After separation from  the cardiopulmonary bypass, protamine was given. Cannulas were removed. Cannulation sites were repaired using pledgeted Prolene suture. Bilateral pleural cavities and mediastinum were policed for bleeding. Once we were satisfied there was no significant bleeding, bilateral  pleural Blakes were placed. Pericardium was closed. A hard chest tube  was placed under sternum.   Sternum was reapproximated using sternal  wires. Presternal fascia was closed using 0 Vicryl sutures,  subcutaneous tissues were closed with 2-0 Vicryl suture and skin was  closed using 4-0 Monocryl in a subcuticular fashion. Sponge count,  needle count, and instrument count were correct. The patient tolerated  the procedure well and was transported to the CVICU in stable, but  critical condition.         Michelle Fields MD    D: 09/10/2021 15:49:00       T: 09/10/2021 15:55:19     [id number]  Job#: [id number]     Doc#: [id number]    CC:

## 2021-09-12 ENCOUNTER — APPOINTMENT (OUTPATIENT)
Dept: GENERAL RADIOLOGY | Age: 76
DRG: 234 | End: 2021-09-12
Attending: THORACIC SURGERY (CARDIOTHORACIC VASCULAR SURGERY)
Payer: MEDICARE

## 2021-09-12 LAB
ANION GAP SERPL CALCULATED.3IONS-SCNC: 6 MMOL/L (ref 4–16)
BUN BLDV-MCNC: 12 MG/DL (ref 6–23)
CALCIUM IONIZED: 4.76 MG/DL (ref 4.48–5.28)
CALCIUM SERPL-MCNC: 8.1 MG/DL (ref 8.3–10.6)
CHLORIDE BLD-SCNC: 103 MMOL/L (ref 99–110)
CO2: 26 MMOL/L (ref 21–32)
CREAT SERPL-MCNC: 0.8 MG/DL (ref 0.9–1.3)
GFR AFRICAN AMERICAN: >60 ML/MIN/1.73M2
GFR NON-AFRICAN AMERICAN: >60 ML/MIN/1.73M2
GLUCOSE BLD-MCNC: 110 MG/DL (ref 70–99)
GLUCOSE BLD-MCNC: 115 MG/DL (ref 70–99)
GLUCOSE BLD-MCNC: 117 MG/DL (ref 70–99)
GLUCOSE BLD-MCNC: 118 MG/DL (ref 70–99)
GLUCOSE BLD-MCNC: 119 MG/DL (ref 70–99)
GLUCOSE BLD-MCNC: 122 MG/DL (ref 70–99)
GLUCOSE BLD-MCNC: 137 MG/DL (ref 70–99)
GLUCOSE BLD-MCNC: 171 MG/DL (ref 70–99)
GLUCOSE BLD-MCNC: 172 MG/DL (ref 70–99)
GLUCOSE BLD-MCNC: 179 MG/DL (ref 70–99)
GLUCOSE BLD-MCNC: 180 MG/DL (ref 70–99)
GLUCOSE BLD-MCNC: 191 MG/DL (ref 70–99)
GLUCOSE BLD-MCNC: 193 MG/DL (ref 70–99)
GLUCOSE BLD-MCNC: 209 MG/DL (ref 70–99)
GLUCOSE BLD-MCNC: 214 MG/DL (ref 70–99)
GLUCOSE BLD-MCNC: 284 MG/DL (ref 70–99)
GLUCOSE BLD-MCNC: 300 MG/DL (ref 70–99)
GLUCOSE BLD-MCNC: 96 MG/DL (ref 70–99)
HCT VFR BLD CALC: 24.9 % (ref 42–52)
HEMOGLOBIN: 8.2 GM/DL (ref 13.5–18)
IONIZED CA: 1.19 MMOL/L (ref 1.12–1.32)
MAGNESIUM: 2 MG/DL (ref 1.8–2.4)
MCH RBC QN AUTO: 30.5 PG (ref 27–31)
MCHC RBC AUTO-ENTMCNC: 32.9 % (ref 32–36)
MCV RBC AUTO: 92.6 FL (ref 78–100)
PDW BLD-RTO: 13.8 % (ref 11.7–14.9)
PLATELET # BLD: 70 K/CU MM (ref 140–440)
PMV BLD AUTO: 11.5 FL (ref 7.5–11.1)
POTASSIUM SERPL-SCNC: 3.9 MMOL/L (ref 3.5–5.1)
RBC # BLD: 2.69 M/CU MM (ref 4.6–6.2)
SODIUM BLD-SCNC: 135 MMOL/L (ref 135–145)
WBC # BLD: 7.5 K/CU MM (ref 4–10.5)

## 2021-09-12 PROCEDURE — 94150 VITAL CAPACITY TEST: CPT

## 2021-09-12 PROCEDURE — 6370000000 HC RX 637 (ALT 250 FOR IP): Performed by: PHYSICIAN ASSISTANT

## 2021-09-12 PROCEDURE — 86022 PLATELET ANTIBODIES: CPT

## 2021-09-12 PROCEDURE — 80048 BASIC METABOLIC PNL TOTAL CA: CPT

## 2021-09-12 PROCEDURE — 82962 GLUCOSE BLOOD TEST: CPT

## 2021-09-12 PROCEDURE — 2000000000 HC ICU R&B

## 2021-09-12 PROCEDURE — 82330 ASSAY OF CALCIUM: CPT

## 2021-09-12 PROCEDURE — 2580000003 HC RX 258: Performed by: PHYSICIAN ASSISTANT

## 2021-09-12 PROCEDURE — 6360000002 HC RX W HCPCS: Performed by: THORACIC SURGERY (CARDIOTHORACIC VASCULAR SURGERY)

## 2021-09-12 PROCEDURE — 94761 N-INVAS EAR/PLS OXIMETRY MLT: CPT

## 2021-09-12 PROCEDURE — 94640 AIRWAY INHALATION TREATMENT: CPT

## 2021-09-12 PROCEDURE — 6360000002 HC RX W HCPCS: Performed by: PHYSICIAN ASSISTANT

## 2021-09-12 PROCEDURE — 71045 X-RAY EXAM CHEST 1 VIEW: CPT

## 2021-09-12 PROCEDURE — 85027 COMPLETE CBC AUTOMATED: CPT

## 2021-09-12 PROCEDURE — 99232 SBSQ HOSP IP/OBS MODERATE 35: CPT | Performed by: INTERNAL MEDICINE

## 2021-09-12 PROCEDURE — 83735 ASSAY OF MAGNESIUM: CPT

## 2021-09-12 RX ADMIN — ATORVASTATIN CALCIUM 20 MG: 20 TABLET, FILM COATED ORAL at 21:21

## 2021-09-12 RX ADMIN — ACETAMINOPHEN 325 MG: 325 TABLET ORAL at 23:24

## 2021-09-12 RX ADMIN — Medication 2 PUFF: at 07:13

## 2021-09-12 RX ADMIN — INSULIN HUMAN 5.6 UNITS/HR: 1 INJECTION, SOLUTION INTRAVENOUS at 14:55

## 2021-09-12 RX ADMIN — KETOROLAC TROMETHAMINE 15 MG: 30 INJECTION, SOLUTION INTRAMUSCULAR; INTRAVENOUS at 19:27

## 2021-09-12 RX ADMIN — Medication 2 PUFF: at 21:29

## 2021-09-12 RX ADMIN — Medication 2 PUFF: at 12:01

## 2021-09-12 RX ADMIN — SODIUM CHLORIDE, PRESERVATIVE FREE 10 ML: 5 INJECTION INTRAVENOUS at 08:29

## 2021-09-12 RX ADMIN — Medication: at 08:31

## 2021-09-12 RX ADMIN — ALBUTEROL SULFATE 2 PUFF: 90 AEROSOL, METERED RESPIRATORY (INHALATION) at 07:12

## 2021-09-12 RX ADMIN — HYDROCODONE BITARTRATE AND ACETAMINOPHEN 2 TABLET: 5; 325 TABLET ORAL at 00:03

## 2021-09-12 RX ADMIN — THERA TABS 1 TABLET: TAB at 08:30

## 2021-09-12 RX ADMIN — PANTOPRAZOLE SODIUM 40 MG: 40 TABLET, DELAYED RELEASE ORAL at 08:29

## 2021-09-12 RX ADMIN — HYDROCODONE BITARTRATE AND ACETAMINOPHEN 2 TABLET: 5; 325 TABLET ORAL at 14:56

## 2021-09-12 RX ADMIN — ALBUTEROL SULFATE 2 PUFF: 90 AEROSOL, METERED RESPIRATORY (INHALATION) at 12:02

## 2021-09-12 RX ADMIN — TAMSULOSIN HYDROCHLORIDE 0.8 MG: 0.4 CAPSULE ORAL at 08:30

## 2021-09-12 RX ADMIN — Medication: at 21:20

## 2021-09-12 RX ADMIN — AMIODARONE HYDROCHLORIDE 200 MG: 200 TABLET ORAL at 21:21

## 2021-09-12 RX ADMIN — AMIODARONE HYDROCHLORIDE 200 MG: 200 TABLET ORAL at 14:56

## 2021-09-12 RX ADMIN — HYDROCODONE BITARTRATE AND ACETAMINOPHEN 2 TABLET: 5; 325 TABLET ORAL at 06:46

## 2021-09-12 RX ADMIN — FUROSEMIDE 20 MG: 10 INJECTION, SOLUTION INTRAVENOUS at 08:31

## 2021-09-12 RX ADMIN — CARVEDILOL 3.12 MG: 3.12 TABLET, FILM COATED ORAL at 21:21

## 2021-09-12 RX ADMIN — ALBUTEROL SULFATE 2 PUFF: 90 AEROSOL, METERED RESPIRATORY (INHALATION) at 21:28

## 2021-09-12 RX ADMIN — HYDROCODONE BITARTRATE AND ACETAMINOPHEN 1 TABLET: 5; 325 TABLET ORAL at 23:24

## 2021-09-12 RX ADMIN — HYDROCODONE BITARTRATE AND ACETAMINOPHEN 1 TABLET: 5; 325 TABLET ORAL at 11:00

## 2021-09-12 RX ADMIN — ASPIRIN 81 MG: 81 TABLET, COATED ORAL at 08:30

## 2021-09-12 RX ADMIN — AMIODARONE HYDROCHLORIDE 200 MG: 200 TABLET ORAL at 08:29

## 2021-09-12 RX ADMIN — FUROSEMIDE 20 MG: 10 INJECTION, SOLUTION INTRAVENOUS at 18:30

## 2021-09-12 RX ADMIN — SODIUM CHLORIDE, PRESERVATIVE FREE 10 ML: 5 INJECTION INTRAVENOUS at 21:21

## 2021-09-12 RX ADMIN — KETOROLAC TROMETHAMINE 15 MG: 30 INJECTION, SOLUTION INTRAMUSCULAR; INTRAVENOUS at 03:42

## 2021-09-12 RX ADMIN — SENNOSIDES AND DOCUSATE SODIUM 1 TABLET: 50; 8.6 TABLET ORAL at 08:30

## 2021-09-12 RX ADMIN — CARVEDILOL 3.12 MG: 3.12 TABLET, FILM COATED ORAL at 08:30

## 2021-09-12 ASSESSMENT — PAIN DESCRIPTION - PROGRESSION
CLINICAL_PROGRESSION: GRADUALLY IMPROVING
CLINICAL_PROGRESSION: GRADUALLY WORSENING
CLINICAL_PROGRESSION: GRADUALLY IMPROVING
CLINICAL_PROGRESSION: GRADUALLY WORSENING
CLINICAL_PROGRESSION: GRADUALLY IMPROVING
CLINICAL_PROGRESSION: GRADUALLY WORSENING

## 2021-09-12 ASSESSMENT — PAIN - FUNCTIONAL ASSESSMENT
PAIN_FUNCTIONAL_ASSESSMENT: ACTIVITIES ARE NOT PREVENTED

## 2021-09-12 ASSESSMENT — PAIN DESCRIPTION - FREQUENCY
FREQUENCY: CONTINUOUS

## 2021-09-12 ASSESSMENT — PAIN DESCRIPTION - ONSET
ONSET: ON-GOING

## 2021-09-12 ASSESSMENT — PAIN DESCRIPTION - DIRECTION
RADIATING_TOWARDS: NO WHERE
RADIATING_TOWARDS: BACK

## 2021-09-12 ASSESSMENT — PAIN DESCRIPTION - PAIN TYPE
TYPE: SURGICAL PAIN

## 2021-09-12 ASSESSMENT — PAIN SCALES - GENERAL
PAINLEVEL_OUTOF10: 7
PAINLEVEL_OUTOF10: 7
PAINLEVEL_OUTOF10: 6
PAINLEVEL_OUTOF10: 2
PAINLEVEL_OUTOF10: 7
PAINLEVEL_OUTOF10: 6
PAINLEVEL_OUTOF10: 2
PAINLEVEL_OUTOF10: 6
PAINLEVEL_OUTOF10: 6
PAINLEVEL_OUTOF10: 7
PAINLEVEL_OUTOF10: 2

## 2021-09-12 ASSESSMENT — PAIN DESCRIPTION - ORIENTATION
ORIENTATION: MID

## 2021-09-12 ASSESSMENT — PAIN DESCRIPTION - LOCATION
LOCATION: CHEST

## 2021-09-12 ASSESSMENT — PAIN DESCRIPTION - DESCRIPTORS
DESCRIPTORS: ACHING
DESCRIPTORS: ACHING;DISCOMFORT
DESCRIPTORS: ACHING;DISCOMFORT
DESCRIPTORS: ACHING
DESCRIPTORS: ACHING
DESCRIPTORS: ACHING;DISCOMFORT
DESCRIPTORS: ACHING

## 2021-09-12 NOTE — PROGRESS NOTES
Cardiology Progress Note     Admit Date:  9/10/2021    Consult reason/ Seen today for :   Post CABG     Subjective and  Overnight Events :  He is doing great , has some chest tightness , plan for chest tube removal soon       Chief complain on admission : 78 y. o.year old who is admitted forNo chief complaint on file. Assessment / Plan:  Post CABG care as per primary team CT surgery and excellent care by CV team  Started on Coreg and amiodarone as per protocol  Blood pressures well controlled  DVT Prophylaxis if no contraindication  We will see in office call with questions    Past medical history:    has a past medical history of Abnormal Heart Score CT, Arthritis, CAD (coronary artery disease), Cancer ([facility] [address] Diabetes mellitus ([facility] [address] Elevated d-dimer, Fatigue, H/O echocardiogram, History of exercise stress test, History of nuclear stress test, Hyperlipidemia, Hypertension, Obesity, and Psoriasis. Past surgical history:   has a past surgical history that includes fracture surgery (Right, 1969); fracture surgery (Left, 1994); fracture surgery (2011); Cholecystectomy (1999); hernia repair (1999); cyst incision and drainage (Right, 2013); Colonoscopy (10/22/2003); Colonoscopy (2010); Colonoscopy (12/05/2017); and Bladder surgery. Social History:   reports that he has been smoking cigars. He has never used smokeless tobacco. He reports current alcohol use. He reports that he does not use drugs. Family history:  family history includes Diabetes in his brother, brother, brother, father, and paternal grandmother; Stroke in his mother.     Allergies   Allergen Reactions    Clarithromycin     Levaquin [Levofloxacin] Hives    Bicillin [Penicillin G Benzathine] Rash       Review of Systems:    All 14 systems were reviewed and are negative  Except for the positive findings  which as documented     BP (!) 109/52   Pulse 79   Temp 98 °F (36.7 °C) (Oral)   Resp 17   Ht 5' 8\" (1.727 m)   Wt 187 lb 13.3 oz (85.2 kg)   SpO2 100%   BMI 28.56 kg/m²       Intake/Output Summary (Last 24 hours) at 9/12/2021 1501  Last data filed at 9/12/2021 1300  Gross per 24 hour   Intake 2990.42 ml   Output 2340 ml   Net 650.42 ml     Physical Exam:  Constitutional:  Well developed, Well nourished, No acute distress, Non-toxic appearance. HENT:  Normocephalic, Atraumatic, Bilateral external ears normal, Oropharynx moist, No oral exudates, Nose normal. Neck- Normal range of motion, No tenderness, Supple, No stridor. Eyes:  PERRL, EOMI, Conjunctiva normal, No discharge. Respiratory:  Normal breath sounds, No respiratory distress, No wheezing, No chest tenderness. Cardiovascular:  Normal heart rate, Normal rhythm, No murmurs, No rubs, No gallops, JVP not elevated  Abdomen/GI:  Bowel sounds normal, Soft, No tenderness, No masses, No pulsatile masses. Musculoskeletal:  Intact distal pulses, No edema, No tenderness, No cyanosis, No clubbing. Good range of motion in all major joints. No tenderness to palpation or major deformities noted. Back- No tenderness. Integument:  Warm, Dry, No erythema, No rash. Lymphatic:  No lymphadenopathy noted. Neurologic:  Alert & oriented x 3, Normal motor function, Normal sensory function, No focal deficits noted.    Psychiatric:  Affect  and  Mood :no change    Medications:    furosemide  20 mg IntraVENous BID    tamsulosin  0.8 mg Oral Daily    sodium chloride flush  10 mL IntraVENous 2 times per day    aspirin  81 mg Oral Daily    amiodarone  200 mg Oral TID    mupirocin   Nasal BID    multivitamin  1 tablet Oral Daily with breakfast    sennosides-docusate sodium  1 tablet Oral Daily    carvedilol  3.125 mg Oral BID    atorvastatin  20 mg Oral Nightly    pantoprazole  40 mg Oral Daily    albuterol sulfate HFA  2 puff Inhalation 4x daily    ipratropium  2 puff Inhalation 4x daily    [START ON 9/15/2021] amiodarone condition listed as is except for changes mentioned above. Thank you very much for consult , please call with questions.     Geneva Cotter MD, MD 9/12/2021 3:01 PM

## 2021-09-12 NOTE — PROGRESS NOTES
Pt educated on removal of chest tubes, deep breathing prior to removing chest tube. Pt verbalizes understanding. Chest tubes removed per order per sterile procedure, retention sutures tightened and trimmed post removal, occlusive dressing placed, pt tolerated very well. Educated on bedrest x2 hours and post removal chest xray. Pt verbalizes understanding, will continue to monitor closely.    Electronically signed by Juan José Gonzales RN on 9/12/2021 at 4:23 PM

## 2021-09-12 NOTE — PLAN OF CARE
Problem: Pain:  Description: Pain management should include both nonpharmacologic and pharmacologic interventions. Goal: Pain level will decrease  Description: Pain level will decrease  Outcome: Ongoing  Goal: Control of acute pain  Description: Control of acute pain  Outcome: Ongoing  Goal: Control of chronic pain  Description: Control of chronic pain  Outcome: Ongoing     Problem: Skin Integrity:  Goal: Will show no infection signs and symptoms  Description: Will show no infection signs and symptoms  Outcome: Ongoing  Goal: Absence of new skin breakdown  Description: Absence of new skin breakdown  Outcome: Ongoing     Problem: Discharge Planning:  Goal: Discharged to appropriate level of care  Description: Discharged to appropriate level of care  Outcome: Ongoing     Problem:  Activity Intolerance:  Goal: Able to perform prescribed physical activity  Description: Able to perform prescribed physical activity  Outcome: Ongoing  Goal: Ability to tolerate increased activity will improve  Description: Ability to tolerate increased activity will improve  Outcome: Ongoing     Problem: Anxiety:  Goal: Level of anxiety will decrease  Description: Level of anxiety will decrease  Outcome: Ongoing     Problem: Cardiac Output - Decreased:  Goal: Cardiac output within specified parameters  Description: Cardiac output within specified parameters  Outcome: Ongoing  Goal: Hemodynamic stability will improve  Description: Hemodynamic stability will improve  Outcome: Ongoing     Problem: Fluid Volume - Imbalance:  Goal: Ability to achieve a balanced intake and output will improve  Description: Ability to achieve a balanced intake and output will improve  Outcome: Ongoing  Goal: Chest tube drainage is within specified parameters  Description: Chest tube drainage is within specified parameters  Outcome: Ongoing     Problem: Gas Exchange - Impaired:  Goal: Levels of oxygenation will improve  Description: Levels of oxygenation will improve  Outcome: Ongoing  Goal: Ability to maintain adequate ventilation will improve  Description: Ability to maintain adequate ventilation will improve  Outcome: Ongoing     Problem: Pain:  Description: Pain management should include both nonpharmacologic and pharmacologic interventions.   Goal: Pain level will decrease  Description: Pain level will decrease  Outcome: Ongoing  Goal: Control of acute pain  Description: Control of acute pain  Outcome: Ongoing  Goal: Control of chronic pain  Description: Control of chronic pain  Outcome: Ongoing     Problem: Tissue Perfusion - Cardiopulmonary, Altered:  Goal: Hemodynamic stability will improve  Description: Hemodynamic stability will improve  Outcome: Ongoing  Goal: Absence of angina  Description: Absence of angina  Outcome: Ongoing  Goal: Hemodynamic stability will improve  Description: Hemodynamic stability will improve  Outcome: Ongoing  Goal: Will show no evidence of cardiac arrhythmias  Description: Will show no evidence of cardiac arrhythmias  Outcome: Ongoing     Problem: Tobacco Use:  Goal: Will participate in inpatient tobacco-use cessation counseling  Description: Will participate in inpatient tobacco-use cessation counseling  Outcome: Ongoing     Problem: Falls - Risk of:  Goal: Will remain free from falls  Description: Will remain free from falls  Outcome: Ongoing  Goal: Absence of physical injury  Description: Absence of physical injury  Outcome: Ongoing

## 2021-09-12 NOTE — PROGRESS NOTES
PATIENT NAME: Anand Burnett    TODAY'S DATE: 09/12/21    SUBJECTIVE:    Pt is POD # 2 s/p CABG x 4. Pt is feeling well. Minimal complaints. Has been ambulating. OBJECTIVE:   VITALS:    Vitals:    09/12/21 0800   BP: 125/62   Pulse: 87   Resp: 16   Temp: 98 °F (36.7 °C)   SpO2: 100%     INTAKE/OUTPUT:    Date 09/12/21 0000 - 09/12/21 2359   Shift 5904-8575 0342-1900 4905-7180 24 Hour Total   INTAKE   P.O. 240   240   I. V.(mL/kg/hr) 242(0.4)   242   Shift Total(mL/kg) 482(5.7)   482(5.7)   OUTPUT   Urine(mL/kg/hr) 875(1.3)   875   Chest Tube 50   50   Shift Total(mL/kg) 925(10.9)   925(10.9)   Weight (kg) 85.2 85.2 85.2 85.2      Patient Vitals for the past 96 hrs (Last 3 readings):   Weight   09/12/21 0615 187 lb 13.3 oz (85.2 kg)   09/11/21 0600 194 lb 0.1 oz (88 kg)   09/10/21 0602 180 lb (81.6 kg)       EXAM:  Blood pressure 125/62, pulse 87, temperature 98 °F (36.7 °C), temperature source Oral, resp. rate 16, height 5' 8\" (1.727 m), weight 187 lb 13.3 oz (85.2 kg), SpO2 100 %. General appearance: No apparent distress, appears stated age and cooperative. Skin: unremarkable  HEENT Normocephalic, atraumatic without obvious deformity. Neck: Supple, Trachea midline   Lungs: Good respiratory effort. Clear to auscultation, bilaterally  Heart: Regular rate/ rhythm inc c/d/i  Abdomen: Soft, non-tender or non-distended   Extremities: min edema warm well perfused  Neurologic: Alert, grossly intact  Mental status: normal affect      Data:  CBC:   Recent Labs     09/10/21  1150 09/10/21  1251 09/10/21  2022 09/11/21  0500 09/12/21  0455   WBC 21.2*  --   --  8.9 7.5   HGB 10.6*   < > 9.2* 8.7* 8.2*   HCT 31.7*   < > 27.0* 26.6* 24.9*     --   --  94* 70*    < > = values in this interval not displayed.      BMP:    Recent Labs     09/10/21  1150 09/10/21  1251 09/10/21  2022 09/10/21  2022 09/11/21  0005 09/11/21  0500 09/12/21  0455      < > 139  --   --  139 135   K 3.7   < > 4.5   < > 4.6 4.4 3.9   *  --   --   --   --  107 103   CO2 22   < > 22  --   --  21 26   BUN 11  --   --   --   --  13 12   CREATININE 0.8*   < > 1.0  --   --  0.8* 0.8*   GLUCOSE 162*  --   --   --   --  84 110*    < > = values in this interval not displayed. Hepatic: No results for input(s): AST, ALT, ALB, BILITOT, ALKPHOS in the last 72 hours. Mag:      Recent Labs     09/10/21  1150 09/11/21  0500 09/12/21  0455   MG 2.3 1.7* 2.0      Phos:   No results for input(s): PHOS in the last 72 hours. INR:   Recent Labs     09/09/21  1000 09/10/21  1150   INR 0.97 1.12       Radiology Review:  CXR  Impression:     Stable right lung base pulmonary opacities.  No significant effusion or   pneumothorax. ASSESSMENT AND PLAN:    Patient Active Problem List   Diagnosis    Chest pain    Syncope    Diabetes mellitus ([address]    Hyperlipidemia    Hypertension    CAD in native artery       S/P CABG x 4    Cardio: stable on low dose coreg, PO amio  Pulm: stable on RA, encourage IS  GI: suppository today   Renal: creatinine stable 0.8, adequate UOP, weight down.  Continue lasix BID  Tubes/Lines: DC CT, hernandez  Wound: stable    Laila Chowdhury PA-C

## 2021-09-13 LAB
ANION GAP SERPL CALCULATED.3IONS-SCNC: 11 MMOL/L (ref 4–16)
BUN BLDV-MCNC: 13 MG/DL (ref 6–23)
CALCIUM IONIZED: 4.56 MG/DL (ref 4.48–5.28)
CALCIUM SERPL-MCNC: 7.9 MG/DL (ref 8.3–10.6)
CHLORIDE BLD-SCNC: 101 MMOL/L (ref 99–110)
CO2: 24 MMOL/L (ref 21–32)
CREAT SERPL-MCNC: 0.8 MG/DL (ref 0.9–1.3)
GFR AFRICAN AMERICAN: >60 ML/MIN/1.73M2
GFR NON-AFRICAN AMERICAN: >60 ML/MIN/1.73M2
GLUCOSE BLD-MCNC: 107 MG/DL (ref 70–99)
GLUCOSE BLD-MCNC: 108 MG/DL (ref 70–99)
GLUCOSE BLD-MCNC: 121 MG/DL (ref 70–99)
GLUCOSE BLD-MCNC: 266 MG/DL (ref 70–99)
GLUCOSE BLD-MCNC: 282 MG/DL (ref 70–99)
GLUCOSE BLD-MCNC: 328 MG/DL (ref 70–99)
GLUCOSE BLD-MCNC: 86 MG/DL (ref 70–99)
GLUCOSE BLD-MCNC: 86 MG/DL (ref 70–99)
GLUCOSE BLD-MCNC: 94 MG/DL (ref 70–99)
HCT VFR BLD CALC: 26 % (ref 42–52)
HEMOGLOBIN: 8.5 GM/DL (ref 13.5–18)
IONIZED CA: 1.14 MMOL/L (ref 1.12–1.32)
MAGNESIUM: 1.8 MG/DL (ref 1.8–2.4)
MCH RBC QN AUTO: 30.4 PG (ref 27–31)
MCHC RBC AUTO-ENTMCNC: 32.7 % (ref 32–36)
MCV RBC AUTO: 92.9 FL (ref 78–100)
PDW BLD-RTO: 13.4 % (ref 11.7–14.9)
PLATELET # BLD: 94 K/CU MM (ref 140–440)
PMV BLD AUTO: 11.8 FL (ref 7.5–11.1)
POTASSIUM SERPL-SCNC: 3.4 MMOL/L (ref 3.5–5.1)
RBC # BLD: 2.8 M/CU MM (ref 4.6–6.2)
SODIUM BLD-SCNC: 136 MMOL/L (ref 135–145)
WBC # BLD: 7.3 K/CU MM (ref 4–10.5)

## 2021-09-13 PROCEDURE — 85027 COMPLETE CBC AUTOMATED: CPT

## 2021-09-13 PROCEDURE — 97116 GAIT TRAINING THERAPY: CPT

## 2021-09-13 PROCEDURE — 83735 ASSAY OF MAGNESIUM: CPT

## 2021-09-13 PROCEDURE — 97530 THERAPEUTIC ACTIVITIES: CPT

## 2021-09-13 PROCEDURE — 80048 BASIC METABOLIC PNL TOTAL CA: CPT

## 2021-09-13 PROCEDURE — 82330 ASSAY OF CALCIUM: CPT

## 2021-09-13 PROCEDURE — 2580000003 HC RX 258: Performed by: PHYSICIAN ASSISTANT

## 2021-09-13 PROCEDURE — 94761 N-INVAS EAR/PLS OXIMETRY MLT: CPT

## 2021-09-13 PROCEDURE — 82962 GLUCOSE BLOOD TEST: CPT

## 2021-09-13 PROCEDURE — 97110 THERAPEUTIC EXERCISES: CPT

## 2021-09-13 PROCEDURE — 6360000002 HC RX W HCPCS: Performed by: PHYSICIAN ASSISTANT

## 2021-09-13 PROCEDURE — 6370000000 HC RX 637 (ALT 250 FOR IP): Performed by: PHYSICIAN ASSISTANT

## 2021-09-13 PROCEDURE — 6360000002 HC RX W HCPCS: Performed by: THORACIC SURGERY (CARDIOTHORACIC VASCULAR SURGERY)

## 2021-09-13 PROCEDURE — 94640 AIRWAY INHALATION TREATMENT: CPT

## 2021-09-13 PROCEDURE — 2000000000 HC ICU R&B

## 2021-09-13 PROCEDURE — 6370000000 HC RX 637 (ALT 250 FOR IP): Performed by: THORACIC SURGERY (CARDIOTHORACIC VASCULAR SURGERY)

## 2021-09-13 PROCEDURE — 6370000000 HC RX 637 (ALT 250 FOR IP): Performed by: INTERNAL MEDICINE

## 2021-09-13 RX ORDER — HYDROCODONE BITARTRATE AND ACETAMINOPHEN 5; 325 MG/1; MG/1
1 TABLET ORAL EVERY 6 HOURS PRN
Status: DISCONTINUED | OUTPATIENT
Start: 2021-09-13 | End: 2021-09-14 | Stop reason: HOSPADM

## 2021-09-13 RX ORDER — FUROSEMIDE 10 MG/ML
40 INJECTION INTRAMUSCULAR; INTRAVENOUS 2 TIMES DAILY
Status: DISCONTINUED | OUTPATIENT
Start: 2021-09-13 | End: 2021-09-14 | Stop reason: HOSPADM

## 2021-09-13 RX ORDER — NICOTINE POLACRILEX 4 MG
15 LOZENGE BUCCAL PRN
Status: DISCONTINUED | OUTPATIENT
Start: 2021-09-13 | End: 2021-09-14 | Stop reason: HOSPADM

## 2021-09-13 RX ORDER — DEXTROSE MONOHYDRATE 25 G/50ML
12.5 INJECTION, SOLUTION INTRAVENOUS PRN
Status: DISCONTINUED | OUTPATIENT
Start: 2021-09-13 | End: 2021-09-14 | Stop reason: HOSPADM

## 2021-09-13 RX ORDER — DEXTROSE MONOHYDRATE 50 MG/ML
100 INJECTION, SOLUTION INTRAVENOUS PRN
Status: DISCONTINUED | OUTPATIENT
Start: 2021-09-13 | End: 2021-09-14 | Stop reason: HOSPADM

## 2021-09-13 RX ORDER — BISACODYL 10 MG
10 SUPPOSITORY, RECTAL RECTAL DAILY
Status: DISCONTINUED | OUTPATIENT
Start: 2021-09-13 | End: 2021-09-14 | Stop reason: HOSPADM

## 2021-09-13 RX ORDER — GLIPIZIDE 5 MG/1
5 TABLET ORAL
Status: DISCONTINUED | OUTPATIENT
Start: 2021-09-14 | End: 2021-09-14

## 2021-09-13 RX ADMIN — AMIODARONE HYDROCHLORIDE 200 MG: 200 TABLET ORAL at 20:40

## 2021-09-13 RX ADMIN — ACETAMINOPHEN 325 MG: 325 TABLET ORAL at 04:45

## 2021-09-13 RX ADMIN — ATORVASTATIN CALCIUM 20 MG: 20 TABLET, FILM COATED ORAL at 20:40

## 2021-09-13 RX ADMIN — HYDROCODONE BITARTRATE AND ACETAMINOPHEN 1 TABLET: 5; 325 TABLET ORAL at 04:45

## 2021-09-13 RX ADMIN — HYDROCODONE BITARTRATE AND ACETAMINOPHEN 1 TABLET: 5; 325 TABLET ORAL at 08:53

## 2021-09-13 RX ADMIN — ALBUTEROL SULFATE 2 PUFF: 90 AEROSOL, METERED RESPIRATORY (INHALATION) at 11:26

## 2021-09-13 RX ADMIN — SENNOSIDES AND DOCUSATE SODIUM 1 TABLET: 50; 8.6 TABLET ORAL at 08:51

## 2021-09-13 RX ADMIN — Medication: at 08:51

## 2021-09-13 RX ADMIN — ACETAMINOPHEN 650 MG: 325 TABLET ORAL at 14:21

## 2021-09-13 RX ADMIN — SODIUM CHLORIDE, PRESERVATIVE FREE 10 ML: 5 INJECTION INTRAVENOUS at 08:52

## 2021-09-13 RX ADMIN — CARVEDILOL 3.12 MG: 3.12 TABLET, FILM COATED ORAL at 08:52

## 2021-09-13 RX ADMIN — SODIUM CHLORIDE, PRESERVATIVE FREE 10 ML: 5 INJECTION INTRAVENOUS at 20:42

## 2021-09-13 RX ADMIN — TAMSULOSIN HYDROCHLORIDE 0.8 MG: 0.4 CAPSULE ORAL at 08:51

## 2021-09-13 RX ADMIN — ASPIRIN 81 MG: 81 TABLET, COATED ORAL at 08:51

## 2021-09-13 RX ADMIN — BISACODYL 10 MG: 10 SUPPOSITORY RECTAL at 10:56

## 2021-09-13 RX ADMIN — HYDROCORTISONE: 1 CREAM TOPICAL at 08:53

## 2021-09-13 RX ADMIN — HYDROCODONE BITARTRATE AND ACETAMINOPHEN 1 TABLET: 5; 325 TABLET ORAL at 20:40

## 2021-09-13 RX ADMIN — ALBUTEROL SULFATE 2 PUFF: 90 AEROSOL, METERED RESPIRATORY (INHALATION) at 07:51

## 2021-09-13 RX ADMIN — PANTOPRAZOLE SODIUM 40 MG: 40 TABLET, DELAYED RELEASE ORAL at 08:51

## 2021-09-13 RX ADMIN — FUROSEMIDE 40 MG: 10 INJECTION, SOLUTION INTRAMUSCULAR; INTRAVENOUS at 08:52

## 2021-09-13 RX ADMIN — FUROSEMIDE 40 MG: 10 INJECTION, SOLUTION INTRAMUSCULAR; INTRAVENOUS at 18:43

## 2021-09-13 RX ADMIN — Medication 2 PUFF: at 19:05

## 2021-09-13 RX ADMIN — INSULIN LISPRO 3 UNITS: 100 INJECTION, SOLUTION INTRAVENOUS; SUBCUTANEOUS at 18:39

## 2021-09-13 RX ADMIN — Medication 2 PUFF: at 11:27

## 2021-09-13 RX ADMIN — MAGNESIUM SULFATE HEPTAHYDRATE 2000 MG: 40 INJECTION, SOLUTION INTRAVENOUS at 14:12

## 2021-09-13 RX ADMIN — Medication 2 PUFF: at 07:51

## 2021-09-13 RX ADMIN — Medication: at 20:43

## 2021-09-13 RX ADMIN — THERA TABS 1 TABLET: TAB at 08:51

## 2021-09-13 RX ADMIN — AMIODARONE HYDROCHLORIDE 200 MG: 200 TABLET ORAL at 08:51

## 2021-09-13 RX ADMIN — POTASSIUM CHLORIDE 20 MEQ: 29.8 INJECTION, SOLUTION INTRAVENOUS at 13:29

## 2021-09-13 RX ADMIN — CALCIUM GLUCONATE 2000 MG: 20 INJECTION, SOLUTION INTRAVENOUS at 13:32

## 2021-09-13 RX ADMIN — CARVEDILOL 3.12 MG: 3.12 TABLET, FILM COATED ORAL at 20:42

## 2021-09-13 RX ADMIN — ALBUTEROL SULFATE 2 PUFF: 90 AEROSOL, METERED RESPIRATORY (INHALATION) at 19:05

## 2021-09-13 RX ADMIN — INSULIN LISPRO 3 UNITS: 100 INJECTION, SOLUTION INTRAVENOUS; SUBCUTANEOUS at 13:35

## 2021-09-13 RX ADMIN — ACETAMINOPHEN 325 MG: 325 TABLET ORAL at 20:41

## 2021-09-13 RX ADMIN — AMIODARONE HYDROCHLORIDE 200 MG: 200 TABLET ORAL at 13:12

## 2021-09-13 RX ADMIN — ACETAMINOPHEN 650 MG: 325 TABLET ORAL at 18:43

## 2021-09-13 ASSESSMENT — PAIN - FUNCTIONAL ASSESSMENT
PAIN_FUNCTIONAL_ASSESSMENT: ACTIVITIES ARE NOT PREVENTED
PAIN_FUNCTIONAL_ASSESSMENT: PREVENTS OR INTERFERES SOME ACTIVE ACTIVITIES AND ADLS
PAIN_FUNCTIONAL_ASSESSMENT: ACTIVITIES ARE NOT PREVENTED

## 2021-09-13 ASSESSMENT — PAIN DESCRIPTION - PAIN TYPE
TYPE: ACUTE PAIN;SURGICAL PAIN
TYPE: SURGICAL PAIN
TYPE: SURGICAL PAIN

## 2021-09-13 ASSESSMENT — PAIN DESCRIPTION - ORIENTATION
ORIENTATION: MID

## 2021-09-13 ASSESSMENT — PAIN SCALES - GENERAL
PAINLEVEL_OUTOF10: 2
PAINLEVEL_OUTOF10: 2
PAINLEVEL_OUTOF10: 3
PAINLEVEL_OUTOF10: 2
PAINLEVEL_OUTOF10: 1
PAINLEVEL_OUTOF10: 4
PAINLEVEL_OUTOF10: 5
PAINLEVEL_OUTOF10: 3
PAINLEVEL_OUTOF10: 5
PAINLEVEL_OUTOF10: 2

## 2021-09-13 ASSESSMENT — PAIN DESCRIPTION - DIRECTION: RADIATING_TOWARDS: BACK

## 2021-09-13 ASSESSMENT — PAIN DESCRIPTION - PROGRESSION
CLINICAL_PROGRESSION: GRADUALLY WORSENING
CLINICAL_PROGRESSION: GRADUALLY IMPROVING
CLINICAL_PROGRESSION: NOT CHANGED
CLINICAL_PROGRESSION: GRADUALLY WORSENING
CLINICAL_PROGRESSION: GRADUALLY WORSENING

## 2021-09-13 ASSESSMENT — PAIN DESCRIPTION - FREQUENCY
FREQUENCY: CONTINUOUS
FREQUENCY: INTERMITTENT
FREQUENCY: CONTINUOUS

## 2021-09-13 ASSESSMENT — PAIN DESCRIPTION - ONSET
ONSET: ON-GOING
ONSET: GRADUAL
ONSET: ON-GOING

## 2021-09-13 ASSESSMENT — PAIN DESCRIPTION - DESCRIPTORS
DESCRIPTORS: ACHING
DESCRIPTORS: ACHING
DESCRIPTORS: ACHING;DISCOMFORT
DESCRIPTORS: ACHING
DESCRIPTORS: DULL

## 2021-09-13 ASSESSMENT — PAIN DESCRIPTION - LOCATION
LOCATION: CHEST
LOCATION: CHEST;STERNUM
LOCATION: CHEST
LOCATION: CHEST;STERNUM
LOCATION: CHEST;STERNUM

## 2021-09-13 NOTE — PROGRESS NOTES
I met with patient in room. This is POD # 3. I re-introduced myself to patient as the cardiac rehab nurse, as well as re-introducing the [facility].  I explained to that this program is a customized out patient program of exercise and education. I explained to the patient that Cardiac Rehab is designed to help improve your hearts future. Cardiac rehab is a medically supervised program designed to improve your cardiovascular health through educating about nutrition, exercise, and stress release. The [facility] program overview video watched by patient at this time. I explained to patient that he would not be starting program for six weeks and that the Cardiac Rehab facility would be in contact with him to setup an appointment when it is closer to his release date from restrictions. Patient had no further questions regarding rehab at this time.

## 2021-09-13 NOTE — PROGRESS NOTES
PATIENT NAME: Fe Yi    TODAY'S DATE: 09/13/21    SUBJECTIVE:    Pt is POD # 3 s/p CABG x 4. Pt is feeling well. OBJECTIVE:   VITALS:    Vitals:    09/13/21 0852   BP: 128/71   Pulse: 81   Resp:    Temp:    SpO2:      INTAKE/OUTPUT:    Date 09/13/21 0000 - 09/13/21 2359   Shift 9037-0612 9261-4799 6174-9314 24 Hour Total   INTAKE   I.V.(mL/kg/hr)  10  10   Shift Total(mL/kg)  10(0.1)  10(0.1)   OUTPUT   Urine(mL/kg/hr) 250(0.4) 250  500   Shift Total(mL/kg) 250(2.9) 250(3)  500(5.9)   Weight (kg) 85.2 84.4 84.4 84.4      Patient Vitals for the past 96 hrs (Last 3 readings):   Weight   09/13/21 0845 186 lb (84.4 kg)   09/12/21 0615 187 lb 13.3 oz (85.2 kg)   09/11/21 0600 194 lb 0.1 oz (88 kg)       EXAM:  Blood pressure 128/71, pulse 81, temperature 98 °F (36.7 °C), temperature source Oral, resp. rate 13, height 5' 8\" (1.727 m), weight (S) 186 lb (84.4 kg), SpO2 98 %. General appearance: No apparent distress, appears stated age and cooperative. Skin: unremarkable  HEENT Normocephalic, atraumatic without obvious deformity. Neck: Supple, Trachea midline   Lungs: Good respiratory effort. Clear to auscultation, bilaterally  Heart: Regular rate/ rhythm inc c/d/i  Abdomen: Soft, non-tender or non-distended   Extremities: min edema warm well perfused  Neurologic: Alert, grossly intact  Mental status: normal affect      Data:  CBC:   Recent Labs     09/11/21  0500 09/12/21 0455 09/13/21 0452   WBC 8.9 7.5 7.3   HGB 8.7* 8.2* 8.5*   HCT 26.6* 24.9* 26.0*   PLT 94* 70* 94*     BMP:    Recent Labs     09/11/21  0500 09/12/21  0455 09/13/21  0452    135 136   K 4.4 3.9 3.4*    103 101   CO2 21 26 24   BUN 13 12 13   CREATININE 0.8* 0.8* 0.8*   GLUCOSE 84 110* 94     Hepatic: No results for input(s): AST, ALT, ALB, BILITOT, ALKPHOS in the last 72 hours.   Mag:      Recent Labs     09/11/21  0500 09/12/21  0455 09/13/21  0452   MG 1.7* 2.0 1.8      Phos:   No results for input(s): PHOS in the last 72 hours. INR:   Recent Labs     09/10/21  1150   INR 1.12       Radiology Review:  CXR  Impression:     No pneumothorax following chest tube removal.            ASSESSMENT AND PLAN:    Patient Active Problem List   Diagnosis    Chest pain    Syncope    Diabetes mellitus ([facility]    Hyperlipidemia    Hypertension    CAD in native artery       S/P CABG x 4    Cardio: stable on low dose coreg, PO amio  Pulm: stable on RA, encourage IS. PA/lat today. GI: no BM yet, suppository today   Renal: creatinine stable 0.8, adequate UOP, weight down. Increase lasix to 40 BID. Tubes/Lines: DC hernandez, pacing wires  Wound: stable    Home tomorrow.      Isma Hall PA-C

## 2021-09-13 NOTE — PROGRESS NOTES
Physical Therapy    Physical Therapy Treatment Note  Name: Fe Yi MRN: [id number] :   1945   Date:  2021   Admission Date: 9/10/2021 Room:  [address] [place] [address]   Restrictions/Precautions:  Restrictions/Precautions  Restrictions/Precautions: Fall Risk, General Precautions, Cardiac     Sternal, no pushing, pulling or lifting more than 5 pounds for the first 2 weeks and then 10 pounds up to 3 months,   Arms are to support chest when changing position, coughing or sneezing.  No lifting arms above 90 degrees except with the ex's  Communication with other providers:  Waijonathoneric Peters states pt is ok to see for therapy  Subjective:  Patient states:  He thinks he has to go to the bathroom  Pain:   Location, Type, Intensity (0/10 to 10/10):  310 chest  Objective:    Observation:  Pt was resting in the bed  Treatment, including education/measures:  Nursing removed hernandez before trans  Vital Signs  HR: 81, B/P 112/62, O2 100% on room air  Transfers with line management of tele  Supine to sit :min A of 1 and VC's for sternal precautions  Scooting :SBA and VC's for sternal precautions  Rolling :CGA  Sit to stand :CGA and VC's for sternal precautions  Stand to sit :CGA and VC's for sternal precautions  Gait:  Pt amb with no AD for 15 ft x 2 and 150 ft with CGA  Pt needed VC's for pathway  Pt did 2 trans <> toilet with CGA  Education:  Pt and his wife were instructed in Sternal Precautions, Purpose of Exercise Program, Dmitri Scale and Signs and Symptoms of Exercise Intolerance per Cardiac Protocol  Answered all questions pt and his wife had and referred nursing for some questions  Pt was instructed in Intermediate Cardiac ex program:sitting  Overhead Side Stretch x 10  Ankle Pumps/ Heel Raises x 10  Marching Seated x 10  Forward Arm Raises x 10  Side Arm Raises x 10  Arm Crosses x 10  Arm Circles Forwards and Backwards x 10  SittingTrunkTwist x 10  Safety  Patient left safely in the chair, with call light/phone in reach with alarm applied. Gait belt and mask were used for transfers and gait. Assessment / Impression:    Patient's tolerance of treatment:  good   Adverse Reaction: none  Significant change in status and impact:  none  Barriers to improvement:  endurance  Plan for Next Session:    Will cont to progress ex's and gt per cardiac protocol and educate pt on sternal precautions, S & S of ex intolerance, purpose of ex's, progression of ex's and gt at home. Time in:  0830  Time out:  1000  Timed treatment minutes:  90  Total treatment time:  80  Previously filed items:  Social/Functional History  Lives With: Spouse  Type of Home: House  Home Layout: One level  Home Access: Stairs to enter with rails  Entrance Stairs - Number of Steps: 3  Entrance Stairs - Rails: Both  Bathroom Shower/Tub: Tub/Shower unit  Bathroom Toilet: Standard  Bathroom Equipment: Grab bars in shower  ADL Assistance: [address]: Independent  Homemaking Responsibilities: Yes  Ambulation Assistance: Independent (without AD)  Transfer Assistance: Independent  Active : Yes  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

## 2021-09-13 NOTE — FLOWSHEET NOTE
Pacer wires removed per Dr Kamilah Matthews' order. Wire free of clot or tissue, Ventricular wire removed without difficulty. Site cleaned with betadine. Dry sterile dressing applied. Patient tolerated well. No signs or symptoms of bleeding. Patient repositioned for comfort. No acute signs of distress noted. V Will monitor for signs or symptoms of cardiac tamponade. Will continue to monitor.        Electronically signed by Madhav Clark RN on 9/13/2021 at 5:01 PM

## 2021-09-13 NOTE — CARE COORDINATION
Cm met with pt to initiate discharge planning. Pt is POD#3 CABG x 4. Pt and spouse reside together in a 1 story home with laundry in the basement. Pt and spouse drive. Pt independent PTA and used no DME. Pt has a recliner and adjustable bed at home. Pt believes that he has access to a shower chair/bench if needed. Pt has PCP and insurance to assist with cost of Rxs. PT eval reviewed and recommendation for home with support at discharge noted. Cm discussed MD plan for [facility]. and offered choices. Pt elects to use [facility]. Perfect Serve message to [facility] liaison, Fransisco Gonzalez RN, re; referral. Neysa Meckel board updated with CM name and pH#.

## 2021-09-13 NOTE — PLAN OF CARE
Problem: Pain:  Description: Pain management should include both nonpharmacologic and pharmacologic interventions. Goal: Pain level will decrease  Description: Pain level will decrease  Outcome: Ongoing  Goal: Control of acute pain  Description: Control of acute pain  Outcome: Ongoing  Goal: Control of chronic pain  Description: Control of chronic pain  Outcome: Ongoing  Goal: Patient's pain/discomfort is manageable  Description: Patient's pain/discomfort is manageable  Outcome: Ongoing     Problem: Skin Integrity:  Goal: Will show no infection signs and symptoms  Description: Will show no infection signs and symptoms  Outcome: Ongoing  Goal: Absence of new skin breakdown  Description: Absence of new skin breakdown  Outcome: Ongoing     Problem: Discharge Planning:  Goal: Discharged to appropriate level of care  Description: Discharged to appropriate level of care  Outcome: Ongoing     Problem:  Activity Intolerance:  Goal: Able to perform prescribed physical activity  Description: Able to perform prescribed physical activity  Outcome: Ongoing  Goal: Ability to tolerate increased activity will improve  Description: Ability to tolerate increased activity will improve  Outcome: Ongoing     Problem: Anxiety:  Goal: Level of anxiety will decrease  Description: Level of anxiety will decrease  Outcome: Ongoing     Problem: Cardiac Output - Decreased:  Goal: Cardiac output within specified parameters  Description: Cardiac output within specified parameters  Outcome: Ongoing  Goal: Hemodynamic stability will improve  Description: Hemodynamic stability will improve  Outcome: Ongoing     Problem: Fluid Volume - Imbalance:  Goal: Ability to achieve a balanced intake and output will improve  Description: Ability to achieve a balanced intake and output will improve  Outcome: Ongoing  Goal: Chest tube drainage is within specified parameters  Description: Chest tube drainage is within specified parameters  Outcome: Ongoing Problem: Gas Exchange - Impaired:  Goal: Levels of oxygenation will improve  Description: Levels of oxygenation will improve  Outcome: Ongoing  Goal: Ability to maintain adequate ventilation will improve  Description: Ability to maintain adequate ventilation will improve  Outcome: Ongoing     Problem: Pain:  Description: Pain management should include both nonpharmacologic and pharmacologic interventions.   Goal: Pain level will decrease  Description: Pain level will decrease  Outcome: Ongoing  Goal: Control of acute pain  Description: Control of acute pain  Outcome: Ongoing  Goal: Control of chronic pain  Description: Control of chronic pain  Outcome: Ongoing     Problem: Tissue Perfusion - Cardiopulmonary, Altered:  Goal: Hemodynamic stability will improve  Description: Hemodynamic stability will improve  Outcome: Ongoing  Goal: Absence of angina  Description: Absence of angina  Outcome: Ongoing  Goal: Hemodynamic stability will improve  Description: Hemodynamic stability will improve  Outcome: Ongoing  Goal: Will show no evidence of cardiac arrhythmias  Description: Will show no evidence of cardiac arrhythmias  Outcome: Ongoing     Problem: Tobacco Use:  Goal: Will participate in inpatient tobacco-use cessation counseling  Description: Will participate in inpatient tobacco-use cessation counseling  Outcome: Ongoing     Problem: Falls - Risk of:  Goal: Will remain free from falls  Description: Will remain free from falls  Outcome: Ongoing  Goal: Absence of physical injury  Description: Absence of physical injury  Outcome: Ongoing     Problem: Infection:  Goal: Will remain free from infection  Description: Will remain free from infection  Outcome: Ongoing     Problem: Safety:  Goal: Free from accidental physical injury  Description: Free from accidental physical injury  Outcome: Ongoing  Goal: Free from intentional harm  Description: Free from intentional harm  Outcome: Ongoing     Problem: Daily Care:  Goal: Daily care needs are met  Description: Daily care needs are met  Outcome: Ongoing     Problem: Skin Integrity:  Goal: Skin integrity will stabilize  Description: Skin integrity will stabilize  Outcome: Ongoing     Problem: Discharge Planning:  Goal: Patients continuum of care needs are met  Description: Patients continuum of care needs are met  Outcome: Ongoing

## 2021-09-14 ENCOUNTER — APPOINTMENT (OUTPATIENT)
Dept: GENERAL RADIOLOGY | Age: 76
DRG: 234 | End: 2021-09-14
Attending: THORACIC SURGERY (CARDIOTHORACIC VASCULAR SURGERY)
Payer: MEDICARE

## 2021-09-14 VITALS
DIASTOLIC BLOOD PRESSURE: 61 MMHG | HEIGHT: 68 IN | TEMPERATURE: 98 F | BODY MASS INDEX: 27.34 KG/M2 | WEIGHT: 180.4 LBS | SYSTOLIC BLOOD PRESSURE: 117 MMHG | HEART RATE: 86 BPM | OXYGEN SATURATION: 100 % | RESPIRATION RATE: 19 BRPM

## 2021-09-14 LAB
ANION GAP SERPL CALCULATED.3IONS-SCNC: 10 MMOL/L (ref 4–16)
BUN BLDV-MCNC: 14 MG/DL (ref 6–23)
CALCIUM IONIZED: 4.6 MG/DL (ref 4.48–5.28)
CALCIUM SERPL-MCNC: 8.4 MG/DL (ref 8.3–10.6)
CHLORIDE BLD-SCNC: 102 MMOL/L (ref 99–110)
CO2: 26 MMOL/L (ref 21–32)
CREAT SERPL-MCNC: 0.8 MG/DL (ref 0.9–1.3)
EKG ATRIAL RATE: 77 BPM
EKG DIAGNOSIS: NORMAL
EKG P AXIS: 60 DEGREES
EKG P-R INTERVAL: 168 MS
EKG Q-T INTERVAL: 404 MS
EKG QRS DURATION: 90 MS
EKG QTC CALCULATION (BAZETT): 457 MS
EKG R AXIS: 1 DEGREES
EKG T AXIS: 32 DEGREES
EKG VENTRICULAR RATE: 77 BPM
GFR AFRICAN AMERICAN: >60 ML/MIN/1.73M2
GFR NON-AFRICAN AMERICAN: >60 ML/MIN/1.73M2
GLUCOSE BLD-MCNC: 211 MG/DL (ref 70–99)
GLUCOSE BLD-MCNC: 225 MG/DL (ref 70–99)
GLUCOSE BLD-MCNC: 267 MG/DL (ref 70–99)
GLUCOSE BLD-MCNC: 291 MG/DL (ref 70–99)
GLUCOSE BLD-MCNC: 309 MG/DL (ref 70–99)
IONIZED CA: 1.15 MMOL/L (ref 1.12–1.32)
MAGNESIUM: 1.8 MG/DL (ref 1.8–2.4)
POTASSIUM SERPL-SCNC: 3.9 MMOL/L (ref 3.5–5.1)
SODIUM BLD-SCNC: 138 MMOL/L (ref 135–145)

## 2021-09-14 PROCEDURE — 93005 ELECTROCARDIOGRAM TRACING: CPT | Performed by: PHYSICIAN ASSISTANT

## 2021-09-14 PROCEDURE — 6370000000 HC RX 637 (ALT 250 FOR IP): Performed by: PHYSICIAN ASSISTANT

## 2021-09-14 PROCEDURE — 94761 N-INVAS EAR/PLS OXIMETRY MLT: CPT

## 2021-09-14 PROCEDURE — 83735 ASSAY OF MAGNESIUM: CPT

## 2021-09-14 PROCEDURE — 2580000003 HC RX 258: Performed by: PHYSICIAN ASSISTANT

## 2021-09-14 PROCEDURE — 97110 THERAPEUTIC EXERCISES: CPT

## 2021-09-14 PROCEDURE — 97530 THERAPEUTIC ACTIVITIES: CPT

## 2021-09-14 PROCEDURE — 6370000000 HC RX 637 (ALT 250 FOR IP): Performed by: THORACIC SURGERY (CARDIOTHORACIC VASCULAR SURGERY)

## 2021-09-14 PROCEDURE — 93010 ELECTROCARDIOGRAM REPORT: CPT | Performed by: INTERNAL MEDICINE

## 2021-09-14 PROCEDURE — 6370000000 HC RX 637 (ALT 250 FOR IP): Performed by: INTERNAL MEDICINE

## 2021-09-14 PROCEDURE — 97116 GAIT TRAINING THERAPY: CPT

## 2021-09-14 PROCEDURE — 6360000002 HC RX W HCPCS: Performed by: THORACIC SURGERY (CARDIOTHORACIC VASCULAR SURGERY)

## 2021-09-14 PROCEDURE — 82962 GLUCOSE BLOOD TEST: CPT

## 2021-09-14 PROCEDURE — 71046 X-RAY EXAM CHEST 2 VIEWS: CPT

## 2021-09-14 PROCEDURE — 80048 BASIC METABOLIC PNL TOTAL CA: CPT

## 2021-09-14 PROCEDURE — 82330 ASSAY OF CALCIUM: CPT

## 2021-09-14 PROCEDURE — 6360000002 HC RX W HCPCS: Performed by: PHYSICIAN ASSISTANT

## 2021-09-14 RX ORDER — HYDROCODONE BITARTRATE AND ACETAMINOPHEN 5; 325 MG/1; MG/1
1 TABLET ORAL EVERY 8 HOURS PRN
Qty: 10 TABLET | Refills: 0 | Status: SHIPPED | OUTPATIENT
Start: 2021-09-14 | End: 2021-09-17

## 2021-09-14 RX ORDER — CARVEDILOL 3.12 MG/1
3.12 TABLET ORAL 2 TIMES DAILY
Qty: 60 TABLET | Refills: 3 | Status: SHIPPED | OUTPATIENT
Start: 2021-09-14 | End: 2021-09-20 | Stop reason: ALTCHOICE

## 2021-09-14 RX ORDER — ALBUTEROL SULFATE 90 UG/1
2 AEROSOL, METERED RESPIRATORY (INHALATION) EVERY 4 HOURS PRN
Status: DISCONTINUED | OUTPATIENT
Start: 2021-09-14 | End: 2021-09-14 | Stop reason: HOSPADM

## 2021-09-14 RX ORDER — ALOGLIPTIN 12.5 MG/1
25 TABLET, FILM COATED ORAL DAILY
Status: DISCONTINUED | OUTPATIENT
Start: 2021-09-14 | End: 2021-09-14 | Stop reason: HOSPADM

## 2021-09-14 RX ORDER — POTASSIUM CHLORIDE 20 MEQ/1
20 TABLET, EXTENDED RELEASE ORAL 2 TIMES DAILY
Qty: 60 TABLET | Refills: 0 | Status: SHIPPED | OUTPATIENT
Start: 2021-09-14 | End: 2021-11-02

## 2021-09-14 RX ORDER — GLIPIZIDE 5 MG/1
10 TABLET ORAL
Status: DISCONTINUED | OUTPATIENT
Start: 2021-09-14 | End: 2021-09-14 | Stop reason: HOSPADM

## 2021-09-14 RX ORDER — FUROSEMIDE 20 MG/1
20 TABLET ORAL 2 TIMES DAILY
Qty: 60 TABLET | Refills: 1 | Status: SHIPPED | OUTPATIENT
Start: 2021-09-14 | End: 2021-11-22

## 2021-09-14 RX ADMIN — GLIPIZIDE 10 MG: 5 TABLET ORAL at 17:44

## 2021-09-14 RX ADMIN — SENNOSIDES AND DOCUSATE SODIUM 1 TABLET: 50; 8.6 TABLET ORAL at 08:13

## 2021-09-14 RX ADMIN — HYDROCODONE BITARTRATE AND ACETAMINOPHEN 1 TABLET: 5; 325 TABLET ORAL at 03:32

## 2021-09-14 RX ADMIN — ACETAMINOPHEN 650 MG: 325 TABLET ORAL at 12:07

## 2021-09-14 RX ADMIN — ALOGLIPTIN 25 MG: 12.5 TABLET, FILM COATED ORAL at 08:22

## 2021-09-14 RX ADMIN — ACETAMINOPHEN 650 MG: 325 TABLET ORAL at 06:50

## 2021-09-14 RX ADMIN — CALCIUM GLUCONATE 2000 MG: 20 INJECTION, SOLUTION INTRAVENOUS at 08:23

## 2021-09-14 RX ADMIN — ASPIRIN 81 MG: 81 TABLET, COATED ORAL at 08:13

## 2021-09-14 RX ADMIN — BISACODYL 10 MG: 10 SUPPOSITORY RECTAL at 08:12

## 2021-09-14 RX ADMIN — METFORMIN HYDROCHLORIDE 850 MG: 850 TABLET ORAL at 08:19

## 2021-09-14 RX ADMIN — PANTOPRAZOLE SODIUM 40 MG: 40 TABLET, DELAYED RELEASE ORAL at 08:13

## 2021-09-14 RX ADMIN — THERA TABS 1 TABLET: TAB at 08:13

## 2021-09-14 RX ADMIN — MAGNESIUM SULFATE HEPTAHYDRATE 2000 MG: 40 INJECTION, SOLUTION INTRAVENOUS at 08:28

## 2021-09-14 RX ADMIN — Medication: at 08:13

## 2021-09-14 RX ADMIN — CARVEDILOL 3.12 MG: 3.12 TABLET, FILM COATED ORAL at 08:13

## 2021-09-14 RX ADMIN — HYDROCORTISONE: 1 CREAM TOPICAL at 08:31

## 2021-09-14 RX ADMIN — METFORMIN HYDROCHLORIDE 850 MG: 850 TABLET ORAL at 17:44

## 2021-09-14 RX ADMIN — AMIODARONE HYDROCHLORIDE 200 MG: 200 TABLET ORAL at 08:12

## 2021-09-14 RX ADMIN — TAMSULOSIN HYDROCHLORIDE 0.8 MG: 0.4 CAPSULE ORAL at 08:13

## 2021-09-14 RX ADMIN — FUROSEMIDE 40 MG: 10 INJECTION, SOLUTION INTRAMUSCULAR; INTRAVENOUS at 08:14

## 2021-09-14 RX ADMIN — ACETAMINOPHEN 325 MG: 325 TABLET ORAL at 03:33

## 2021-09-14 RX ADMIN — SODIUM CHLORIDE, PRESERVATIVE FREE 10 ML: 5 INJECTION INTRAVENOUS at 08:14

## 2021-09-14 ASSESSMENT — PAIN - FUNCTIONAL ASSESSMENT
PAIN_FUNCTIONAL_ASSESSMENT: PREVENTS OR INTERFERES SOME ACTIVE ACTIVITIES AND ADLS
PAIN_FUNCTIONAL_ASSESSMENT: PREVENTS OR INTERFERES SOME ACTIVE ACTIVITIES AND ADLS

## 2021-09-14 ASSESSMENT — PAIN DESCRIPTION - ONSET
ONSET: AWAKENED FROM SLEEP
ONSET: ON-GOING

## 2021-09-14 ASSESSMENT — PAIN DESCRIPTION - DESCRIPTORS
DESCRIPTORS: ACHING
DESCRIPTORS: ACHING

## 2021-09-14 ASSESSMENT — PAIN SCALES - GENERAL
PAINLEVEL_OUTOF10: 1
PAINLEVEL_OUTOF10: 3
PAINLEVEL_OUTOF10: 2
PAINLEVEL_OUTOF10: 5
PAINLEVEL_OUTOF10: 2
PAINLEVEL_OUTOF10: 4

## 2021-09-14 ASSESSMENT — PAIN DESCRIPTION - PAIN TYPE
TYPE: CHRONIC PAIN;SURGICAL PAIN
TYPE: ACUTE PAIN;SURGICAL PAIN

## 2021-09-14 ASSESSMENT — PAIN DESCRIPTION - FREQUENCY
FREQUENCY: INTERMITTENT
FREQUENCY: CONTINUOUS

## 2021-09-14 ASSESSMENT — PAIN DESCRIPTION - LOCATION
LOCATION: BACK;CHEST
LOCATION: CHEST;STERNUM

## 2021-09-14 ASSESSMENT — PAIN DESCRIPTION - ORIENTATION: ORIENTATION: MID

## 2021-09-14 ASSESSMENT — PAIN DESCRIPTION - PROGRESSION
CLINICAL_PROGRESSION: GRADUALLY WORSENING
CLINICAL_PROGRESSION: GRADUALLY WORSENING

## 2021-09-14 NOTE — DISCHARGE INSTR - ACTIVITY
activity as tolerated and no lifting, Driving, or Strenuous exercise until seen by physician in office

## 2021-09-14 NOTE — PLAN OF CARE
Problem: Pain:  Description: Pain management should include both nonpharmacologic and pharmacologic interventions. Goal: Pain level will decrease  Description: Pain level will decrease  9/14/2021 0740 by Madhav Clark RN  Outcome: Ongoing  9/13/2021 2109 by Edna Ferrer RN  Outcome: Ongoing  Goal: Control of acute pain  Description: Control of acute pain  9/14/2021 0740 by Madhav Clark RN  Outcome: Ongoing  9/13/2021 2109 by Edna Ferrer RN  Outcome: Ongoing  Goal: Control of chronic pain  Description: Control of chronic pain  9/14/2021 0740 by Madhav Clark RN  Outcome: Ongoing  9/13/2021 2109 by Edna Ferrer RN  Outcome: Ongoing  Goal: Patient's pain/discomfort is manageable  Description: Patient's pain/discomfort is manageable  9/14/2021 0740 by Madhav Clark RN  Outcome: Ongoing  9/13/2021 2109 by Edna Ferrer RN  Outcome: Ongoing     Problem: Skin Integrity:  Goal: Will show no infection signs and symptoms  Description: Will show no infection signs and symptoms  9/14/2021 0740 by Madhav Clark RN  Outcome: Ongoing  9/13/2021 2109 by Edna Ferrer RN  Outcome: Ongoing  Goal: Absence of new skin breakdown  Description: Absence of new skin breakdown  9/14/2021 0740 by Madhav Clark RN  Outcome: Ongoing  9/13/2021 2109 by Edna Ferrer RN  Outcome: Ongoing     Problem: Discharge Planning:  Goal: Discharged to appropriate level of care  Description: Discharged to appropriate level of care  9/14/2021 0740 by Madhav Clark RN  Outcome: Ongoing  9/13/2021 2109 by Edna Ferrer RN  Outcome: Ongoing     Problem: Activity Intolerance:  Goal: Able to perform prescribed physical activity  Description: Able to perform prescribed physical activity  9/14/2021 0740 by Madhav Clark RN  Outcome: Ongoing  9/13/2021 2109 by Edna Ferrer RN  Outcome: Ongoing  Goal: Ability to tolerate increased activity will improve  Description: Ability to tolerate increased activity will improve  9/14/2021 0740 by Ismael Denzil Hodgkins, RN  Outcome: Ongoing  9/13/2021 2109 by Mónica Carrasco. MALIHA Ferrer  Outcome: Ongoing     Problem: Anxiety:  Goal: Level of anxiety will decrease  Description: Level of anxiety will decrease  9/14/2021 0740 by Oneal Apley, RN  Outcome: Ongoing  9/13/2021 2109 by Mónica Carrasco. MALIHA Ferrer  Outcome: Ongoing     Problem: Cardiac Output - Decreased:  Goal: Cardiac output within specified parameters  Description: Cardiac output within specified parameters  9/14/2021 0740 by Oneal Apley, RN  Outcome: Ongoing  9/13/2021 2109 by Mónica Carrasco. MALIHA Ferrer  Outcome: Ongoing  Goal: Hemodynamic stability will improve  Description: Hemodynamic stability will improve  9/14/2021 0740 by Oneal Apley, RN  Outcome: Ongoing  9/13/2021 2109 by Mónica Carrasco. MALIHA Ferrer  Outcome: Ongoing     Problem: Fluid Volume - Imbalance:  Goal: Ability to achieve a balanced intake and output will improve  Description: Ability to achieve a balanced intake and output will improve  9/14/2021 0740 by Oneal Apley, RN  Outcome: Ongoing  9/13/2021 2109 by Mónica Carrasco. MALIHA Ferrer  Outcome: Ongoing  Goal: Chest tube drainage is within specified parameters  Description: Chest tube drainage is within specified parameters  9/14/2021 0740 by Oneal Apley, RN  Outcome: Ongoing  9/13/2021 2109 by Mónica Carrasco. MALIHA Ferrer  Outcome: Ongoing     Problem: Gas Exchange - Impaired:  Goal: Levels of oxygenation will improve  Description: Levels of oxygenation will improve  9/14/2021 0740 by Oneal Apley, RN  Outcome: Ongoing  9/13/2021 2109 by Mónica Carrasco. MALIHA Ferrer  Outcome: Ongoing  Goal: Ability to maintain adequate ventilation will improve  Description: Ability to maintain adequate ventilation will improve  9/14/2021 0740 by Oneal Apley, RN  Outcome: Ongoing  9/13/2021 2109 by Mónica Carrasco. MALIHA Ferrer  Outcome: Ongoing     Problem: Pain:  Description: Pain management should include both nonpharmacologic and pharmacologic interventions.   Goal: Pain level will decrease  Description: Pain level will decrease  9/14/2021 0740 by Olesya Monroy RN  Outcome: Ongoing  9/13/2021 2109 by Saud Ferrer RN  Outcome: Ongoing  Goal: Control of acute pain  Description: Control of acute pain  9/14/2021 0740 by Olesya Monroy RN  Outcome: Ongoing  9/13/2021 2109 by Saud Ferrer RN  Outcome: Ongoing  Goal: Control of chronic pain  Description: Control of chronic pain  9/14/2021 0740 by Olesya Monroy RN  Outcome: Ongoing  9/13/2021 2109 by Saud Ferrer RN  Outcome: Ongoing     Problem: Tissue Perfusion - Cardiopulmonary, Altered:  Goal: Hemodynamic stability will improve  Description: Hemodynamic stability will improve  9/14/2021 0740 by Olesya Monroy RN  Outcome: Ongoing  9/13/2021 2109 by Saud Ferrer RN  Outcome: Ongoing  Goal: Absence of angina  Description: Absence of angina  9/14/2021 0740 by Olesya Monroy RN  Outcome: Ongoing  9/13/2021 2109 by Saud Ferrer RN  Outcome: Ongoing  Goal: Hemodynamic stability will improve  Description: Hemodynamic stability will improve  9/14/2021 0740 by Olesya Monroy RN  Outcome: Ongoing  9/13/2021 2109 by Saud Ferrer RN  Outcome: Ongoing  Goal: Will show no evidence of cardiac arrhythmias  Description: Will show no evidence of cardiac arrhythmias  9/14/2021 0740 by Olesya Monroy RN  Outcome: Ongoing  9/13/2021 2109 by Saud Ferrer RN  Outcome: Ongoing     Problem: Tobacco Use:  Goal: Will participate in inpatient tobacco-use cessation counseling  Description: Will participate in inpatient tobacco-use cessation counseling  9/14/2021 0740 by Olesya Monroy RN  Outcome: Ongoing  9/13/2021 2109 by Saud Ferrer RN  Outcome: Ongoing     Problem: Falls - Risk of:  Goal: Will remain free from falls  Description: Will remain free from falls  9/14/2021 0740 by Olesya Monroy RN  Outcome: Ongoing  9/13/2021 2109 by Saud Ferrer RN  Outcome: Ongoing  Goal: Absence of physical injury  Description: Absence of physical injury  9/14/2021 0740 by Olesya Monroy, RN  Outcome:

## 2021-09-14 NOTE — CONSULTS
Endocrinology   Consult Note      Dear Doctor Denise Gonzalez     Thank You for the Consult     Pt. Was Admitted for :    Reason for Consult:  Better control; of blood glucose     History Obtained From:  Patient/ EMR       HISTORY OF PRESENT ILLNESS:                The patient is a 78 y.o. male with significant past medical history of CAD, bladder cancer diabetes mellitus pretension, hyperlipidemia, history of psoriasis was admitted to hospital for chest pain, CAD underwent CABG days ago today's postop day 3  I was  consulted for better control of blood glucose. ROS:   Pt's ROS done in detail. Abnormal ROS are noted in Medical and Surgical History Section below: Other Medical History:        Diagnosis Date    Abnormal Heart Score CT 03/04/2005    Arthritis     CAD (coronary artery disease)     Cancer ([facility] [address] 2019 and 2020    Bladder    Diabetes mellitus ([facility] [address]     Elevated d-dimer 01/16/2020    Fatigue 06/18/2018    H/O echocardiogram 01/27/2020    EF 85-52, Grade I diastolic dysfunction.     History of exercise stress test 01/27/2020    Treadmill, normal stress test, THR achieved    History of nuclear stress test 08/23/2021    EF 55%, ETT negative for ischemia/arrhythmia, Medium sized area of moderate inferior wall ischemia    Hyperlipidemia     Hypertension     Obesity 02/15/2000    Psoriasis 03/09/2004     Surgical History:        Procedure Laterality Date    BLADDER SURGERY      remove lesion 2019 and 2020    CHOLECYSTECTOMY  1999    COLONOSCOPY  10/22/2003    polyp x1    COLONOSCOPY  2010    Normal exam    COLONOSCOPY  12/05/2017    single 2mm polyp found in proximal ascending colon, diverticulosis found in sigmoid colon    CYST INCISION AND DRAINAGE Right 2013    FRACTURE SURGERY Right 1969    tibia    FRACTURE SURGERY Left 1994    shoulder    FRACTURE SURGERY  2011    Nose & facial bones    HERNIA REPAIR  0493    Umbilical       Allergies:  Clarithromycin, Levaquin [levofloxacin], and Bicillin [penicillin g benzathine]    Family History:       Problem Relation Age of Onset    Stroke Mother     Diabetes Father     Diabetes Brother     Diabetes Paternal Grandmother     Diabetes Brother     Diabetes Brother      REVIEW OF SYSTEMS:  Review of System Done as noted above     PHYSICAL EXAM:      Vitals:    BP (!) 126/113   Pulse 90   Temp 98.3 °F (36.8 °C) (Oral)   Resp 22   Ht 5' 8\" (1.727 m)   Wt (S) 186 lb (84.4 kg)   SpO2 100%   BMI 28.28 kg/m²     CONSTITUTIONAL:  awake, alert, cooperative, appears stated age   EYES:  vision intact Fundoscopic Exam not performed   ENT:Normal  NECK:  Supple, No JVD. Thyroid Exam:Normal   LUNGS:  Has Vesicular Breath Sounds,   CARDIOVASCULAR:  Normal apical impulse, regular rate and rhythm, normal S1 and S2, no S3 or S4, and has no  murmur   ABDOMEN:  No scars, normal bowel sounds, soft, non-distended, non-tender, no masses palpated, no hepatolienomegaly  Musculoskeletal: Normal  Extremities: Normal, peripheral pulses normal, , has no edema   NEUROLOGIC:  Awake, alert, oriented to name, place and time. Cranial nerves II-XII are grossly intact. Motor is  intact. Sensory is intact.  ,  and gait is normal.    DATA:    CBC:   Recent Labs     09/11/21  0500 09/12/21  0455 09/13/21  0452   WBC 8.9 7.5 7.3   HGB 8.7* 8.2* 8.5*   PLT 94* 70* 94*    CMP:  Recent Labs     09/11/21  0500 09/12/21  0455 09/13/21  0452    135 136   K 4.4 3.9 3.4*    103 101   CO2 21 26 24   BUN 13 12 13   CREATININE 0.8* 0.8* 0.8*   CALCIUM 7.8* 8.1* 7.9*     Lipids:   Lab Results   Component Value Date    CHOL 120 08/31/2021    HDL 41 08/31/2021    TRIG 105 08/31/2021     Glucose:   Recent Labs     09/13/21  0912 09/13/21  1311 09/13/21  1837   POCGLU 121* 282* 266*     Hemoglobin A1C:   Lab Results   Component Value Date    LABA1C 7.5 09/08/2021     Free T4: No results found for: T4FREE  Free T3: No results found for: FT3  TSH High Sensitivity: No results found for: [facility]    Echocardiogram complete 2D with doppler with color    Result Date: 9/8/2021  Transthoracic Echocardiography Report (TTE)  Demographics   Patient Name       Camden Hood Date of Study       09/08/2021                     E   Date of Birth      1945         Gender              Male   Age                78 year(s)         Race                   Patient Number     [id number]         Room Number         CATH   Visit Number       [id number]   Corporate ID       [id number]   Accession Number   [id number]         J Carlos Merritt RN   Ordering Physician Reena Eubanks MD     Interpreting        Jenna Richardson MD  Procedure Type of Study   TTE procedure:ECHOCARDIOGRAM COMPLETE 2D W DOPPLER W COLOR. Procedure Date Date: 09/08/2021 Start: 01:08 PM Study Location: Portable Indications:Chest pain. Patient Status: Routine Height: 68 inches Weight: 180 pounds BSA: 1.95 m2 BMI: 27.37 kg/m2 HR: 64 bpm BP: 153/74 mmHg  Conclusions   Summary  Left ventricular systolic function is normal.  Ejection fraction is visually estimated at 50-55%. Mild left ventricular hypertrophy. Indeterminate diastolic function; E/A flow reversal is noted. Sclerotic, but non-stenotic aortic valve. No evidence of any pericardial effusion. Signature   ------------------------------------------------------------------  Electronically signed by Jenna Solares MD (Interpreting  physician) on 09/08/2021 at 02:53 PM   V E' Septal                                       TR Velocity:196 cm/s  Velocity: 5.48 cm/s                                TR Gradient:15.37 mmHg  MV E' Lateral  Velocity: 4.72 cm/s  MV E/E' septal: 10  MV E/E' lateral:  11.61      CT CHEST WO CONTRAST    Result Date: 9/9/2021  EXAMINATION: CT OF THE CHEST WITHOUT CONTRAST 9/8/2021 3:36 pm     1.  No acute cardiopulmonary process identified. 2. Severe coronary artery atherosclerosis. 3. Mild centrilobular emphysematous changes. 4. Two right upper lobe pulmonary nodules measure up to 4 mm. Please see follow-up recommendations below. 5. Splenomegaly of uncertain etiology. RECOMMENDATIONS: Multiple pulmonary nodules. Most severe: 4 mm right solid pulmonary nodule within the upper lobe. A non-contrast Chest CT at 12 months is optional. If performed and the nodule is stable at 12 months, no further follow-up is recommended. These guidelines do not apply to immunocompromised patients and patients with cancer. Follow up in patients with significant comorbidities as clinically warranted. For lung cancer screening, adhere to Lung-RADS guidelines. Reference: Radiology. 2017; 284(1):228-43. XR CHEST PORTABLE    Result Date: 9/12/2021  EXAMINATION: ONE XRAY VIEW OF THE CHEST 9/12/2021 7:28 pm COMPARISON: Imaging from earlier the same day. HISTORY: ORDERING SYSTEM PROVIDED HISTORY: post chest tube removal TECHNOLOGIST PROVIDED HISTORY: Reason for exam:->post chest tube removal Reason for Exam: post chest tube removal Acuity: Acute Type of Exam: Initial Mechanism of Injury: post chest tube removal Relevant Medical/Surgical History: post chest tube removal FINDINGS: Chest tubes have been removed. There is no pneumothorax. The left subclavian catheter tip is in the superior vena cava. There has been coronary bypass grafting. Heart size is normal.  The lungs are clear. No pneumothorax following chest tube removal.     XR CHEST PORTABLE    Result Date: 9/12/2021  EXAMINATION: ONE XRAY VIEW OF THE CHEST 9/12/2021 5:55 am COMPARISON: Chest radiograph 09/11/2021. CT chest 09/08/2021. HISTORY: ORDERING SYSTEM PROVIDED HISTORY: Post op open heart surgery TECHNOLOGIST PROVIDED HISTORY: Reason for exam:->Post op open heart surgery FINDINGS: Single view provided. Mild rotation. Stable mediastinal and cardiac silhouettes.   Interval [place]-Brielle removal with stable left subclavian CVC with tip in the superior vena cava. Stable right hemidiaphragm elevation and right lung base subsegmental opacities. Bilateral chest tubes in place. No large effusion or pneumothorax. No free subdiaphragmatic air. Stable right lung base pulmonary opacities. No significant effusion or pneumothorax. XR CHEST PORTABLE    Result Date: 9/11/2021  EXAMINATION: ONE XRAY VIEW OF THE CHEST 9/11/2021 4:12 am COMPARISON: September 10, 2021 HISTORY: ORDERING SYSTEM PROVIDED HISTORY: Post op open heart surgery TECHNOLOGIST PROVIDED HISTORY: Reason for exam:->Post op open heart surgery FINDINGS: There is a left subclavian catheter with its tip at the superior cavoatrial junction. There is a left subclavian Anderson-Brielle catheter, stable. There is stable chest tubes. The cardiomediastinal silhouette is stable. There is discoid atelectasis at the bilateral lung bases. There is mild pulmonary vascular congestion. There is no pleural effusion. There is no pneumothorax. There is no acute osseous abnormality. Mild pulmonary vascular congestion. Discoid atelectasis at the bilateral lung bases. Stable chest tubes. No definite pneumothorax. XR CHEST PORTABLE    Result Date: 9/10/2021  EXAMINATION: ONE XRAY VIEW OF THE CHEST 9/10/2021 12:00 pm COMPARISON: 09/02/2021 HISTORY: ORDERING SYSTEM PROVIDED HISTORY: Post op open heart surgery TECHNOLOGIST PROVIDED HISTORY: Reason for exam:->Post op open heart surgery Reason for Exam: Post op open heart surgery Acuity: Acute Type of Exam: Initial FINDINGS: Status post median sternotomy. Endotracheal tube in satisfactory position above the kortney. NG tip near GE junction. Anderson-Rbielle catheter tip in main pulmonary artery. Left chest tube in place. No pneumothorax. Bibasilar hypoaeration with subsegmental atelectatic changes.      Bibasilar hypoaeration and subsegmental atelectatic changes NG tube tip near GE junction No pneumothorax     VL DUP CAROTID BILATERAL    Result Date: 9/9/2021  EXAMINATION: ULTRASOUND EVALUATION OF THE CAROTID ARTERIES 9/9/202   normal antegrade flow. No evidence of focal atherosclerotic plaque. ICA/CCA ratio of 0.7. The right internal carotid artery demonstrates 0-50% stenosis. The left internal carotid artery demonstrates 0-50% stenosis. Bilateral vertebral arteries are patent with flow in the normal direction. US LOWER EXTREMITY UNILATERAL VEIN MAPPING    Result Date: 9/9/2021  EXAMINATION: ULTRASOUND OF THE BILATERAL LEFT LOWER EXTREMITY FOR VEIN MAPPING, 9/9/2021 9:55 am TECHNIQUE: Sonographic evaluation of the greater saphenous vein was performed. Color flow Doppler imaging was also acquired. COMPARISON: None. HISTORY: ORDERING SYSTEM PROVIDED HISTORY: Pre-testing TECHNOLOGIST PROVIDED HISTORY: Surgery 9/10/21 Reason for exam:->Pre-testing Reason for Exam: pre op Acuity: Unknown Type of Exam: Initial FINDINGS: Sapheno-femoral junction: 7.3mm. Proximal thigh:  4.7mm. Mid thigh:  4.3mm. Distal thigh:  4.4mm. Knee:  3.5mm. Proximal calf: 3.8mm. Mid calf:  6.1mm. Ankle:  3.5mm. Greater saphenous vein is patent with measurements ranging from 3.5 to 7.3mm as discussed above.        Scheduled Medicines   Medications:    furosemide  40 mg IntraVENous BID    bisacodyl  10 mg Rectal Daily    [START ON 9/14/2021] insulin lispro  0-12 Units SubCUTAneous TID     [START ON 9/14/2021] glipiZIDE  5 mg Oral BID AC    [START ON 9/14/2021] metFORMIN  500 mg Oral BID     insulin lispro  0-12 Units SubCUTAneous 2 times per day    tamsulosin  0.8 mg Oral Daily    sodium chloride flush  10 mL IntraVENous 2 times per day    aspirin  81 mg Oral Daily    amiodarone  200 mg Oral TID    mupirocin   Nasal BID    multivitamin  1 tablet Oral Daily with breakfast    sennosides-docusate sodium  1 tablet Oral Daily    carvedilol  3.125 mg Oral BID    atorvastatin  20 mg Oral Nightly    pantoprazole  40 mg Oral Daily    albuterol sulfate HFA  2 puff Inhalation 4x daily    ipratropium  2 puff Inhalation 4x daily    [START ON 9/15/2021] amiodarone  200 mg Oral Daily    hydrocortisone   Topical BID      Infusions:    dextrose      sodium chloride      norepinephrine      EPINEPHrine Stopped (09/10/21 1626)    nitroGLYCERIN           IMPRESSION    Patient Active Problem List   Diagnosis    Chest pain    Syncope    Diabetes mellitus ([facility] [address]    Hyperlipidemia    Hypertension    CAD in native artery         RECOMMENDATIONS:      1. Reviewed POC blood glucose . Labs and X ray results   2. Reviewed Home and Current Medicines   3. Will Start On meal/ Correction bolus Humalog/Insulin regime / OHGD   4. Monitor Blood glucose frequently   5. Modify  the dose of Insulin/ OHGD  as needed        Will follow with you  Again thank you for sharing pt's care with me.      Truly yours,       Kwesi Patel MD

## 2021-09-14 NOTE — FLOWSHEET NOTE
Teresita Mcburney PA rounding, notified of patient becoming extremely tired this morning after sitting on toilet multiple times for BM, did finally have results. Aware patient reported dizziness when up. Aware of SBP dropping from 160-170s this morning to 88-JXC 160V systolic. Aware VSS, patient resting quietly at this time.  Skin color is noted to be pale at this time     Vikas Funez RN 12:10 PM

## 2021-09-14 NOTE — PROGRESS NOTES
Progress Note( Dr. Rosalio Baker)  9/14/2021  Subjective:   Admit Date: 9/10/2021  PCP: Saeid Ahmadi MD    Admitted For :    Consulted For:     Interval History:    Denies any chest pains,   Denies SOB . Denies nausea or vomiting. No new bowel or bladder symptoms.        Intake/Output Summary (Last 24 hours) at 9/14/2021 0742  Last data filed at 9/14/2021 0600  Gross per 24 hour   Intake 1160 ml   Output 2900 ml   Net -1740 ml       DATA    CBC:   Recent Labs     09/12/21  0455 09/13/21  0452   WBC 7.5 7.3   HGB 8.2* 8.5*   PLT 70* 94*    CMP:  Recent Labs     09/12/21  0455 09/13/21  0452 09/14/21  0530    136 138   K 3.9 3.4* 3.9    101 102   CO2 26 24 26   BUN 12 13 14   CREATININE 0.8* 0.8* 0.8*   CALCIUM 8.1* 7.9* 8.4     Lipids:   Lab Results   Component Value Date    CHOL 120 08/31/2021    HDL 41 08/31/2021    TRIG 105 08/31/2021     Glucose:  Recent Labs     09/13/21  1311 09/13/21  1837 09/13/21 2009   POCGLU 282* 266* 328*     XhpiuseevlA4O:  Lab Results   Component Value Date    LABA1C 7.5 09/08/2021     High Sensitivity TSH: No results found for: TSHHS  Free T3: No results found for: FT3  Free T4:No results found for: T4FREE    Echocardiogram complete 2D with doppler with color    Result Date: 9/8/2021  Transthoracic Echocardiography Report (TTE)  Demographics   Patient Name       Ana Paula Beach Date of Study       09/08/2021                     E   Date of Birth      1945         Gender              Male   Age                78 year(s)         Race                   Patient Number     [id number]         Room Number         CATH   Visit Number       [id number]   Corporate ID       [id number]   Accession Number   [id number]         Paco Portillo RN   Ordering Physician Claudia Lamar MD     Interpreting        Abraham Nat                                        Physician           MD Procedure Type of Study   TTE procedure:ECHOCARDIOGRAM COMPLETE 2D W DOPPLER W COLOR. Procedure Date Date: 09/08/2021 Start: 01:08 PM Study Location: Portable Indications:Chest pain. Patient Status: Routine Height: 68 inches Weight: 180 pounds BSA: 1.95 m2 BMI: 27.37 kg/m2 HR: 64 bpm BP: 153/74 mmHg  Conclusions   Summary  Left ventricular systolic function is normal.  Ejection fraction is visually estimated at 50-55%. Mild left ventricular hypertrophy. Indeterminate diastolic function; E/A flow reversal is noted. Sclerotic, but non-stenotic aortic valve. No evidence of any pericardial effusion. Signature   ------------------------------------------------------------------  Electronically signed by Pascual Darden MD (Interpreting  physician) on 09/08/2021 at 02:53 PM  ------------------------------------------------------------------   Findings   Left Ventricle  Left ventricular systolic function is normal.  Ejection fraction is visually estimated at 50-55%. Mild left ventricular hypertrophy. Indeterminate diastolic function; E/A flow reversal is noted. Left ventricle size is normal.  No regional wall motion abnormalites. Left Atrium  Essentially normal left atrium. Right Atrium  Essentially normal right atrium. Right Ventricle  Essentially normal right ventricle. Aortic Valve  Sclerotic, but non-stenotic aortic valve. Mitral Valve  Mild mitral regurgitation is present. Tricuspid Valve  Trace tricuspid regurgitation is present. RVSP is 18 mmHg. Pulmonic Valve  Trace pulmonic regurgitation present. Pericardial Effusion  No evidence of any pericardial effusion. Miscellaneous  The aorta is within normal limits.   M-Mode/2D Measurements & Calculations   LV Diastolic Dimension:  LV Systolic Dimension:  AV Cusp Separation: 1 cmLA  5.38 cm                  3.04 cm                 Dimension: 5.4 cmAO Root  LV FS:43.5 %             LV Volume Diastolic: 79 Dimension: 3.1 cmLA Area:  LV PW Diastolic: 9.93 cm ml                      17.2 cm2  LV PW Systolic: 5.10 cm  LV Volume Systolic: 32  Septum Diastolic: 1.2 cm ml  Septum Systolic: 2.35 cm LV EDV/LV EDV Index: 79  CO: 4.48 l/min           ml/41 m2LV ESV/LV ESV   RV Diastolic Dimension: 3.4  CI: 2.3 l/m*m2           Index: 32 ml/16 m2      cm                           EF Calculated (A4C):  LV Area Diastolic: 27    47.8 %                  LA/Aorta: 1.74  cm2                      EF Calculated (2D):     Ascending Aorta: 3.2 cm  LV Area Systolic: 61.9   42.6 %                  LA volume/Index: 50 ml  cm2                                              /26m2                           LV Length: 7.58 cm                            LVOT: 2.1 cm  Doppler Measurements & Calculations   MV Peak E-Wave: 54.8 AV Peak Velocity: 168 cm/s    LVOT Peak Velocity: 80.5  cm/s                 AV Peak Gradient: 11.29 mmHg  cm/s  MV Peak A-Wave: 84.4 AV Mean Velocity: 117 cm/s    LVOT Mean Velocity: 54.1  cm/s                 AV Mean Gradient: 6 mmHg      cm/s  MV E/A Ratio: 0.65   AV VTI: 35.5 cm               LVOT Peak Gradient: 3  MV Peak Gradient:    AV Area (Continuity):1.97 cm2 mmHgLVOT Mean Gradient: 1  1.2 mmHg                                           mmHg                       LVOT VTI: 20.2 cm             Estimated RVSP: 18 mmHg  MV P1/2t: 51 msec                                  Estimated RAP:3 mmHg  MVA by PHT:4.31 cm2  Estimated PASP: 18.37 mmHg   MV E' Septal                                       TR Velocity:196 cm/s  Velocity: 5.48 cm/s                                TR Gradient:15.37 mmHg  MV E' Lateral  Velocity: 4.72 cm/s  MV E/E' septal: 10  MV E/E' lateral:  11.61      CT CHEST WO CONTRAST    Result Date: 9/9/2021  EXAMINATION: CT OF THE CHEST WITHOUT CONTRAST 9/8/2021 3:36 pm TECHNIQUE: CT of the chest was performed without the administration of intravenous contrast. Multiplanar reformatted images are provided for review.  Dose modulation, iterative reconstruction, and/or weight based adjustment of the mA/kV was utilized to reduce the radiation dose to as low as reasonably achievable. COMPARISON: None. HISTORY: ORDERING SYSTEM PROVIDED HISTORY: pre-op cabg TECHNOLOGIST PROVIDED HISTORY: Reason for exam:->pre-op cabg Reason for Exam: pre-op cabg Acuity: Acute Type of Exam: Initial Relevant Medical/Surgical History: hx HTN, jessika FINDINGS: Mediastinum: There is no pathologically enlarged lymphadenopathy present. The airways are normal in appearance. Severe coronary artery atherosclerotic calcifications are present. Lungs/pleura: Mild centrilobular emphysematous changes are present within the upper lobes. There is a 4 x 3 x 4 mm solid right upper lobe pulmonary nodule. There is an adjacent 3 x 3 mm solid right upper lobe pulmonary nodule. There is no focal airspace consolidation. There is no pleural effusion or pneumothorax. Upper Abdomen: Spleen is enlarged measuring 15 cm in AP dimension. Limited evaluation the upper abdomen is otherwise unremarkable. Soft Tissues/Bones: No lytic or blastic osseous lesions are identified. 1. No acute cardiopulmonary process identified. 2. Severe coronary artery atherosclerosis. 3. Mild centrilobular emphysematous changes. 4. Two right upper lobe pulmonary nodules measure up to 4 mm. Please see follow-up recommendations below. 5. Splenomegaly of uncertain etiology. RECOMMENDATIONS: Multiple pulmonary nodules. Most severe: 4 mm right solid pulmonary nodule within the upper lobe. A non-contrast Chest CT at 12 months is optional. If performed and the nodule is stable at 12 months, no further follow-up is recommended. These guidelines do not apply to immunocompromised patients and patients with cancer. Follow up in patients with significant comorbidities as clinically warranted. For lung cancer screening, adhere to Lung-RADS guidelines. Reference: Radiology. 2017; 284(1):228-43.      XR CHEST PORTABLE    Result Date: 9/12/2021  EXAMINATION: ONE XRAY VIEW OF THE CHEST 9/12/2021 7:28 pm COMPARISON: Imaging from earlier the same day. HISTORY: ORDERING SYSTEM PROVIDED HISTORY: post chest tube removal TECHNOLOGIST PROVIDED HISTORY: Reason for exam:->post chest tube removal Reason for Exam: post chest tube removal Acuity: Acute Type of Exam: Initial Mechanism of Injury: post chest tube removal Relevant Medical/Surgical History: post chest tube removal FINDINGS: Chest tubes have been removed. There is no pneumothorax. The left subclavian catheter tip is in the superior vena cava. There has been coronary bypass grafting. Heart size is normal.  The lungs are clear. No pneumothorax following chest tube removal.     XR CHEST PORTABLE    Result Date: 9/12/2021  EXAMINATION: ONE XRAY VIEW OF THE CHEST 9/12/2021 5:55 am COMPARISON: Chest radiograph 09/11/2021. CT chest 09/08/2021. HISTORY: ORDERING SYSTEM PROVIDED HISTORY: Post op open heart surgery TECHNOLOGIST PROVIDED HISTORY: Reason for exam:->Post op open heart surgery FINDINGS: Single view provided. Mild rotation. Stable mediastinal and cardiac silhouettes. Interval Raymond-Brielle removal with stable left subclavian CVC with tip in the superior vena cava. Stable right hemidiaphragm elevation and right lung base subsegmental opacities. Bilateral chest tubes in place. No large effusion or pneumothorax. No free subdiaphragmatic air. Stable right lung base pulmonary opacities. No significant effusion or pneumothorax. XR CHEST PORTABLE    Result Date: 9/11/2021  EXAMINATION: ONE XRAY VIEW OF THE CHEST 9/11/2021 4:12 am COMPARISON: September 10, 2021 HISTORY: ORDERING SYSTEM PROVIDED HISTORY: Post op open heart surgery TECHNOLOGIST PROVIDED HISTORY: Reason for exam:->Post op open heart surgery FINDINGS: There is a left subclavian catheter with its tip at the superior cavoatrial junction. There is a left subclavian Raymond-Brielle catheter, stable. There is stable chest tubes. The cardiomediastinal silhouette is stable. There is discoid atelectasis at the bilateral lung bases. There is mild pulmonary vascular congestion. There is no pleural effusion. There is no pneumothorax. There is no acute osseous abnormality. Mild pulmonary vascular congestion. Discoid atelectasis at the bilateral lung bases. Stable chest tubes. No definite pneumothorax. XR CHEST PORTABLE    Result Date: 9/10/2021  EXAMINATION: ONE XRAY VIEW OF THE CHEST 9/10/2021 12:00 pm COMPARISON: 09/02/2021 HISTORY: ORDERING SYSTEM PROVIDED HISTORY: Post op open heart surgery TECHNOLOGIST PROVIDED HISTORY: Reason for exam:->Post op open heart surgery Reason for Exam: Post op open heart surgery Acuity: Acute Type of Exam: Initial FINDINGS: Status post median sternotomy. Endotracheal tube in satisfactory position above the kortney. NG tip near GE junction. Alsey-Brielle catheter tip in main pulmonary artery. Left chest tube in place. No pneumothorax. Bibasilar hypoaeration with subsegmental atelectatic changes. Bibasilar hypoaeration and subsegmental atelectatic changes NG tube tip near GE junction No pneumothorax     VL DUP CAROTID BILATERAL    Result Date: 9/9/2021  EXAMINATION: ULTRASOUND EVALUATION OF THE CAROTID ARTERIES 9/9/2021 TECHNIQUE: Multiple longitudinal and transverse sonograms of the carotid arteries were performed. Colored pulse wave Doppler images were also acquired. COMPARISON: None. HISTORY: ORDERING SYSTEM PROVIDED HISTORY: Pre-testing, surgery 9/10/21 TECHNOLOGIST PROVIDED HISTORY: Reason for exam:->Pre-testing, surgery 9/10/21 Reason for Exam: pre op Acuity: Unknown Type of Exam: Initial FINDINGS: RIGHT: The right common carotid artery demonstrates peak systolic velocities of 86 and 59 cm/sec in the proximal and distal segments respectively. The right internal carotid artery demonstrates the systolic velocities of 44, 69, and 64 cm/sec in the proximal, mid and distal segments respectively. The external carotid artery is patent. The vertebral artery demonstrates normal antegrade flow. No evidence of focal atherosclerotic plaque. ICA/CCA ratio of 0.8. LEFT: The left common carotid artery demonstrates peak systolic velocities of 91 and 58 cm/sec in the proximal and distal segments respectively. The left internal carotid artery demonstrates the systolic velocities of 56, 52, and 55 cm/sec in the proximal, mid and distal segments respectively. The external carotid artery is patent. The vertebral artery demonstrates normal antegrade flow. No evidence of focal atherosclerotic plaque. ICA/CCA ratio of 0.7. The right internal carotid artery demonstrates 0-50% stenosis. The left internal carotid artery demonstrates 0-50% stenosis. Bilateral vertebral arteries are patent with flow in the normal direction. US LOWER EXTREMITY UNILATERAL VEIN MAPPING    Result Date: 9/9/2021  EXAMINATION: ULTRASOUND OF THE BILATERAL LEFT LOWER EXTREMITY FOR VEIN MAPPING, 9/9/2021 9:55 am TECHNIQUE: Sonographic evaluation of the greater saphenous vein was performed. Color flow Doppler imaging was also acquired. COMPARISON: None. HISTORY: ORDERING SYSTEM PROVIDED HISTORY: Pre-testing TECHNOLOGIST PROVIDED HISTORY: Surgery 9/10/21 Reason for exam:->Pre-testing Reason for Exam: pre op Acuity: Unknown Type of Exam: Initial FINDINGS: Sapheno-femoral junction: 7.3mm. Proximal thigh:  4.7mm. Mid thigh:  4.3mm. Distal thigh:  4.4mm. Knee:  3.5mm. Proximal calf: 3.8mm. Mid calf:  6.1mm. Ankle:  3.5mm. Greater saphenous vein is patent with measurements ranging from 3.5 to 7.3mm as discussed above.        Scheduled Medicines   Medications:    glipiZIDE  10 mg Oral BID AC    metFORMIN  500 mg Oral BID WC    metFORMIN  850 mg Oral BID WC    alogliptin  25 mg Oral Daily    furosemide  40 mg IntraVENous BID    bisacodyl  10 mg Rectal Daily    insulin lispro  0-12 Units SubCUTAneous TID WC    insulin lispro  0-12 Units SubCUTAneous 2 times per day    tamsulosin  0.8 mg Oral Daily    sodium chloride flush  10 mL IntraVENous 2 times per day    aspirin  81 mg Oral Daily    amiodarone  200 mg Oral TID    mupirocin   Nasal BID    multivitamin  1 tablet Oral Daily with breakfast    sennosides-docusate sodium  1 tablet Oral Daily    carvedilol  3.125 mg Oral BID    atorvastatin  20 mg Oral Nightly    pantoprazole  40 mg Oral Daily    [START ON 9/15/2021] amiodarone  200 mg Oral Daily    hydrocortisone   Topical BID      Infusions:    dextrose      sodium chloride      norepinephrine      EPINEPHrine Stopped (09/10/21 1626)    nitroGLYCERIN           Objective:   Vitals: BP (!) 140/76   Pulse 77   Temp 99 °F (37.2 °C) (Oral)   Resp 14   Ht 5' 8\" (1.727 m)   Wt (S) 186 lb (84.4 kg)   SpO2 95%   BMI 28.28 kg/m²   General appearance: alert and cooperative with exam  Neck: no JVD or bruit  Thyroid : Normal lobes   Lungs: Has Vesicular Breath sounds   Heart:  regular rate and rhythm  Abdomen: soft, non-tender; bowel sounds normal; no masses,  no organomegaly  Musculoskeletal: Normal  Extremities: extremities normal, , no edema  Neurologic:  Awake, alert, oriented to name, place and time. Cranial nerves II-XII are grossly intact. Motor is  intact. Sensory is intact. ,  and gait is normal.    Assessment:     Patient Active Problem List:     Chest pain     Syncope     Diabetes mellitus ([facility] [address]     Hyperlipidemia     Hypertension     CAD in native artery      Plan:     1. Reviewed POC blood glucose . Labs and X ray results   2. Reviewed Current Medicines   3. On  Correction bolus Humalog/ Basal  Insulin regime / and Oral Hypoglycemic drugs   4. Monitor Blood glucose frequently   5. Modified  the dose of Insulin/ other medicines as needed   6. Will follow     .      Charline Amado MD, MD

## 2021-09-14 NOTE — PROGRESS NOTES
Physical Therapy    Physical Therapy Treatment Note  Name: Yoselin Clarke MRN: [id number] :   1945   Date:  2021   Admission Date: 9/10/2021 [facility]   Restrictions/Precautions:  Restrictions/Precautions  Restrictions/Precautions: Fall Risk, General Precautions, Cardiac     Sternal, no pushing, pulling or lifting more than 5 pounds for the first 2 weeks and then 10 pounds up to 3 months,   Arms are to support chest when changing position, coughing or sneezing. No lifting arms above 90 degrees except with the ex's  Communication with other providers:  [facility] RN states pt is ok to see for therapy  Subjective:  Patient states:  He feels like he will have a bm today  Pain:   Location, Type, Intensity (0/10 to 10/10):  0/10  Objective:    Observation:  Pt was on the toilet with nursing in the room  Treatment, including education/measures:  Vital Signs  HR: 82, B/P 175/82, O2 100% on room air  Education:  Pt was instructed in Sternal Precautions, Purpose of Exercise Program, Dmitri Scale and Signs and Symptoms of Exercise Intolerance per Cardiac Protocol  Pt was instructed in Intermediate Cardiac ex program:sitting  Overhead Side Stretch x 10  Ankle Pumps/ Heel Raises x 10  Marching Seated x 10  Forward Arm Raises x 10  Side Arm Raises x 10  Arm Crosses x 10  Arm Circles Forwards and Backwards x 10  SittingTrunkTwist x 10  Transfers with line management of   tele  Scooting : Ind with good sternal precautions  Sit to stand : Ind with good sternal precautions  Stand to sit : Ind with good sternal precautions  Gait:  Pt amb with no AD for 10 ft x 3 with min a of 1  Pt needed VC's for PLB, pt was very dizzy and needed min A off the toilet after getting good results for BM  Pt was very weak and unable to ambulate further or do steps  Safety  Patient left safely in the chair, with call light/phone in reach with alarm applied. Gait belt was used for transfers and gait.   Assessment / Impression: Patient's tolerance of treatment:  good with ex and poor with gt   Adverse Reaction: none  Significant change in status and impact:  none  Barriers to improvement:  endurance  Plan for Next Session:    Will cont to progress ex's and gt per cardiac protocol and educate pt on sternal precautions, S & S of ex intolerance, purpose of ex's, progression of ex's and gt at home. Time in:  0840  Time out:  0955  Timed treatment minutes:  75  Total treatment time:  76  Previously filed items:  Social/Functional History  Lives With: Spouse  Type of Home: House  Home Layout: One level  Home Access: Stairs to enter with rails  Entrance Stairs - Number of Steps: 3  Entrance Stairs - Rails: Both  Bathroom Shower/Tub: Tub/Shower unit  Bathroom Toilet: Standard  Bathroom Equipment: Grab bars in shower  ADL Assistance: [address] [place] [address] Avenue: [address] [place] [address] Responsibilities: Yes  Ambulation Assistance: Independent (without AD)  Transfer Assistance: Independent  Active : Yes  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

## 2021-09-14 NOTE — FLOWSHEET NOTE
Patient to X ray, Pa/Lat X ray complete. Patient still reports feeling weak and \"puney. \" Dr Ashley Wayne aware, states to continue to monitor patient, also aware of loose stools this afternoon. Patient did walk 120 ft with standard walker and tolerated well, did walk slowly, but states walker \" helped me feel more comfortable. \" Dr Ashley Wayne states to continue to monitor patient for a few more hours, and may proceed with discharge if patient is steady on his feet and diarrhea has slowed.      Moraels Persaud RN 2:50 PM

## 2021-09-14 NOTE — FLOWSHEET NOTE
Dr Milly Eaton notified patient states he would like to go home this evening. He states he will be able to rest better at home. He did ambulate another 120 feet with standard walker, tolerated well. Loose stools have decreased. Wife at bedside and in agreement with discharge. Per telephone order Dr Milly Eaton, please hold evening dose of Lasix tonight, and patient to resume Lasix in AM as ordered at home.      Giorgio Barrett RN 5:29 PM

## 2021-09-14 NOTE — FLOWSHEET NOTE
Dr Jd Lawton notified via phone of lunch pre meal . Telephone orders received to give Humalog 15 units SQ x1 now and do not give any additional sliding scale coverage at this time. Telephone orders received to increase Metformin to 1000 mg PO BID for discharge.  Judy NAJERA notified of discharge medication change as ordered per Dr Pradeep Tinajero, RN 1:49 PM

## 2021-09-14 NOTE — DISCHARGE SUMMARY
Discharge Summary     Patient ID  Siri Salas   [birth year]  [id number]      Primary Care Doctor:  Yasemin Frost MD    Admit date: 9/10/2021   Discharge date: 9/14/2021      Admitting Physician: Myron Pan MD   Discharge Physician: Akosua Patrick PA-C    Discharge Diagnoses: Active Hospital Problems    Diagnosis Date Noted    CAD in native artery [I25.10] 09/10/2021     Discharged Condition: stable. Hospital Course:. Underwent surgery of CABG x 4 after discussion and preparation. Post op ; monitored rhythm, O2 sat, I/O, Labs, CXRs serially  Neuro status , BP and vital signs monitored  Aggressively diuresed. Started on diet and activity. At discharge, pt ambulating, on RA. Special concerns: none  Further plans after discharge: f/u with Dr. Debra Burch in 2 weeks    VS at discharge   Vitals:    09/14/21 1303   BP: (!) 112/56   Pulse: 83   Resp: 15   Temp:    SpO2: 95%       Consults: cardiology and endocrinology    Disposition: home    Patient Instructions:      Medication List      START taking these medications    carvedilol 3.125 MG tablet  Commonly known as: COREG  Take 1 tablet by mouth 2 times daily     furosemide 20 MG tablet  Commonly known as: Lasix  Take 1 tablet by mouth 2 times daily     HYDROcodone-acetaminophen 5-325 MG per tablet  Commonly known as: NORCO  Take 1 tablet by mouth every 8 hours as needed for Pain for up to 3 days.      metFORMIN 1000 MG tablet  Commonly known as: GLUCOPHAGE  Take 1 tablet by mouth 2 times daily (with meals)  Replaces: metFORMIN 500 MG extended release tablet     potassium chloride 20 MEQ extended release tablet  Commonly known as: KLOR-CON M  Take 1 tablet by mouth 2 times daily        CONTINUE taking these medications    aspirin 81 MG tablet     * OCUVITE ADULT 50+ PO     * CENTRUM SILVER 50+MEN PO     CO Q 10 PO     glimepiride 4 MG tablet  Commonly known as: AMARYL     Januvia 100 MG tablet  Generic drug: SITagliptin     latanoprost 0.005 % ophthalmic solution  Commonly known as: XALATAN     pravastatin 40 MG tablet  Commonly known as: PRAVACHOL     tamsulosin 0.4 MG capsule  Commonly known as: FLOMAX         * This list has 2 medication(s) that are the same as other medications prescribed for you. Read the directions carefully, and ask your doctor or other care provider to review them with you.             STOP taking these medications    isosorbide mononitrate 30 MG extended release tablet  Commonly known as: IMDUR     lisinopril 5 MG tablet  Commonly known as: PRINIVIL;ZESTRIL     metFORMIN 500 MG extended release tablet  Commonly known as: GLUCOPHAGE-XR  Replaced by: metFORMIN 1000 MG tablet     metoprolol tartrate 25 MG tablet  Commonly known as: LOPRESSOR     minocycline 100 MG capsule  Commonly known as: MINOCIN;DYNACIN     naproxen 500 MG tablet  Commonly known as: NAPROSYN     nitroGLYCERIN 0.4 MG SL tablet  Commonly known as: NITROSTAT           Where to Get Your Medications      These medications were sent to [facility]/pharmacy #[id number]- [place] [address] - P [phone] - F [phone]  [address] [place], [place] [address] [id number]    Phone: [phone]   · carvedilol 3.125 MG tablet  · furosemide 20 MG tablet  · metFORMIN 1000 MG tablet  · potassium chloride 20 MEQ extended release tablet     You can get these medications from any pharmacy    Bring a paper prescription for each of these medications  · HYDROcodone-acetaminophen 5-325 MG per tablet       Activity: activity as tolerated and no lifting, Driving, or Strenuous exercise until seen by physician in office  Diet: cardiac diet  Wound Care: as directed    Follow-up as directed at discharge    Signed: Ty Renteria PA-C    Time spent on discharge 35 minutes

## 2021-09-15 NOTE — FLOWSHEET NOTE
Discharge instructions reviewed. Questions answered. Belongings gathered and checked with admission list. CVC line removed. Site WNL. Patient discharged to home. Taken to lobby via [address], assisted into vehicle driven by wife. RN sat at length and discussed D/C instructions with patient and wife, they both verbalize understanding of all instructions. Patient dressed in his clothing, states he has no personal belongings to take home.  No needs at this time     Oneal Apley, RN 8:32 PM

## 2021-09-16 LAB
ABO/RH: NORMAL
ANTIBODY SCREEN: NEGATIVE
COMMENT: NORMAL
COMPONENT: NORMAL
COMPONENT: NORMAL
CROSSMATCH RESULT: NORMAL
CROSSMATCH RESULT: NORMAL
STATUS: NORMAL
STATUS: NORMAL
THERMAL AMPLITUDE STUDY: NORMAL
TRANSFUSION STATUS: NORMAL
TRANSFUSION STATUS: NORMAL
UNIT DIVISION: 0
UNIT DIVISION: 0
UNIT NUMBER: NORMAL
UNIT NUMBER: NORMAL

## 2021-09-16 NOTE — PROGRESS NOTES
[facility] Liaison aware of discharge yesterday & initiated [facility].

## 2021-09-17 LAB
HEPARIN INDUCED PLATELET ANTIBODY: NEGATIVE
HEPARIN UNFRACTIONATED HD: 0 %
HEPARIN UNFRACTIONATED LD: 0 %
INTERPRETATION: NORMAL

## 2021-09-20 ENCOUNTER — HOSPITAL ENCOUNTER (OUTPATIENT)
Age: 76
Discharge: HOME | End: 2021-09-20
Payer: MEDICARE

## 2021-09-20 ENCOUNTER — HOSPITAL ENCOUNTER (OUTPATIENT)
Dept: GENERAL RADIOLOGY | Age: 76
Discharge: HOME | End: 2021-09-20
Payer: MEDICARE

## 2021-09-20 ENCOUNTER — OFFICE VISIT (OUTPATIENT)
Dept: CARDIOLOGY CLINIC | Age: 76
End: 2021-09-20
Payer: MEDICARE

## 2021-09-20 VITALS
WEIGHT: 173.8 LBS | HEART RATE: 96 BPM | HEIGHT: 68 IN | SYSTOLIC BLOOD PRESSURE: 104 MMHG | DIASTOLIC BLOOD PRESSURE: 62 MMHG | BODY MASS INDEX: 26.34 KG/M2

## 2021-09-20 DIAGNOSIS — I10 ESSENTIAL HYPERTENSION: ICD-10-CM

## 2021-09-20 DIAGNOSIS — R07.9 CHEST PAIN, UNSPECIFIED TYPE: ICD-10-CM

## 2021-09-20 DIAGNOSIS — E11.9 TYPE 2 DIABETES MELLITUS WITHOUT COMPLICATION, WITHOUT LONG-TERM CURRENT USE OF INSULIN (HCC): ICD-10-CM

## 2021-09-20 DIAGNOSIS — I25.10 CAD IN NATIVE ARTERY: Primary | ICD-10-CM

## 2021-09-20 DIAGNOSIS — E78.2 MIXED HYPERLIPIDEMIA: ICD-10-CM

## 2021-09-20 PROCEDURE — 71046 X-RAY EXAM CHEST 2 VIEWS: CPT

## 2021-09-20 PROCEDURE — G8427 DOCREV CUR MEDS BY ELIG CLIN: HCPCS | Performed by: INTERNAL MEDICINE

## 2021-09-20 PROCEDURE — 2022F DILAT RTA XM EVC RTNOPTHY: CPT | Performed by: INTERNAL MEDICINE

## 2021-09-20 PROCEDURE — 1111F DSCHRG MED/CURRENT MED MERGE: CPT | Performed by: INTERNAL MEDICINE

## 2021-09-20 PROCEDURE — 3017F COLORECTAL CA SCREEN DOC REV: CPT | Performed by: INTERNAL MEDICINE

## 2021-09-20 PROCEDURE — 1123F ACP DISCUSS/DSCN MKR DOCD: CPT | Performed by: INTERNAL MEDICINE

## 2021-09-20 PROCEDURE — 93000 ELECTROCARDIOGRAM COMPLETE: CPT | Performed by: INTERNAL MEDICINE

## 2021-09-20 PROCEDURE — 99214 OFFICE O/P EST MOD 30 MIN: CPT | Performed by: INTERNAL MEDICINE

## 2021-09-20 PROCEDURE — 4040F PNEUMOC VAC/ADMIN/RCVD: CPT | Performed by: INTERNAL MEDICINE

## 2021-09-20 PROCEDURE — 3051F HG A1C>EQUAL 7.0%<8.0%: CPT | Performed by: INTERNAL MEDICINE

## 2021-09-20 PROCEDURE — 1036F TOBACCO NON-USER: CPT | Performed by: INTERNAL MEDICINE

## 2021-09-20 PROCEDURE — G8417 CALC BMI ABV UP PARAM F/U: HCPCS | Performed by: INTERNAL MEDICINE

## 2021-09-20 RX ORDER — FUROSEMIDE 20 MG/1
20 TABLET ORAL DAILY
Qty: 90 TABLET | Refills: 1 | Status: SHIPPED | OUTPATIENT
Start: 2021-09-20 | End: 2021-11-02 | Stop reason: ALTCHOICE

## 2021-09-20 RX ORDER — MIDODRINE HYDROCHLORIDE 5 MG/1
5 TABLET ORAL 2 TIMES DAILY
Qty: 60 TABLET | Refills: 3 | Status: SHIPPED | OUTPATIENT
Start: 2021-09-20 | End: 2021-10-08 | Stop reason: SDUPTHER

## 2021-09-20 NOTE — PATIENT INSTRUCTIONS
**It is YOUR responsibilty to bring medication bottles and/or updated medication list to [address] [place] [address]. This will allow us to better serve you and all your healthcare needs**  Please be informed that if you contact our office outside of normal business hours the physician on call cannot help with any scheduling or rescheduling issues, procedure instruction questions or any type of medication issue. We advise you for any urgent/emergency that you go to the nearest emergency room! PLEASE CALL OUR OFFICE DURING NORMAL BUSINESS HOURS    Monday - Friday   8 am to 5 pm    [place]: [phone]    [place]:  [phone]  Please hold on to these instructions the  will call you within 1-9 business days when we receive authorization from your insurance. Treadmill Stress test    WHAT TO EXPECT:     The exercise stress test is a test used to provide information about how the heart responds to exertion. It involves walking on a treadmill at increasing levels of difficulty, while electrocardiogram, heart rate, and blood pressure are monitored. ? This test will take approximately 1 hours: Please arrive at the office 5-10 min before the scheduled testing time. ? Once you are taken back to the stress lab you will be asked to read and sign a consent form before proceeding with the test. At this time feel free to ask any question that you may have as the procedure is explained to you.   ? You will be attached to the EKG monitoring equipment, your blood pressure will be taken, and you will begin walking on the treadmill. The treadmill starts off slowly and every 3 minutes the treadmill speeds up and the elevation increases. The average person usually walks for a period of 6-8min. PREPARATION FOR TEST:    ? Eat a light meal such as juice and toast at least 2 hours prior to the procedure.    ? AVOID CAFFEINE 24 HOURS PRIOR TO THE TEST: Including coffee, Tea, Rogers and other soft drinks even those labeled  caffeine free or decaffeinated. ? Please wear loose comfortable clothing and comfortable walking shoes. Please wear a short sleeved shirt. ? Please shower or bathe and do not apply powder or lotion to the skin prior to testing, as the electrodes will adhere better giving us a clearer visual EKG recording.

## 2021-09-20 NOTE — PROGRESS NOTES
CARDIOLOGY   NOTE    Chief Complaint: Chest pain abnormal stress test    HPI:   Simeon Pike is a 78y.o. year old who has Past medical history as noted below. Very pleasant gentleman who comes in after Coronary artery bypass grafting x4, left internal mammary artery,sequenced to diagonal branch artery and LAD. Reverse saphenous vein,graft anastomosed to obtuse marginal 1 artery, reverse saphenous vein,graft anastomosed to distal right coronary artery in September 2021 after cardiac cath revealing multivessel disease due to abnormal stress test he was having intermittent chest pain going to his jaws mainly proceed with cardiac cath  He was getting lightheaded and dizzy therefore he was advised to hold his Lasix and carvedilol. He is not on amiodarone. EKG today shows sinus rhythm  He has history of diabetes. Due to ongoing palpitations he had Holter monitor in July 2020 which did not show any significant arrhythmias.   Blood pressures been running in the 90s to 100s at home  He is on nitro as needed but has not needed to use it so far      Current Outpatient Medications   Medication Sig Dispense Refill    metoprolol tartrate (LOPRESSOR) 25 MG tablet Take 0.5 tablets by mouth 2 times daily 60 tablet 3    midodrine (PROAMATINE) 5 MG tablet Take 1 tablet by mouth 2 times daily Take for sbp less than 90 60 tablet 3    furosemide (LASIX) 20 MG tablet Take 1 tablet by mouth daily 90 tablet 1    metFORMIN (GLUCOPHAGE) 1000 MG tablet Take 1 tablet by mouth 2 times daily (with meals) 60 tablet 3    latanoprost (XALATAN) 0.005 % ophthalmic solution Apply 1 drop to eye daily      pravastatin (PRAVACHOL) 40 MG tablet 1 tablet daily      tamsulosin (FLOMAX) 0.4 MG capsule Take 0.8 mg by mouth daily      JANUVIA 100 MG tablet 1 tablet daily      Coenzyme Q10 (CO Q 10 PO) Take by mouth daily      Multiple Vitamins-Minerals (OCUVITE ADULT 50+ PO) Take by mouth daily      glimepiride (AMARYL) 4 MG tablet Take 4 mg by mouth every morning (before breakfast)      aspirin 81 MG tablet Take 81 mg by mouth daily      Multiple Vitamins-Minerals (CENTRUM SILVER 50+MEN PO) Take by mouth      furosemide (LASIX) 20 MG tablet Take 1 tablet by mouth 2 times daily (Patient not taking: Reported on 9/20/2021) 60 tablet 1    potassium chloride (KLOR-CON M) 20 MEQ extended release tablet Take 1 tablet by mouth 2 times daily (Patient not taking: Reported on 9/20/2021) 60 tablet 0     No current facility-administered medications for this visit. Allergies:   Clarithromycin, Levaquin [levofloxacin], and Bicillin [penicillin g benzathine]    Patient History:  Past Medical History:   Diagnosis Date    Abnormal Heart Score CT 03/04/2005    Arthritis     CAD (coronary artery disease)     Cancer ([facility] [address] 2019 and 2020    Bladder    Diabetes mellitus ([facility] [address]     Elevated d-dimer 01/16/2020    Fatigue 06/18/2018    H/O cardiac catheterization 09/08/2021    STENOSIS TO : LAD, OM, CIRC, PDA & RCA, CABG evaluation.  H/O echocardiogram 01/27/2020    EF 99-39, Grade I diastolic dysfunction.     History of exercise stress test 01/27/2020    Treadmill, normal stress test, THR achieved    History of nuclear stress test 08/23/2021    EF 55%, ETT negative for ischemia/arrhythmia, Medium sized area of moderate inferior wall ischemia    Hyperlipidemia     Hypertension     Obesity 02/15/2000    Psoriasis 03/09/2004     Past Surgical History:   Procedure Laterality Date    BLADDER SURGERY      remove lesion 2019 and 2020    CHOLECYSTECTOMY  1999    COLONOSCOPY  10/22/2003    polyp x1    COLONOSCOPY  2010    Normal exam    COLONOSCOPY  12/05/2017    single 2mm polyp found in proximal ascending colon, diverticulosis found in sigmoid colon    CYST INCISION AND DRAINAGE Right 2013    FRACTURE SURGERY Right 1969    tibia    FRACTURE SURGERY Left 1994    shoulder    FRACTURE SURGERY  2011    Nose & facial bones  HERNIA REPAIR  1632    Umbilical     Family History   Problem Relation Age of Onset    Stroke Mother     Diabetes Father     Diabetes Brother     Diabetes Paternal Grandmother     Diabetes Brother     Diabetes Brother      Social History     Tobacco Use    Smoking status: Former Smoker     Types: Cigars    Smokeless tobacco: Never Used    Tobacco comment: also former cigarette smoker. 8/30/2021   Substance Use Topics    Alcohol use: Not Currently        Review of Systems:   · Constitutional: No Fever or Weight Loss   · Eyes: No Decreased Vision  · ENT: No Headaches, Hearing Loss or Vertigo  · Cardiovascular: as per note above   · Respiratory: No cough or wheezing and as per note above. · Gastrointestinal: No abdominal pain, appetite loss, blood in stools, constipation, diarrhea or heartburn  · Genitourinary: No dysuria, trouble voiding, or hematuria  · Musculoskeletal:  denies any new  joint aches , swelling  or pain   · Integumentary: No rash or pruritis  · Neurological: No TIA or stroke symptoms  · Psychiatric: No anxiety or depression  · Endocrine: No malaise, fatigue or temperature intolerance  · Hematologic/Lymphatic: No bleeding problems, blood clots or swollen lymph nodes  · Allergic/Immunologic: No nasal congestion or hives    Objective:      Physical Exam:  /62 (Site: Right Upper Arm, Position: Sitting, Cuff Size: Medium Adult)   Pulse 96   Ht 5' 8\" (1.727 m)   Wt 173 lb 12.8 oz (78.8 kg)   BMI 26.43 kg/m²   Wt Readings from Last 3 Encounters:   09/20/21 173 lb 12.8 oz (78.8 kg)   09/14/21 (S) 180 lb 6.4 oz (81.8 kg)   09/09/21 183 lb 6.4 oz (83.2 kg)     Body mass index is 26.43 kg/m². Vitals:    09/20/21 0900   BP: 104/62   Pulse: 96        General Appearance:  No distress, conversant  Constitutional:  Well developed, Well nourished, No acute distress, Non-toxic appearance.    HENT:  Normocephalic, Atraumatic, Bilateral external ears normal, Oropharynx moist, No oral exudates, Nose normal. Neck- Normal range of motion, No tenderness, Supple, No stridor,no apical-carotid delay  Eyes:  PERRL, EOMI, Conjunctiva normal, No discharge. Respiratory:  Normal breath sounds, No respiratory distress, No wheezing, No chest tenderness. ,no use of accessory muscles, NO crackles  Cardiovascular: (PMI) apex non displaced,no lifts no thrills,S1 and S2 audible, No added heart sounds, No signs of ankle edema, or volume overload, No evidence of JVD, No crackles  GI:  Bowel sounds normal, Soft, No tenderness, No masses, No gross visceromegaly   :  No costovertebral angle tenderness   Musculoskeletal:  No edema, no tenderness, no deformities. Back- no tenderness  Integument:  Well hydrated, no rash   Lymphatic:  No lymphadenopathy noted   Neurologic:  Alert & oriented x 3, CN 2-12 normal, normal motor function, normal sensory function, no focal deficits noted   Psychiatric:  Speech and behavior appropriate       Medical decision making and Data review:  DATA:  No results found for: TROPONINT  BNP:  No results found for: PROBNP  PT/INR:  No results found for: PTINR  Lab Results   Component Value Date    LABA1C 7.5 (H) 09/08/2021     Lab Results   Component Value Date    CHOL 120 08/31/2021    TRIG 105 08/31/2021    HDL 41 08/31/2021    LDLCALC 58 08/31/2021     Lab Results   Component Value Date    ALT 55 07/30/2021    AST 51 07/30/2021     No results for input(s): WBC, HGB, HCT, MCV, PLT in the last 72 hours. TSH: No results found for: TSH  Lab Results   Component Value Date    AST 51 07/30/2021    ALT 55 07/30/2021    BILITOT 0.6 07/30/2021    ALKPHOS 204 07/30/2021     No results found for: PROBNP  Lab Results   Component Value Date    LABA1C 7.5 (H) 09/08/2021     Lab Results   Component Value Date    WBC 7.3 09/13/2021    HGB 8.5 (L) 09/13/2021    HCT 26.0 (L) 09/13/2021    PLT 94 (L) 09/13/2021     Stress test  8/23/21      Summary    Abnormal Study.    Limited exercise capacity. 5 METs work load.  Physiological BP response to exercise.    ETT negative for Ischemia / Arrhythmia.    Medium sized area of moderate inferior wall Ischemia.    Normal LV function. LVEF is 55 %.    Supervising physician Dr. Kika Bonilla .         Cath      Procedure Summary   Access : radial Indication : abnormal stress test   1. LAD has proximal has 70-80 % lesion , mid LAD has 90 % lesion   , diffuse Diag disease   2. OM has 70-80 % lesion , Circ has 90 % lesion   3. RCA has proximal 80-90 % lesion , PDA has 90 % lesion    Echo 9/8/21   Conclusions      Summary   Left ventricular systolic function is normal.   Ejection fraction is visually estimated at 50-55%. Mild left ventricular hypertrophy. Indeterminate diastolic function; E/A flow reversal is noted. Sclerotic, but non-stenotic aortic valve. No evidence of any pericardial effusion. All labs, medications and tests reviewed by myself including data and history from outside source , patient and available family . Assessment & Plan:      1. CAD in native artery    2. Essential hypertension    3. Mixed hyperlipidemia    4. Type 2 diabetes mellitus without complication, without long-term current use of insulin ([facility])    5. Chest pain, unspecified type         ASCVD  S/p CABG with LIMA to LAD and Diag, SVG to OM and SVG to PDA in sept 2021. He is winded and short of breath but getting dizzy and lightheaded hence Lasix was held. Get chest x-ray today and restart Lasix if it shows effusion. Stop Coreg start metoprolol 12.5 twice daily due to tachycardia. Start midodrine 5 mg twice a day if blood pressure is less than 90  Get treadmill and refer to cardiac rehab    Hypertension   Advised to check blood pressure at home start metoprolol 12.5 twice daily for cardiac protection and to help with angina    Diabetes mellitus ([facility] [address]   Followed closely by Dr. Barrett Quispe     Dyslipidemia :  All available lab work was reviewed.   Patient was advised to repeat lab work before next visit. Necessary orders were placed , instructions given by myself       Counseled extensively and medication compliance urged. We discussed that for the  prevention of ASCVD our  goal is aggressive risk modification. Patient is encouraged to exercise if they can , educated about  brisk walk for 30 minutes  at least 3 to 4 times a week if there are no physical limitations  Various goals were discussed and questions answered. Continue current medications. Appropriate prescriptions are addressed and refills ordered. Questions answered and patient verbalizes understanding. Call for any problems, questions, or concerns. Greater than 60 % of time spent counseling besides reviewing data and images     Continue all other medications of all above medical condition listed as is. Return in about 6 months (around 3/20/2022). Please note this report has been partially produced using speech recognition software and may contain errors related to that system including errors in grammar, punctuation, and spelling, as well as words and phrases that may be inappropriate. If there are any questions or concerns please feel free to contact the dictating provider for clarification.

## 2021-10-08 ENCOUNTER — TELEPHONE (OUTPATIENT)
Dept: CARDIOLOGY CLINIC | Age: 76
End: 2021-10-08

## 2021-10-08 ENCOUNTER — PROCEDURE VISIT (OUTPATIENT)
Dept: CARDIOLOGY CLINIC | Age: 76
End: 2021-10-08
Payer: MEDICARE

## 2021-10-08 DIAGNOSIS — E11.9 TYPE 2 DIABETES MELLITUS WITHOUT COMPLICATION, WITHOUT LONG-TERM CURRENT USE OF INSULIN (HCC): ICD-10-CM

## 2021-10-08 DIAGNOSIS — I10 ESSENTIAL HYPERTENSION: ICD-10-CM

## 2021-10-08 DIAGNOSIS — R07.9 CHEST PAIN, UNSPECIFIED TYPE: ICD-10-CM

## 2021-10-08 DIAGNOSIS — E78.2 MIXED HYPERLIPIDEMIA: ICD-10-CM

## 2021-10-08 DIAGNOSIS — I25.10 CAD IN NATIVE ARTERY: ICD-10-CM

## 2021-10-08 PROCEDURE — 93015 CV STRESS TEST SUPVJ I&R: CPT | Performed by: INTERNAL MEDICINE

## 2021-10-08 RX ORDER — MIDODRINE HYDROCHLORIDE 10 MG/1
10 TABLET ORAL 2 TIMES DAILY
Qty: 60 TABLET | Refills: 5 | Status: SHIPPED | OUTPATIENT
Start: 2021-10-08 | End: 2021-11-16 | Stop reason: DRUGHIGH

## 2021-10-08 NOTE — TELEPHONE ENCOUNTER
Patient stopped at desk after stress test. Complaint of dizziness. Patient taken to room, HR 68, B/P 98/64. Wife states B/P has been low, midodrine does not seem to help. Discussed with Reynaldo Sigala NP, increased Midodrine to 10 mg BID, advised patient to stay well hydrated. Advised to call office is no improvement. Patient and wife voiced understanding.  Patient felt better and left office with wife

## 2021-11-03 ENCOUNTER — HOSPITAL ENCOUNTER (OUTPATIENT)
Dept: CARDIAC REHAB | Age: 76
Setting detail: THERAPIES SERIES
Discharge: HOME | End: 2021-11-03
Payer: MEDICARE

## 2021-11-03 PROCEDURE — G0423 INTENS CARDIAC REHAB NO EXER: HCPCS

## 2021-11-03 PROCEDURE — G0422 INTENS CARDIAC REHAB W/EXERC: HCPCS

## 2021-11-08 ENCOUNTER — HOSPITAL ENCOUNTER (OUTPATIENT)
Dept: CARDIAC REHAB | Age: 76
Setting detail: THERAPIES SERIES
Discharge: HOME | End: 2021-11-08
Payer: MEDICARE

## 2021-11-08 LAB — GLUCOSE BLD-MCNC: 141 MG/DL (ref 70–99)

## 2021-11-08 PROCEDURE — 82962 GLUCOSE BLOOD TEST: CPT

## 2021-11-08 PROCEDURE — G0422 INTENS CARDIAC REHAB W/EXERC: HCPCS

## 2021-11-08 PROCEDURE — G0423 INTENS CARDIAC REHAB NO EXER: HCPCS

## 2021-11-09 ENCOUNTER — HOSPITAL ENCOUNTER (OUTPATIENT)
Dept: CARDIAC REHAB | Age: 76
Setting detail: THERAPIES SERIES
Discharge: HOME | End: 2021-11-09
Payer: MEDICARE

## 2021-11-09 LAB — GLUCOSE BLD-MCNC: 114 MG/DL (ref 70–99)

## 2021-11-09 PROCEDURE — G0422 INTENS CARDIAC REHAB W/EXERC: HCPCS

## 2021-11-09 PROCEDURE — 82962 GLUCOSE BLOOD TEST: CPT

## 2021-11-09 PROCEDURE — G0423 INTENS CARDIAC REHAB NO EXER: HCPCS

## 2021-11-11 ENCOUNTER — HOSPITAL ENCOUNTER (OUTPATIENT)
Dept: CARDIAC REHAB | Age: 76
Setting detail: THERAPIES SERIES
Discharge: HOME | End: 2021-11-11
Payer: MEDICARE

## 2021-11-11 PROCEDURE — G0422 INTENS CARDIAC REHAB W/EXERC: HCPCS

## 2021-11-11 PROCEDURE — G0423 INTENS CARDIAC REHAB NO EXER: HCPCS

## 2021-11-15 ENCOUNTER — HOSPITAL ENCOUNTER (OUTPATIENT)
Dept: CARDIAC REHAB | Age: 76
Setting detail: THERAPIES SERIES
Discharge: HOME | End: 2021-11-15
Payer: MEDICARE

## 2021-11-15 PROCEDURE — G0423 INTENS CARDIAC REHAB NO EXER: HCPCS

## 2021-11-15 PROCEDURE — G0422 INTENS CARDIAC REHAB W/EXERC: HCPCS

## 2021-11-16 ENCOUNTER — HOSPITAL ENCOUNTER (OUTPATIENT)
Dept: CARDIAC REHAB | Age: 76
Setting detail: THERAPIES SERIES
Discharge: HOME | End: 2021-11-16
Payer: MEDICARE

## 2021-11-16 PROCEDURE — G0423 INTENS CARDIAC REHAB NO EXER: HCPCS

## 2021-11-16 PROCEDURE — G0422 INTENS CARDIAC REHAB W/EXERC: HCPCS

## 2021-11-18 ENCOUNTER — HOSPITAL ENCOUNTER (OUTPATIENT)
Dept: CARDIAC REHAB | Age: 76
Setting detail: THERAPIES SERIES
Discharge: HOME | End: 2021-11-18
Payer: MEDICARE

## 2021-11-18 PROCEDURE — G0422 INTENS CARDIAC REHAB W/EXERC: HCPCS

## 2021-11-18 PROCEDURE — G0423 INTENS CARDIAC REHAB NO EXER: HCPCS

## 2021-11-22 ENCOUNTER — HOSPITAL ENCOUNTER (OUTPATIENT)
Dept: CARDIAC REHAB | Age: 76
Setting detail: THERAPIES SERIES
Discharge: HOME | End: 2021-11-22
Payer: MEDICARE

## 2021-11-22 PROBLEM — R91.1 PULMONARY NODULE: Status: ACTIVE | Noted: 2021-11-22

## 2021-11-22 PROCEDURE — G0422 INTENS CARDIAC REHAB W/EXERC: HCPCS

## 2021-11-22 PROCEDURE — G0423 INTENS CARDIAC REHAB NO EXER: HCPCS

## 2021-11-23 ENCOUNTER — HOSPITAL ENCOUNTER (OUTPATIENT)
Dept: CARDIAC REHAB | Age: 76
Setting detail: THERAPIES SERIES
Discharge: HOME | End: 2021-11-23
Payer: MEDICARE

## 2021-11-23 PROCEDURE — G0423 INTENS CARDIAC REHAB NO EXER: HCPCS

## 2021-11-23 PROCEDURE — G0422 INTENS CARDIAC REHAB W/EXERC: HCPCS

## 2021-12-02 ENCOUNTER — HOSPITAL ENCOUNTER (OUTPATIENT)
Dept: CARDIAC REHAB | Age: 76
Setting detail: THERAPIES SERIES
Discharge: HOME | End: 2021-12-02
Payer: MEDICARE

## 2021-12-02 PROCEDURE — G0423 INTENS CARDIAC REHAB NO EXER: HCPCS

## 2021-12-02 PROCEDURE — G0422 INTENS CARDIAC REHAB W/EXERC: HCPCS

## 2021-12-06 ENCOUNTER — HOSPITAL ENCOUNTER (OUTPATIENT)
Dept: CARDIAC REHAB | Age: 76
Setting detail: THERAPIES SERIES
Discharge: HOME | End: 2021-12-06
Payer: MEDICARE

## 2021-12-06 PROCEDURE — G0422 INTENS CARDIAC REHAB W/EXERC: HCPCS

## 2021-12-06 PROCEDURE — G0423 INTENS CARDIAC REHAB NO EXER: HCPCS

## 2021-12-07 ENCOUNTER — HOSPITAL ENCOUNTER (OUTPATIENT)
Dept: CARDIAC REHAB | Age: 76
Setting detail: THERAPIES SERIES
Discharge: HOME | End: 2021-12-07
Payer: MEDICARE

## 2021-12-07 PROCEDURE — G0423 INTENS CARDIAC REHAB NO EXER: HCPCS

## 2021-12-07 PROCEDURE — G0422 INTENS CARDIAC REHAB W/EXERC: HCPCS

## 2021-12-09 ENCOUNTER — HOSPITAL ENCOUNTER (OUTPATIENT)
Dept: CARDIAC REHAB | Age: 76
Setting detail: THERAPIES SERIES
Discharge: HOME | End: 2021-12-09
Payer: MEDICARE

## 2021-12-09 PROCEDURE — G0423 INTENS CARDIAC REHAB NO EXER: HCPCS

## 2021-12-09 PROCEDURE — G0422 INTENS CARDIAC REHAB W/EXERC: HCPCS

## 2021-12-13 ENCOUNTER — HOSPITAL ENCOUNTER (OUTPATIENT)
Dept: CARDIAC REHAB | Age: 76
Setting detail: THERAPIES SERIES
Discharge: HOME | End: 2021-12-13
Payer: MEDICARE

## 2021-12-13 PROCEDURE — G0422 INTENS CARDIAC REHAB W/EXERC: HCPCS

## 2021-12-13 PROCEDURE — G0423 INTENS CARDIAC REHAB NO EXER: HCPCS

## 2021-12-14 ENCOUNTER — HOSPITAL ENCOUNTER (OUTPATIENT)
Dept: CARDIAC REHAB | Age: 76
Setting detail: THERAPIES SERIES
Discharge: HOME | End: 2021-12-14
Payer: MEDICARE

## 2021-12-14 PROCEDURE — G0423 INTENS CARDIAC REHAB NO EXER: HCPCS

## 2021-12-14 PROCEDURE — G0422 INTENS CARDIAC REHAB W/EXERC: HCPCS

## 2021-12-16 ENCOUNTER — HOSPITAL ENCOUNTER (OUTPATIENT)
Dept: CARDIAC REHAB | Age: 76
Setting detail: THERAPIES SERIES
Discharge: HOME | End: 2021-12-16
Payer: MEDICARE

## 2021-12-16 PROCEDURE — G0422 INTENS CARDIAC REHAB W/EXERC: HCPCS

## 2021-12-16 PROCEDURE — G0423 INTENS CARDIAC REHAB NO EXER: HCPCS

## 2021-12-20 ENCOUNTER — HOSPITAL ENCOUNTER (OUTPATIENT)
Dept: CARDIAC REHAB | Age: 76
Setting detail: THERAPIES SERIES
Discharge: HOME | End: 2021-12-20
Payer: MEDICARE

## 2021-12-20 PROCEDURE — G0423 INTENS CARDIAC REHAB NO EXER: HCPCS

## 2021-12-20 PROCEDURE — G0422 INTENS CARDIAC REHAB W/EXERC: HCPCS

## 2021-12-23 ENCOUNTER — HOSPITAL ENCOUNTER (OUTPATIENT)
Dept: CARDIAC REHAB | Age: 76
Setting detail: THERAPIES SERIES
Discharge: HOME | End: 2021-12-23
Payer: MEDICARE

## 2021-12-23 PROCEDURE — G0423 INTENS CARDIAC REHAB NO EXER: HCPCS

## 2021-12-23 PROCEDURE — G0422 INTENS CARDIAC REHAB W/EXERC: HCPCS

## 2021-12-27 ENCOUNTER — HOSPITAL ENCOUNTER (OUTPATIENT)
Dept: CARDIAC REHAB | Age: 76
Setting detail: THERAPIES SERIES
Discharge: HOME | End: 2021-12-27
Payer: MEDICARE

## 2021-12-27 PROCEDURE — G0422 INTENS CARDIAC REHAB W/EXERC: HCPCS

## 2021-12-27 PROCEDURE — G0423 INTENS CARDIAC REHAB NO EXER: HCPCS

## 2021-12-28 ENCOUNTER — HOSPITAL ENCOUNTER (OUTPATIENT)
Dept: CARDIAC REHAB | Age: 76
Setting detail: THERAPIES SERIES
Discharge: HOME | End: 2021-12-28
Payer: MEDICARE

## 2021-12-28 PROCEDURE — G0423 INTENS CARDIAC REHAB NO EXER: HCPCS

## 2021-12-28 PROCEDURE — G0422 INTENS CARDIAC REHAB W/EXERC: HCPCS

## 2021-12-30 ENCOUNTER — HOSPITAL ENCOUNTER (OUTPATIENT)
Dept: CARDIAC REHAB | Age: 76
Setting detail: THERAPIES SERIES
Discharge: HOME | End: 2021-12-30
Payer: MEDICARE

## 2021-12-30 PROCEDURE — G0423 INTENS CARDIAC REHAB NO EXER: HCPCS

## 2021-12-30 PROCEDURE — G0422 INTENS CARDIAC REHAB W/EXERC: HCPCS

## 2022-01-03 ENCOUNTER — HOSPITAL ENCOUNTER (OUTPATIENT)
Dept: CARDIAC REHAB | Age: 77
Setting detail: THERAPIES SERIES
Discharge: HOME | End: 2022-01-03
Payer: MEDICARE

## 2022-01-03 PROCEDURE — G0422 INTENS CARDIAC REHAB W/EXERC: HCPCS

## 2022-01-03 PROCEDURE — G0423 INTENS CARDIAC REHAB NO EXER: HCPCS

## 2022-01-04 ENCOUNTER — HOSPITAL ENCOUNTER (OUTPATIENT)
Dept: CARDIAC REHAB | Age: 77
Setting detail: THERAPIES SERIES
Discharge: HOME | End: 2022-01-04
Payer: MEDICARE

## 2022-01-04 PROCEDURE — G0423 INTENS CARDIAC REHAB NO EXER: HCPCS

## 2022-01-04 PROCEDURE — G0422 INTENS CARDIAC REHAB W/EXERC: HCPCS

## 2022-01-06 ENCOUNTER — HOSPITAL ENCOUNTER (OUTPATIENT)
Dept: CARDIAC REHAB | Age: 77
Setting detail: THERAPIES SERIES
Discharge: HOME | End: 2022-01-06
Payer: MEDICARE

## 2022-01-06 PROCEDURE — G0422 INTENS CARDIAC REHAB W/EXERC: HCPCS

## 2022-01-06 PROCEDURE — G0423 INTENS CARDIAC REHAB NO EXER: HCPCS

## 2022-01-10 ENCOUNTER — HOSPITAL ENCOUNTER (OUTPATIENT)
Dept: CARDIAC REHAB | Age: 77
Setting detail: THERAPIES SERIES
Discharge: HOME | End: 2022-01-10
Payer: MEDICARE

## 2022-01-10 PROCEDURE — G0423 INTENS CARDIAC REHAB NO EXER: HCPCS

## 2022-01-10 PROCEDURE — G0422 INTENS CARDIAC REHAB W/EXERC: HCPCS

## 2022-01-11 ENCOUNTER — HOSPITAL ENCOUNTER (OUTPATIENT)
Dept: CARDIAC REHAB | Age: 77
Setting detail: THERAPIES SERIES
Discharge: HOME | End: 2022-01-11
Payer: MEDICARE

## 2022-01-11 PROCEDURE — G0423 INTENS CARDIAC REHAB NO EXER: HCPCS

## 2022-01-11 PROCEDURE — G0422 INTENS CARDIAC REHAB W/EXERC: HCPCS

## 2022-01-13 ENCOUNTER — HOSPITAL ENCOUNTER (OUTPATIENT)
Dept: CARDIAC REHAB | Age: 77
Setting detail: THERAPIES SERIES
Discharge: HOME | End: 2022-01-13
Payer: MEDICARE

## 2022-01-13 PROCEDURE — G0422 INTENS CARDIAC REHAB W/EXERC: HCPCS

## 2022-01-13 PROCEDURE — G0423 INTENS CARDIAC REHAB NO EXER: HCPCS

## 2022-01-18 ENCOUNTER — HOSPITAL ENCOUNTER (OUTPATIENT)
Dept: CARDIAC REHAB | Age: 77
Setting detail: THERAPIES SERIES
Discharge: HOME | End: 2022-01-18
Payer: MEDICARE

## 2022-01-18 PROCEDURE — G0423 INTENS CARDIAC REHAB NO EXER: HCPCS

## 2022-01-18 PROCEDURE — G0422 INTENS CARDIAC REHAB W/EXERC: HCPCS

## 2022-01-20 ENCOUNTER — HOSPITAL ENCOUNTER (OUTPATIENT)
Dept: CARDIAC REHAB | Age: 77
Setting detail: THERAPIES SERIES
Discharge: HOME | End: 2022-01-20
Payer: MEDICARE

## 2022-01-20 PROCEDURE — G0423 INTENS CARDIAC REHAB NO EXER: HCPCS

## 2022-01-20 PROCEDURE — G0422 INTENS CARDIAC REHAB W/EXERC: HCPCS

## 2022-01-24 ENCOUNTER — HOSPITAL ENCOUNTER (OUTPATIENT)
Dept: CARDIAC REHAB | Age: 77
Setting detail: THERAPIES SERIES
Discharge: HOME | End: 2022-01-24
Payer: MEDICARE

## 2022-01-24 PROCEDURE — G0422 INTENS CARDIAC REHAB W/EXERC: HCPCS

## 2022-01-24 PROCEDURE — G0423 INTENS CARDIAC REHAB NO EXER: HCPCS

## 2022-01-25 ENCOUNTER — HOSPITAL ENCOUNTER (OUTPATIENT)
Dept: CARDIAC REHAB | Age: 77
Setting detail: THERAPIES SERIES
Discharge: HOME | End: 2022-01-25
Payer: MEDICARE

## 2022-01-25 PROCEDURE — G0423 INTENS CARDIAC REHAB NO EXER: HCPCS

## 2022-01-25 PROCEDURE — G0422 INTENS CARDIAC REHAB W/EXERC: HCPCS

## 2022-01-27 ENCOUNTER — HOSPITAL ENCOUNTER (OUTPATIENT)
Dept: CARDIAC REHAB | Age: 77
Setting detail: THERAPIES SERIES
Discharge: HOME | End: 2022-01-27
Payer: MEDICARE

## 2022-01-27 PROCEDURE — G0423 INTENS CARDIAC REHAB NO EXER: HCPCS

## 2022-01-27 PROCEDURE — G0422 INTENS CARDIAC REHAB W/EXERC: HCPCS

## 2022-01-31 ENCOUNTER — HOSPITAL ENCOUNTER (OUTPATIENT)
Dept: CARDIAC REHAB | Age: 77
Setting detail: THERAPIES SERIES
Discharge: HOME | End: 2022-01-31
Payer: MEDICARE

## 2022-01-31 PROCEDURE — G0423 INTENS CARDIAC REHAB NO EXER: HCPCS

## 2022-01-31 PROCEDURE — G0422 INTENS CARDIAC REHAB W/EXERC: HCPCS

## 2022-02-01 ENCOUNTER — HOSPITAL ENCOUNTER (OUTPATIENT)
Dept: CARDIAC REHAB | Age: 77
Setting detail: THERAPIES SERIES
Discharge: HOME | End: 2022-02-01
Payer: MEDICARE

## 2022-02-01 PROCEDURE — G0422 INTENS CARDIAC REHAB W/EXERC: HCPCS

## 2022-02-01 PROCEDURE — G0423 INTENS CARDIAC REHAB NO EXER: HCPCS

## 2022-02-07 ENCOUNTER — HOSPITAL ENCOUNTER (OUTPATIENT)
Dept: CARDIAC REHAB | Age: 77
Setting detail: THERAPIES SERIES
Discharge: HOME | End: 2022-02-07
Payer: MEDICARE

## 2022-02-07 PROCEDURE — G0423 INTENS CARDIAC REHAB NO EXER: HCPCS

## 2022-02-07 PROCEDURE — G0422 INTENS CARDIAC REHAB W/EXERC: HCPCS

## 2022-02-08 ENCOUNTER — HOSPITAL ENCOUNTER (OUTPATIENT)
Dept: CARDIAC REHAB | Age: 77
Setting detail: THERAPIES SERIES
Discharge: HOME | End: 2022-02-08
Payer: MEDICARE

## 2022-02-08 PROCEDURE — G0423 INTENS CARDIAC REHAB NO EXER: HCPCS

## 2022-02-08 PROCEDURE — G0422 INTENS CARDIAC REHAB W/EXERC: HCPCS

## 2022-02-10 ENCOUNTER — APPOINTMENT (OUTPATIENT)
Dept: CARDIAC REHAB | Age: 77
End: 2022-02-10
Payer: MEDICARE

## 2022-02-14 ENCOUNTER — OFFICE VISIT (OUTPATIENT)
Dept: CARDIOLOGY CLINIC | Age: 77
End: 2022-02-14
Payer: MEDICARE

## 2022-02-14 VITALS
BODY MASS INDEX: 28.04 KG/M2 | SYSTOLIC BLOOD PRESSURE: 106 MMHG | HEART RATE: 60 BPM | OXYGEN SATURATION: 98 % | DIASTOLIC BLOOD PRESSURE: 58 MMHG | WEIGHT: 185 LBS | HEIGHT: 68 IN

## 2022-02-14 DIAGNOSIS — I10 PRIMARY HYPERTENSION: ICD-10-CM

## 2022-02-14 DIAGNOSIS — I95.1 ORTHOSTATIC HYPOTENSION: ICD-10-CM

## 2022-02-14 DIAGNOSIS — E11.9 TYPE 2 DIABETES MELLITUS WITHOUT COMPLICATION, WITHOUT LONG-TERM CURRENT USE OF INSULIN (HCC): ICD-10-CM

## 2022-02-14 DIAGNOSIS — E78.2 MIXED HYPERLIPIDEMIA: Primary | ICD-10-CM

## 2022-02-14 DIAGNOSIS — I25.10 CAD IN NATIVE ARTERY: ICD-10-CM

## 2022-02-14 DIAGNOSIS — R07.9 CHEST PAIN, UNSPECIFIED TYPE: ICD-10-CM

## 2022-02-14 PROCEDURE — 99214 OFFICE O/P EST MOD 30 MIN: CPT | Performed by: INTERNAL MEDICINE

## 2022-02-14 PROCEDURE — G8417 CALC BMI ABV UP PARAM F/U: HCPCS | Performed by: INTERNAL MEDICINE

## 2022-02-14 PROCEDURE — 1036F TOBACCO NON-USER: CPT | Performed by: INTERNAL MEDICINE

## 2022-02-14 PROCEDURE — G8427 DOCREV CUR MEDS BY ELIG CLIN: HCPCS | Performed by: INTERNAL MEDICINE

## 2022-02-14 PROCEDURE — 2022F DILAT RTA XM EVC RTNOPTHY: CPT | Performed by: INTERNAL MEDICINE

## 2022-02-14 PROCEDURE — G8484 FLU IMMUNIZE NO ADMIN: HCPCS | Performed by: INTERNAL MEDICINE

## 2022-02-14 PROCEDURE — 3017F COLORECTAL CA SCREEN DOC REV: CPT | Performed by: INTERNAL MEDICINE

## 2022-02-14 PROCEDURE — 1123F ACP DISCUSS/DSCN MKR DOCD: CPT | Performed by: INTERNAL MEDICINE

## 2022-02-14 PROCEDURE — 3046F HEMOGLOBIN A1C LEVEL >9.0%: CPT | Performed by: INTERNAL MEDICINE

## 2022-02-14 PROCEDURE — 4040F PNEUMOC VAC/ADMIN/RCVD: CPT | Performed by: INTERNAL MEDICINE

## 2022-02-14 NOTE — PROGRESS NOTES
CARDIOLOGY   NOTE    Chief Complaint: Chest pain abnormal stress test    HPI:   Taj Daly is a 78y.o. year old who has Past medical history as noted below. He says he is not wearing compression stockings, bp at home is in 100's and occasionally going up to 160's HE is on florinef and Kdur , not taking lasix  HE completed cardiac rehab    Very pleasant gentleman who comes in after Coronary artery bypass grafting x4, left internal mammary artery,sequenced to diagonal branch artery and LAD. Reverse saphenous vein,graft anastomosed to obtuse marginal 1 artery, reverse saphenous vein,graft anastomosed to distal right coronary artery in September 2021 . Diabetes is betetr after staring [facility] and [place]   He says dizziness is  Better . EKG today shows sinus rhythm  He has history of diabetes. Due to ongoing palpitations he had Holter monitor in July 2020 which did not show any significant arrhythmias.   Blood pressures been running in the 90s to 100s at home  He is on nitro as needed but has not needed to use it so far      Current Outpatient Medications   Medication Sig Dispense Refill    metoprolol tartrate (LOPRESSOR) 25 MG tablet Take 0.5 tablets by mouth 2 times daily 180 tablet 3    metFORMIN (GLUCOPHAGE) 1000 MG tablet Take 1 tablet by mouth 2 times daily (with meals) 60 tablet 3    latanoprost (XALATAN) 0.005 % ophthalmic solution Apply 1 drop to eye daily      pravastatin (PRAVACHOL) 40 MG tablet 1 tablet daily      tamsulosin (FLOMAX) 0.4 MG capsule Take 0.8 mg by mouth daily      JANUVIA 100 MG tablet 1 tablet daily      Coenzyme Q10 (CO Q 10 PO) Take by mouth daily      Multiple Vitamins-Minerals (OCUVITE ADULT 50+ PO) Take by mouth daily      glimepiride (AMARYL) 4 MG tablet Take 4 mg by mouth every morning (before breakfast)      aspirin 81 MG tablet Take 81 mg by mouth daily      Multiple Vitamins-Minerals (CENTRUM SILVER 50+MEN PO) Take by mouth       No current facility-administered medications for this visit. Allergies:   Clarithromycin, Levaquin [levofloxacin], and Bicillin [penicillin g benzathine]    Patient History:  Past Medical History:   Diagnosis Date    Abnormal Heart Score CT 03/04/2005    Arthritis     CAD (coronary artery disease)     Cancer ([facility] [address] 2019 and 2020    Bladder    Diabetes mellitus ([facility] [address] [facility]     Elevated d-dimer 01/16/2020    Fatigue 06/18/2018    H/O cardiac catheterization 09/08/2021    STENOSIS TO : LAD, OM, CIRC, PDA & RCA, CABG evaluation.  H/O echocardiogram 01/27/2020    EF 31-54, Grade I diastolic dysfunction.     History of exercise stress test 01/27/2020    Treadmill, normal stress test, THR achieved    History of nuclear stress test 08/23/2021    EF 55%, ETT negative for ischemia/arrhythmia, Medium sized area of moderate inferior wall ischemia    Hyperlipidemia     Hypertension     Obesity 02/15/2000    Psoriasis 03/09/2004     Past Surgical History:   Procedure Laterality Date    BLADDER SURGERY      remove lesion 2019 and 2020    CHOLECYSTECTOMY  1999    COLONOSCOPY  10/22/2003    polyp x1    COLONOSCOPY  2010    Normal exam    COLONOSCOPY  12/05/2017    single 2mm polyp found in proximal ascending colon, diverticulosis found in sigmoid colon    CORONARY ARTERY BYPASS GRAFT N/A 9/10/2021    CABG CORONARY ARTERY BYPASS performed by Farida Webb MD at [address] Right 2013    FRACTURE SURGERY Right 1969    tibia    FRACTURE SURGERY Left 1994    shoulder    FRACTURE SURGERY  2011    Nose & facial bones    HERNIA REPAIR  [id number]    Umbilical     Family History   Problem Relation Age of Onset    Stroke Mother     Diabetes Father     Diabetes Brother     Diabetes Paternal Grandmother     Diabetes Brother     Diabetes Brother      Social History     Tobacco Use    Smoking status: Former Smoker     Types: Cigars    Smokeless tobacco: Never Used    Tobacco comment: also former cigarette smoker. 8/30/2021   Substance Use Topics    Alcohol use: Not Currently        Review of Systems:   · Constitutional: No Fever or Weight Loss   · Eyes: No Decreased Vision  · ENT: No Headaches, Hearing Loss or Vertigo  · Cardiovascular: as per note above   · Respiratory: No cough or wheezing and as per note above. · Gastrointestinal: No abdominal pain, appetite loss, blood in stools, constipation, diarrhea or heartburn  · Genitourinary: No dysuria, trouble voiding, or hematuria  · Musculoskeletal:  denies any new  joint aches , swelling  or pain   · Integumentary: No rash or pruritis  · Neurological: No TIA or stroke symptoms  · Psychiatric: No anxiety or depression  · Endocrine: No malaise, fatigue or temperature intolerance  · Hematologic/Lymphatic: No bleeding problems, blood clots or swollen lymph nodes  · Allergic/Immunologic: No nasal congestion or hives    Objective:      Physical Exam:  BP (!) 106/58 (Site: Left Upper Arm, Position: Sitting, Cuff Size: Medium Adult)   Pulse 60   Ht 5' 8\" (1.727 m)   Wt 185 lb (83.9 kg)   SpO2 98%   BMI 28.13 kg/m²   Wt Readings from Last 3 Encounters:   02/14/22 185 lb (83.9 kg)   11/16/21 184 lb (83.5 kg)   11/02/21 180 lb (81.6 kg)     Body mass index is 28.13 kg/m². Vitals:    02/14/22 1121   BP: (!) 106/58   Pulse: 60   SpO2: 98%        General Appearance:  No distress, conversant  Constitutional:  Well developed, Well nourished, No acute distress, Non-toxic appearance. HENT:  Normocephalic, Atraumatic, Bilateral external ears normal, Oropharynx moist, No oral exudates, Nose normal. Neck- Normal range of motion, No tenderness, Supple, No stridor,no apical-carotid delay  Eyes:  PERRL, EOMI, Conjunctiva normal, No discharge. Respiratory:  Normal breath sounds, No respiratory distress, No wheezing, No chest tenderness. ,no use of accessory muscles, NO crackles  Cardiovascular: (PMI) apex non displaced,no lifts no thrills,S1 and S2 audible, No added heart sounds, No signs of ankle edema, or volume overload, No evidence of JVD, No crackles  GI:  Bowel sounds normal, Soft, No tenderness, No masses, No gross visceromegaly   :  No costovertebral angle tenderness   Musculoskeletal:  No edema, no tenderness, no deformities. Back- no tenderness  Integument:  Well hydrated, no rash   Lymphatic:  No lymphadenopathy noted   Neurologic:  Alert & oriented x 3, CN 2-12 normal, normal motor function, normal sensory function, no focal deficits noted   Psychiatric:  Speech and behavior appropriate       Medical decision making and Data review:  DATA:  No results found for: TROPONINT  BNP:  No results found for: PROBNP  PT/INR:  No results found for: PTINR  Lab Results   Component Value Date    LABA1C 7.5 (H) 09/08/2021     Lab Results   Component Value Date    CHOL 120 08/31/2021    TRIG 105 08/31/2021    HDL 41 08/31/2021    LDLCALC 58 08/31/2021     Lab Results   Component Value Date    ALT 55 07/30/2021    AST 51 07/30/2021     No results for input(s): WBC, HGB, HCT, MCV, PLT in the last 72 hours. TSH: No results found for: TSH  Lab Results   Component Value Date    AST 51 07/30/2021    ALT 55 07/30/2021    BILITOT 0.6 07/30/2021    ALKPHOS 204 07/30/2021     No results found for: PROBNP  Lab Results   Component Value Date    LABA1C 7.5 (H) 09/08/2021     Lab Results   Component Value Date    WBC 7.3 09/13/2021    HGB 8.5 (L) 09/13/2021    HCT 26.0 (L) 09/13/2021    PLT 94 (L) 09/13/2021     Stress test  8/23/21      Summary    Abnormal Study.    Limited exercise capacity. 5 METs work load.    Physiological BP response to exercise.    ETT negative for Ischemia / Arrhythmia.    Medium sized area of moderate inferior wall Ischemia.    Normal LV function. LVEF is 55 %.    Supervising physician Dr. Annika Vidal .         Cath      Procedure Summary   Access : radial Indication : abnormal stress test   1.  LAD has proximal has 70-80 % lesion , mid LAD has 90 % lesion   , diffuse Diag disease   2. OM has 70-80 % lesion , Circ has 90 % lesion   3. RCA has proximal 80-90 % lesion , PDA has 90 % lesion    Echo 9/8/21   Conclusions      Summary   Left ventricular systolic function is normal.   Ejection fraction is visually estimated at 50-55%. Mild left ventricular hypertrophy. Indeterminate diastolic function; E/A flow reversal is noted. Sclerotic, but non-stenotic aortic valve. No evidence of any pericardial effusion. All labs, medications and tests reviewed by myself including data and history from outside source , patient and available family . Assessment & Plan:      1. Mixed hyperlipidemia    2. Type 2 diabetes mellitus without complication, without long-term current use of insulin ([facility] [address]    3. Primary hypertension    4. CAD in native artery    5. Chest pain, unspecified type    6. Orthostatic hypotension         ASCVD  S/p CABG with LIMA to LAD and Diag, SVG to OM and SVG to PDA in sept 2021. . On  metoprolol 12.5 twice daily , cannot tolerate higher dose . He was on florinef but I asked him to stop it since his bp at home is hitting 160's  Stopped  midodrine , as bp is better     Hypertension   Advised to check blood pressure at home . metoprolol 12.5 twice daily for cardiac protection and to help with angina  If after stopping florinef bp is running low after stopping florinef may have to go back on it or do something else but I would rather stop metoprolol in that case as he  Never had MI and there is no strong indication of metoprolol     Diabetes mellitus ([facility] [address]   Followed closely by Dr. Lizzette Mcgee     Dyslipidemia :  All available lab work was reviewed. Patient was advised to repeat lab work before next visit. Necessary orders were placed , instructions given by myself       Counseled extensively and medication compliance urged. We discussed that for the  prevention of ASCVD our  goal is aggressive risk modification. Patient is encouraged to exercise if they can , educated about  brisk walk for 30 minutes  at least 3 to 4 times a week if there are no physical limitations  Various goals were discussed and questions answered. Continue current medications. Appropriate prescriptions are addressed and refills ordered. Questions answered and patient verbalizes understanding. Call for any problems, questions, or concerns. Greater than 60 % of time spent counseling besides reviewing data and images     Continue all other medications of all above medical condition listed as is. Return in about 6 months (around 8/14/2022). Please note this report has been partially produced using speech recognition software and may contain errors related to that system including errors in grammar, punctuation, and spelling, as well as words and phrases that may be inappropriate. If there are any questions or concerns please feel free to contact the dictating provider for clarification.

## 2022-02-14 NOTE — LETTER
Diamond Pelletier  1945  [id number]    Have you had any Chest Pain that is not new? - No    Have you had any Shortness of Breath - No    Have you had any dizziness - No    Have you had any palpitations that are not new?  - No    Do you have any edema - No             When did you have your last labs drawn 9/14/21    Do you have a surgery or procedure scheduled in the near future - No

## 2022-02-14 NOTE — PATIENT INSTRUCTIONS
Please be informed that if you contact our office outside of normal business hours the physician on call cannot help with any scheduling or rescheduling issues, procedure instruction questions or any type of medication issue. We advise you for any urgent/emergency that you go to the nearest emergency room! PLEASE CALL OUR OFFICE DURING NORMAL BUSINESS HOURS    Monday - Friday   8 am to 5 pm    [place]: [address]: [phone]    [place]:  [phone]/    **It is YOUR responsibilty to bring medication bottles and/or updated medication list to [address] [place] [address].  This will allow us to better serve you and all your healthcare needs**

## 2022-03-21 DIAGNOSIS — E11.9 TYPE 2 DIABETES MELLITUS WITHOUT COMPLICATION, WITHOUT LONG-TERM CURRENT USE OF INSULIN (HCC): ICD-10-CM

## 2022-03-21 DIAGNOSIS — I10 PRIMARY HYPERTENSION: ICD-10-CM

## 2022-03-21 DIAGNOSIS — R07.9 CHEST PAIN, UNSPECIFIED TYPE: ICD-10-CM

## 2022-03-21 DIAGNOSIS — I95.1 ORTHOSTATIC HYPOTENSION: ICD-10-CM

## 2022-03-21 DIAGNOSIS — E78.2 MIXED HYPERLIPIDEMIA: ICD-10-CM

## 2022-03-21 LAB
CHOLESTEROL, TOTAL: 131 MG/DL (ref 0–199)
HDLC SERPL-MCNC: 38 MG/DL (ref 40–60)
LDL CHOLESTEROL CALCULATED: 68 MG/DL
TRIGL SERPL-MCNC: 127 MG/DL (ref 0–150)
VLDLC SERPL CALC-MCNC: 25 MG/DL

## 2022-08-23 ENCOUNTER — HOSPITAL ENCOUNTER (OUTPATIENT)
Age: 77
Discharge: HOME | End: 2022-08-23
Payer: MEDICARE

## 2022-08-23 ENCOUNTER — OFFICE VISIT (OUTPATIENT)
Dept: CARDIOLOGY CLINIC | Age: 77
End: 2022-08-23
Payer: MEDICARE

## 2022-08-23 VITALS
SYSTOLIC BLOOD PRESSURE: 136 MMHG | HEART RATE: 72 BPM | HEIGHT: 69 IN | WEIGHT: 193.8 LBS | BODY MASS INDEX: 28.71 KG/M2 | DIASTOLIC BLOOD PRESSURE: 86 MMHG

## 2022-08-23 DIAGNOSIS — R55 SYNCOPE, UNSPECIFIED SYNCOPE TYPE: ICD-10-CM

## 2022-08-23 DIAGNOSIS — E78.2 MIXED HYPERLIPIDEMIA: ICD-10-CM

## 2022-08-23 DIAGNOSIS — E11.9 TYPE 2 DIABETES MELLITUS WITHOUT COMPLICATION, WITHOUT LONG-TERM CURRENT USE OF INSULIN (HCC): ICD-10-CM

## 2022-08-23 DIAGNOSIS — R07.9 CHEST PAIN, UNSPECIFIED TYPE: ICD-10-CM

## 2022-08-23 DIAGNOSIS — I25.10 CAD IN NATIVE ARTERY: Primary | ICD-10-CM

## 2022-08-23 DIAGNOSIS — I95.1 ORTHOSTATIC HYPOTENSION: ICD-10-CM

## 2022-08-23 DIAGNOSIS — I10 PRIMARY HYPERTENSION: ICD-10-CM

## 2022-08-23 LAB — TSH HIGH SENSITIVITY: 1.98 UIU/ML (ref 0.27–4.2)

## 2022-08-23 PROCEDURE — 3017F COLORECTAL CA SCREEN DOC REV: CPT | Performed by: INTERNAL MEDICINE

## 2022-08-23 PROCEDURE — 3046F HEMOGLOBIN A1C LEVEL >9.0%: CPT | Performed by: INTERNAL MEDICINE

## 2022-08-23 PROCEDURE — G8417 CALC BMI ABV UP PARAM F/U: HCPCS | Performed by: INTERNAL MEDICINE

## 2022-08-23 PROCEDURE — 2022F DILAT RTA XM EVC RTNOPTHY: CPT | Performed by: INTERNAL MEDICINE

## 2022-08-23 PROCEDURE — 99214 OFFICE O/P EST MOD 30 MIN: CPT | Performed by: INTERNAL MEDICINE

## 2022-08-23 PROCEDURE — 36415 COLL VENOUS BLD VENIPUNCTURE: CPT

## 2022-08-23 PROCEDURE — 1036F TOBACCO NON-USER: CPT | Performed by: INTERNAL MEDICINE

## 2022-08-23 PROCEDURE — G8427 DOCREV CUR MEDS BY ELIG CLIN: HCPCS | Performed by: INTERNAL MEDICINE

## 2022-08-23 PROCEDURE — 1124F ACP DISCUSS-NO DSCNMKR DOCD: CPT | Performed by: INTERNAL MEDICINE

## 2022-08-23 PROCEDURE — 84443 ASSAY THYROID STIM HORMONE: CPT

## 2022-08-23 RX ORDER — FINASTERIDE 5 MG/1
5 TABLET, FILM COATED ORAL DAILY
COMMUNITY
Start: 2021-12-22 | End: 2022-12-17

## 2022-08-23 RX ORDER — TADALAFIL 5 MG/1
TABLET ORAL
COMMUNITY
Start: 2022-06-07

## 2022-08-23 NOTE — PROGRESS NOTES
CARDIOLOGY   NOTE    Chief Complaint: Chest pain abnormal stress test    HPI:   Musa Rocha is a 78y.o. year old who has Past medical history as noted below. He says he is better,bp  is improved not on midodrine any more , stopped flomax . His wife is concerned that he does not have enough energy   Romulo has  Coronary artery bypass grafting x4, left internal mammary artery,sequenced to diagonal branch artery and LAD. Reverse saphenous vein,graft anastomosed to obtuse marginal 1 artery, reverse saphenous vein,graft anastomosed to distal right coronary artery in September 2021 . Diabetes is better after staring [employer] and [place]   He says dizziness is  Better . EKG today shows sinus rhythm  Due to ongoing palpitations he had Holter monitor in July 2020 which did not show any significant arrhythmias. Blood pressures been running in 120's  at home  He is on nitro as needed but has not needed to use it so far      Current Outpatient Medications   Medication Sig Dispense Refill    finasteride (PROSCAR) 5 MG tablet Take 5 mg by mouth daily      tadalafil (CIALIS) 5 MG tablet TAKE 1 TABLET BY MOUTH DAILY.       metoprolol tartrate (LOPRESSOR) 25 MG tablet Take 0.5 tablets by mouth 2 times daily 180 tablet 3    metFORMIN (GLUCOPHAGE) 1000 MG tablet Take 1 tablet by mouth 2 times daily (with meals) 60 tablet 3    latanoprost (XALATAN) 0.005 % ophthalmic solution Apply 1 drop to eye daily      pravastatin (PRAVACHOL) 40 MG tablet 1 tablet daily      tamsulosin (FLOMAX) 0.4 MG capsule Take 0.8 mg by mouth daily      JANUVIA 100 MG tablet 1 tablet daily      Coenzyme Q10 (CO Q 10 PO) Take by mouth daily      Multiple Vitamins-Minerals (OCUVITE ADULT 50+ PO) Take by mouth daily      glimepiride (AMARYL) 4 MG tablet Take 4 mg by mouth every morning (before breakfast)      aspirin 81 MG tablet Take 81 mg by mouth daily      Multiple Vitamins-Minerals (CENTRUM SILVER 50+MEN PO) Take by mouth       No current facility-administered medications for this visit. Allergies:   Clarithromycin, Levaquin [levofloxacin], and Bicillin [penicillin g benzathine]    Patient History:  Past Medical History:   Diagnosis Date    Abnormal Heart Score CT 03/04/2005    Arthritis     CAD (coronary artery disease)     Cancer ([facility] [address] 2019 and 2020    Bladder    Diabetes mellitus ([facility] [address]     Elevated d-dimer 01/16/2020    Fatigue 06/18/2018    H/O cardiac catheterization 09/08/2021    STENOSIS TO : LAD, OM, CIRC, PDA & RCA, CABG evaluation. H/O echocardiogram 01/27/2020    EF 98-04, Grade I diastolic dysfunction. History of exercise stress test 01/27/2020    Treadmill, normal stress test, THR achieved    History of nuclear stress test 08/23/2021    EF 55%, ETT negative for ischemia/arrhythmia, Medium sized area of moderate inferior wall ischemia    Hyperlipidemia     Hypertension     Obesity 02/15/2000    Psoriasis 03/09/2004     Past Surgical History:   Procedure Laterality Date    BLADDER SURGERY      remove lesion 2019 and 2020    BREAST CYST INCISION AND DRAINAGE Right 2013    CHOLECYSTECTOMY  1999    COLONOSCOPY  10/22/2003    polyp x1    COLONOSCOPY  2010    Normal exam    COLONOSCOPY  12/05/2017    single 2mm polyp found in proximal ascending colon, diverticulosis found in sigmoid colon    CORONARY ARTERY BYPASS GRAFT N/A 9/10/2021    CABG CORONARY ARTERY BYPASS performed by Zoraida Arreola MD at [facility]    tibia    FRACTURE SURGERY Left 1994    shoulder    FRACTURE SURGERY  2011    Nose & facial bones    HERNIA REPAIR  [address]    Umbilical     Family History   Problem Relation Age of Onset    Stroke Mother     Diabetes Father     Diabetes Brother     Diabetes Paternal Grandmother     Diabetes Brother     Diabetes Brother      Social History     Tobacco Use    Smoking status: Former     Types: Cigars    Smokeless tobacco: Never    Tobacco comments:     also former cigarette smoker.  8/30/2021 Substance Use Topics    Alcohol use: Not Currently        Review of Systems:   Constitutional: No Fever or Weight Loss   Eyes: No Decreased Vision  ENT: No Headaches, Hearing Loss or Vertigo  Cardiovascular: as per note above   Respiratory: No cough or wheezing and as per note above. Gastrointestinal: No abdominal pain, appetite loss, blood in stools, constipation, diarrhea or heartburn  Genitourinary: No dysuria, trouble voiding, or hematuria  Musculoskeletal:  denies any new  joint aches , swelling  or pain   Integumentary: No rash or pruritis  Neurological: No TIA or stroke symptoms  Psychiatric: No anxiety or depression  Endocrine: No malaise, fatigue or temperature intolerance  Hematologic/Lymphatic: No bleeding problems, blood clots or swollen lymph nodes  Allergic/Immunologic: No nasal congestion or hives    Objective:      Physical Exam:  /86   Pulse 72   Ht 5' 9\" (1.753 m)   Wt 193 lb 12.8 oz (87.9 kg)   BMI 28.62 kg/m²   Wt Readings from Last 3 Encounters:   08/23/22 193 lb 12.8 oz (87.9 kg)   02/14/22 185 lb (83.9 kg)   11/16/21 184 lb (83.5 kg)     Body mass index is 28.62 kg/m². Vitals:    08/23/22 1220   BP: 136/86   Pulse: 72        General Appearance:  No distress, conversant  Constitutional:  Well developed, Well nourished, No acute distress, Non-toxic appearance. HENT:  Normocephalic, Atraumatic, Bilateral external ears normal, Oropharynx moist, No oral exudates, Nose normal. Neck- Normal range of motion, No tenderness, Supple, No stridor,no apical-carotid delay  Eyes:  PERRL, EOMI, Conjunctiva normal, No discharge. Respiratory:  Normal breath sounds, No respiratory distress, No wheezing, No chest tenderness. ,no use of accessory muscles, NO crackles  Cardiovascular: (PMI) apex non displaced,no lifts no thrills,S1 and S2 audible, No added heart sounds, No signs of ankle edema, or volume overload, No evidence of JVD, No crackles  GI:  Bowel sounds normal, Soft, No tenderness, No masses, No gross visceromegaly   :  No costovertebral angle tenderness   Musculoskeletal:  No edema, no tenderness, no deformities. Back- no tenderness  Integument:  Well hydrated, no rash   Lymphatic:  No lymphadenopathy noted   Neurologic:  Alert & oriented x 3, CN 2-12 normal, normal motor function, normal sensory function, no focal deficits noted   Psychiatric:  Speech and behavior appropriate       Medical decision making and Data review:  DATA:  No results found for: TROPONINT  BNP:  No results found for: PROBNP  PT/INR:  No results found for: PTINR  Lab Results   Component Value Date    LABA1C 7.5 (H) 09/08/2021     Lab Results   Component Value Date    CHOL 131 03/21/2022    TRIG 127 03/21/2022    HDL 38 (L) 03/21/2022    LDLCALC 68 03/21/2022     Lab Results   Component Value Date    ALT 55 07/30/2021    AST 51 07/30/2021     No results for input(s): WBC, HGB, HCT, MCV, PLT in the last 72 hours. TSH: No results found for: TSH  Lab Results   Component Value Date    AST 51 07/30/2021    ALT 55 07/30/2021    BILITOT 0.6 07/30/2021    ALKPHOS 204 07/30/2021     No results found for: PROBNP  Lab Results   Component Value Date    LABA1C 7.5 (H) 09/08/2021     Lab Results   Component Value Date    WBC 7.3 09/13/2021    HGB 8.5 (L) 09/13/2021    HCT 26.0 (L) 09/13/2021    PLT 94 (L) 09/13/2021     Stress test  8/23/21      Summary    Abnormal Study. Limited exercise capacity. 5 METs work load. Physiological BP response to exercise. ETT negative for Ischemia / Arrhythmia. Medium sized area of moderate inferior wall Ischemia. Normal LV function. LVEF is 55 %. Supervising physician Dr. Earlene Kelly . Cath      Procedure Summary   Access : radial Indication : abnormal stress test   1. LAD has proximal has 70-80 % lesion , mid LAD has 90 % lesion   , diffuse Diag disease   2. OM has 70-80 % lesion , Circ has 90 % lesion   3.  RCA has proximal 80-90 % lesion , PDA has 90 % lesion    Echo 9/8/21 Conclusions      Summary   Left ventricular systolic function is normal.   Ejection fraction is visually estimated at 50-55%. Mild left ventricular hypertrophy. Indeterminate diastolic function; E/A flow reversal is noted. Sclerotic, but non-stenotic aortic valve. No evidence of any pericardial effusion. All labs, medications and tests reviewed by myself including data and history from outside source , patient and available family . Assessment & Plan:      1. CAD in native artery    2. Chest pain, unspecified type    3. Type 2 diabetes mellitus without complication, without long-term current use of insulin ([facility]    4. Mixed hyperlipidemia    5. Primary hypertension    6. Orthostatic hypotension    7. Syncope, unspecified syncope type         ASCVD  S/p CABG with LIMA to LAD and Diag, SVG to OM and SVG to PDA in sept 2021. . On  metoprolol 12.5 twice daily , cannot tolerate higher dose . He bp is better     Hypertension   Advised to check blood pressure at home . metoprolol 12.5 twice daily for cardiac protection and to help with angina  Never had MI and there is no strong indication of metoprolol     Lack of energy  Will refer to sleep apnea eval    Diabetes mellitus ([facility]   Followed closely by Dr. Richard Collado     Dyslipidemia :  All available lab work was reviewed. Patient was advised to repeat lab work before next visit. Necessary orders were placed , instructions given by myself   LDL is in 63's       Counseled extensively and medication compliance urged. We discussed that for the  prevention of ASCVD our  goal is aggressive risk modification. Patient is encouraged to exercise if they can , educated about  brisk walk for 30 minutes  at least 3 to 4 times a week if there are no physical limitations  Various goals were discussed and questions answered. Continue current medications. Appropriate prescriptions are addressed and refills ordered.   Questions answered and patient verbalizes understanding. Call for any problems, questions, or concerns. Greater than 60 % of time spent counseling besides reviewing data and images     Continue all other medications of all above medical condition listed as is. No follow-ups on file. Please note this report has been partially produced using speech recognition software and may contain errors related to that system including errors in grammar, punctuation, and spelling, as well as words and phrases that may be inappropriate. If there are any questions or concerns please feel free to contact the dictating provider for clarification.

## 2022-09-07 ENCOUNTER — HOSPITAL ENCOUNTER (OUTPATIENT)
Dept: SLEEP CENTER | Age: 77
Discharge: HOME | End: 2022-09-07
Payer: MEDICARE

## 2022-09-07 VITALS
HEIGHT: 69 IN | WEIGHT: 185 LBS | OXYGEN SATURATION: 98 % | SYSTOLIC BLOOD PRESSURE: 191 MMHG | DIASTOLIC BLOOD PRESSURE: 90 MMHG | BODY MASS INDEX: 27.4 KG/M2 | HEART RATE: 63 BPM

## 2022-09-07 DIAGNOSIS — G47.10 HYPERSOMNIA: ICD-10-CM

## 2022-09-07 DIAGNOSIS — E66.3 OVERWEIGHT (BMI 25.0-29.9): ICD-10-CM

## 2022-09-07 DIAGNOSIS — Z87.891 EX-SMOKER: ICD-10-CM

## 2022-09-07 DIAGNOSIS — G47.33 OSA (OBSTRUCTIVE SLEEP APNEA): ICD-10-CM

## 2022-09-07 PROCEDURE — 99211 OFF/OP EST MAY X REQ PHY/QHP: CPT

## 2022-09-07 PROCEDURE — 99204 OFFICE O/P NEW MOD 45 MIN: CPT | Performed by: INTERNAL MEDICINE

## 2022-09-07 ASSESSMENT — SLEEP AND FATIGUE QUESTIONNAIRES
HOW LIKELY ARE YOU TO NOD OFF OR FALL ASLEEP WHILE WATCHING TV: 1
HOW LIKELY ARE YOU TO NOD OFF OR FALL ASLEEP WHILE SITTING AND READING: 0
ESS TOTAL SCORE: 2
HOW LIKELY ARE YOU TO NOD OFF OR FALL ASLEEP WHILE SITTING QUIETLY AFTER LUNCH WITHOUT ALCOHOL: 0
HOW LIKELY ARE YOU TO NOD OFF OR FALL ASLEEP WHILE SITTING INACTIVE IN A PUBLIC PLACE: 0
HOW LIKELY ARE YOU TO NOD OFF OR FALL ASLEEP IN A CAR, WHILE STOPPED FOR A FEW MINUTES IN TRAFFIC: 0
HOW LIKELY ARE YOU TO NOD OFF OR FALL ASLEEP WHILE LYING DOWN TO REST IN THE AFTERNOON WHEN CIRCUMSTANCES PERMIT: 0
HOW LIKELY ARE YOU TO NOD OFF OR FALL ASLEEP WHILE SITTING AND TALKING TO SOMEONE: 0
HOW LIKELY ARE YOU TO NOD OFF OR FALL ASLEEP WHEN YOU ARE A PASSENGER IN A CAR FOR AN HOUR WITHOUT A BREAK: 1

## 2022-09-07 NOTE — CONSULTS
Delfin Piña MD, Mellissa Matute MD, Ila Khan MD, [facility]      [address], [address] [facility]   [address]: [phone]  F: [phone]     Subjective:     Patient ID: Hima Williamson is a 78 y.o. male, referred to the sleep center for   Chief Complaint   Patient presents with    Chest Pain    Diabetes    Other     Atherosclerotic heart disease of native coronary artery without angina pectoris, Hypertensive heart disease with heart failure, Orthostatic hypotension   .     Referring physician:  Douglas Shah MD    History:has been referred for the LIMA    Symptoms:   [x]  Snoring                                                                    [x]  Dry Mouth  []  Choking                                                                   []  Morning Headaches  []  Gasping for Air                                                        []  Trouble Falling asleep  []  Tired during the daytime                                         []  Trouble Staying Asleep  [x]  Tired when you wake up                                         []  Weight Gain in Last 5 Years  [x]  Wake up frequently at night                                    []  Weight Loss in Last 5 Years  []  Shortness Of Breath                                               []  Shift Worker  []  Coughing                                                                [x]  Smoker (Previous or Current) 1 pk/day x 15 yrs and then cigars occasionally quit more than 1 yr ago  []  Chest Pain                                                              []  Anxiety  []  Trouble keeping your legs still at night                   []  Depression  []  Kicking your legs in your sleep                               []  Insomnia     [] Palpitation  [x]   Stop breathing      []  Other:     Significant Co-morbidities:  []  Congestive Heart Failure     []  COPD         []  Stroke daily   Yes Historical Provider, MD   Multiple Vitamins-Minerals (OCUVITE ADULT 50+ PO) Take by mouth daily   Yes Historical Provider, MD   glimepiride (AMARYL) 4 MG tablet Take 4 mg by mouth every morning (before breakfast)   Yes Historical Provider, MD   aspirin 81 MG tablet Take 81 mg by mouth daily   Yes Historical Provider, MD   Multiple Vitamins-Minerals (CENTRUM SILVER 50+MEN PO) Take by mouth   Yes Historical Provider, MD       Allergies as of 09/07/2022 - Fully Reviewed 09/07/2022   Allergen Reaction Noted    Clarithromycin  07/19/2021    Levaquin [levofloxacin] Hives 08/30/2021    Bicillin [penicillin g benzathine] Rash 11/29/2017       Patient Active Problem List   Diagnosis    Chest pain    Syncope    Diabetes mellitus ([facility] [address]    Hyperlipidemia    Hypertension    CAD in native artery    Pulmonary nodule    Orthostatic hypotension    LIMA (obstructive sleep apnea)    Hypersomnia    Overweight (BMI 25.0-29. 9)    Ex-smoker       Past Medical History:   Diagnosis Date    Abnormal Heart Score CT 03/04/2005    Arthritis     CAD (coronary artery disease)     Cancer ([facility] [address] 2019 and 2020    Bladder    Diabetes mellitus ([facility] [address]     Elevated d-dimer 01/16/2020    Fatigue 06/18/2018    H/O cardiac catheterization 09/08/2021    STENOSIS TO : LAD, OM, CIRC, PDA & RCA, CABG evaluation. H/O echocardiogram 01/27/2020    EF 53-74, Grade I diastolic dysfunction.     History of exercise stress test 01/27/2020    Treadmill, normal stress test, THR achieved    History of nuclear stress test 08/23/2021    EF 55%, ETT negative for ischemia/arrhythmia, Medium sized area of moderate inferior wall ischemia    Hyperlipidemia     Hypertension     Obesity 02/15/2000    Psoriasis 03/09/2004       Past Surgical History:   Procedure Laterality Date    BLADDER SURGERY      remove lesion 2019 and [address]    COLONOSCOPY  10/22/2003    polyp x1    COLONOSCOPY  2010    Normal exam    COLONOSCOPY  12/05/2017    single 2mm polyp found in proximal ascending colon, diverticulosis found in sigmoid colon    CORONARY ARTERY BYPASS GRAFT N/A 9/10/2021    CABG CORONARY ARTERY BYPASS performed by Steve Veliz MD at [facility] Right 2013    FRACTURE SURGERY Right 1969    tibia    FRACTURE SURGERY Left 1994    shoulder    FRACTURE SURGERY  2011    Nose & facial bones    HERNIA REPAIR  0068    Umbilical       Family History   Problem Relation Age of Onset    Stroke Mother     Diabetes Father     Diabetes Brother     Diabetes Paternal Grandmother     Diabetes Brother     Diabetes Brother          Objective:   BP (!) 191/90   Pulse 63   Ht 5' 9\" (1.753 m)   Wt 185 lb (83.9 kg)   SpO2 98%   BMI 27.32 kg/m²   Body mass index is 27.32 kg/m². Sleep Medicine 9/7/2022   Sitting and reading 0   Watching TV 1   Sitting, inactive in a public place (e.g. a theatre or a meeting) 0   As a passenger in a car for an hour without a break 1   Lying down to rest in the afternoon when circumstances permit 0   Sitting and talking to someone 0   Sitting quietly after a lunch without alcohol 0   In a car, while stopped for a few minutes in traffic 0   [place] Sleepiness Score 2   Neck circumference (Inches) 15.75     Mallampati 3    Vitals:    09/07/22 1521   BP: (!) 191/90   Pulse: 63   SpO2: 98%   Weight: 185 lb (83.9 kg)   Height: 5' 9\" (1.753 m)     Neck circumference (Inches): 15.75  Inches  [place] Sleepiness Score: 2    Gen: No distress. Eyes: PERRL. No sclera icterus. No conjunctival injection. ENT: No discharge. Pharynx clear. External appearance of ears and nose normal.OVERJET  Neck: Trachea midline. No obvious mass. Resp: No accessory muscle use. No crackles. No wheezes. No rhonchi. No dullness on percussion. CV: Regular rate. Regular rhythm. No murmur or rub. No edema. GI: Non-tender. Non-distended. No hernia. Skin: Warm, dry, normal texture and turgor. No nodule on exposed extremities.    Lymph: No

## 2022-11-01 ENCOUNTER — HOSPITAL ENCOUNTER (OUTPATIENT)
Dept: SLEEP CENTER | Age: 77
Discharge: HOME | End: 2022-11-01
Payer: MEDICARE

## 2022-11-01 DIAGNOSIS — G47.33 OSA (OBSTRUCTIVE SLEEP APNEA): ICD-10-CM

## 2022-11-01 PROCEDURE — 95810 POLYSOM 6/> YRS 4/> PARAM: CPT

## 2022-11-02 PROCEDURE — 95810 POLYSOM 6/> YRS 4/> PARAM: CPT | Performed by: INTERNAL MEDICINE

## 2022-11-02 NOTE — PROGRESS NOTES
11/1/2022  sleep study  for Ivone Hirsch  1945 is complete. Results are pending physician review.     Electronically signed by Ramez Dunbar on 11/1/2022 at 8:21 PM

## 2022-11-10 ENCOUNTER — HOSPITAL ENCOUNTER (OUTPATIENT)
Dept: SLEEP CENTER | Age: 77
Discharge: HOME | End: 2022-11-10
Payer: MEDICARE

## 2022-11-10 DIAGNOSIS — G47.33 OSA (OBSTRUCTIVE SLEEP APNEA): ICD-10-CM

## 2022-11-10 DIAGNOSIS — E66.3 OVERWEIGHT (BMI 25.0-29.9): ICD-10-CM

## 2022-11-10 DIAGNOSIS — G47.10 HYPERSOMNIA: ICD-10-CM

## 2022-11-10 DIAGNOSIS — Z87.891 EX-SMOKER: ICD-10-CM

## 2022-11-10 PROCEDURE — 99214 OFFICE O/P EST MOD 30 MIN: CPT | Performed by: INTERNAL MEDICINE

## 2022-11-10 PROCEDURE — 9990000010 HC NO CHARGE VISIT

## 2022-11-10 ASSESSMENT — ENCOUNTER SYMPTOMS
EYE DISCHARGE: 0
ABDOMINAL DISTENTION: 0
SHORTNESS OF BREATH: 0
BACK PAIN: 0
ABDOMINAL PAIN: 0
COUGH: 0
EYE ITCHING: 0

## 2022-11-10 NOTE — PROGRESS NOTES
Moustapha Chew  [birth year]  Referring Provider: Candi Cool MD    Subjective:     Chief Complaint   Patient presents with    2 Week Follow-Up    Sleep Apnea     G47.33       HPI  Apple Temple is a 78 y.o. male is doing a telephone follow up visit. He had a PSG done on 11/01/22 showed that he has mild LIMA with an AHI of 5.8 abd desat to 89%. He has no loss of weight. He has EDS. He CAD s/p CABG x 4. He is willing to try MAD. Current Outpatient Medications   Medication Sig Dispense Refill    finasteride (PROSCAR) 5 MG tablet Take 5 mg by mouth daily      tadalafil (CIALIS) 5 MG tablet TAKE 1 TABLET BY MOUTH DAILY. metoprolol tartrate (LOPRESSOR) 25 MG tablet Take 0.5 tablets by mouth 2 times daily 180 tablet 3    metFORMIN (GLUCOPHAGE) 1000 MG tablet Take 1 tablet by mouth 2 times daily (with meals) 60 tablet 3    latanoprost (XALATAN) 0.005 % ophthalmic solution Apply 1 drop to eye daily      pravastatin (PRAVACHOL) 40 MG tablet 1 tablet daily      JANUVIA 100 MG tablet 1 tablet daily      Coenzyme Q10 (CO Q 10 PO) Take by mouth daily      Multiple Vitamins-Minerals (OCUVITE ADULT 50+ PO) Take by mouth daily      glimepiride (AMARYL) 4 MG tablet Take 4 mg by mouth every morning (before breakfast)      Multiple Vitamins-Minerals (CENTRUM SILVER 50+MEN PO) Take by mouth      aspirin 81 MG tablet Take 81 mg by mouth daily (Patient not taking: Reported on 11/10/2022)       No current facility-administered medications for this encounter.        Allergies   Allergen Reactions    Clarithromycin     Levaquin [Levofloxacin] Hives    Bicillin [Penicillin G Benzathine] Rash       Past Medical History:   Diagnosis Date    Abnormal Heart Score CT 03/04/2005    Arthritis     CAD (coronary artery disease)     Cancer ([facility] [address] 2019 and 2020    Bladder    Diabetes mellitus ([facility] [address]     Elevated d-dimer 01/16/2020    Fatigue 06/18/2018    H/O cardiac catheterization 09/08/2021    STENOSIS TO : LAD, OM, CIRC, PDA & RCA, CABG evaluation. H/O echocardiogram 01/27/2020    EF 65-89, Grade I diastolic dysfunction. History of exercise stress test 01/27/2020    Treadmill, normal stress test, THR achieved    History of nuclear stress test 08/23/2021    EF 55%, ETT negative for ischemia/arrhythmia, Medium sized area of moderate inferior wall ischemia    Hyperlipidemia     Hypertension     Obesity 02/15/2000    Psoriasis 03/09/2004       Past Surgical History:   Procedure Laterality Date    BLADDER SURGERY      remove lesion 2019 and 2020    CHOLECYSTECTOMY  1999    COLONOSCOPY  10/22/2003    polyp x1    COLONOSCOPY  2010    Normal exam    COLONOSCOPY  12/05/2017    single 2mm polyp found in proximal ascending colon, diverticulosis found in sigmoid colon    CORONARY ARTERY BYPASS GRAFT N/A 9/10/2021    CABG CORONARY ARTERY BYPASS performed by Fran Montejo MD at [facility] Right 2013    FRACTURE SURGERY Right 1969    tibia    FRACTURE SURGERY Left 1994    shoulder    FRACTURE SURGERY  2011    Nose & facial bones    HERNIA REPAIR  [id number]    Umbilical       Social History     Socioeconomic History    Marital status:    Tobacco Use    Smoking status: Former     Types: Cigars    Smokeless tobacco: Never    Tobacco comments:     also former cigarette smoker. 8/30/2021   Substance and Sexual Activity    Alcohol use: Not Currently    Drug use: No       Review of Systems   Constitutional:  Positive for fatigue. HENT:  Negative for congestion and postnasal drip. Eyes:  Negative for discharge and itching. Respiratory:  Negative for cough and shortness of breath. Cardiovascular:  Negative for chest pain and leg swelling. Gastrointestinal:  Negative for abdominal distention and abdominal pain. Endocrine: Negative for cold intolerance and heat intolerance. Genitourinary:  Negative for enuresis and frequency. Musculoskeletal:  Negative for arthralgias and back pain.    Allergic/Immunologic: Negative for environmental allergies and food allergies. Neurological:  Negative for light-headedness and headaches. Hematological:  Negative for adenopathy. Psychiatric/Behavioral:  Negative for agitation and behavioral problems. Objective: There were no vitals taken for this visit. There is no height or weight on file to calculate BMI. Sleep Medicine 9/7/2022   Sitting and reading 0   Watching TV 1   Sitting, inactive in a public place (e.g. a theatre or a meeting) 0   As a passenger in a car for an hour without a break 1   Lying down to rest in the afternoon when circumstances permit 0   Sitting and talking to someone 0   Sitting quietly after a lunch without alcohol 0   In a car, while stopped for a few minutes in traffic 0   [place] Sleepiness Score 2   Neck circumference (Inches) 15.75       Radiology: None    Assessment and Plan     Problem List          Respiratory    LIMA (obstructive sleep apnea)      He has mild LIMA with EDS and CAD  Patient wants to try MAD before considering CPAP  Loose weight              Other    Hypersomnia      He has mild LIMA with EDS and CAD  Patient wants to try MAD before considering CPAP  Loose weight             Overweight (BMI 25.0-29. 9)      Loose weight         Ex-smoker      Advised to c/w quitting smoking                 No follow-ups on file. Follow-Up:    No follow-ups on file. Progress notes sent to the referring Provider    Dhara Desir is a 78 y.o. male being evaluated by a Virtual Visit (video visit) encounter to address concerns as mentioned above. A caregiver was present when appropriate. Due to this being a TeleHealth encounter (During IXIIL-06 public health emergency), evaluation of the following organ systems was limited: Vitals/Constitutional/EENT/Resp/CV/GI//MS/Neuro/Skin/Heme-Lymph-Imm.   Pursuant to the emergency declaration under the [address] [facility] [place], [address] and Response Supplemental Appropriations Act, this Virtual Visit was conducted with patient's (and/or legal guardian's) consent, to reduce the patient's risk of exposure to COVID-19 and provide necessary medical care. The patient (and/or legal guardian) has also been advised to contact this office for worsening conditions or problems, and seek emergency medical treatment and/or call 911 if deemed necessary. Patient identification was verified at the start of the visit: Yes    Total time spent for this encounter:     Services were provided through a video synchronous discussion virtually to substitute for in-person clinic visit. Patient and provider were located at their individual homes. --Fiona Martinez MD on 11/10/2022 at 11:34 AM    An electronic signature was used to authenticate this note.      Fiona Martinez MD MD  11/10/2022  11:34 AM

## 2022-11-13 ENCOUNTER — APPOINTMENT (OUTPATIENT)
Dept: CT IMAGING | Age: 77
End: 2022-11-13
Payer: MEDICARE

## 2022-11-13 ENCOUNTER — HOSPITAL ENCOUNTER (OUTPATIENT)
Age: 77
Setting detail: OBSERVATION
Discharge: HOME | End: 2022-11-14
Attending: EMERGENCY MEDICINE | Admitting: STUDENT IN AN ORGANIZED HEALTH CARE EDUCATION/TRAINING PROGRAM
Payer: MEDICARE

## 2022-11-13 DIAGNOSIS — R77.8 ELEVATED TROPONIN: ICD-10-CM

## 2022-11-13 DIAGNOSIS — I95.1 ORTHOSTATIC HYPOTENSION: ICD-10-CM

## 2022-11-13 DIAGNOSIS — S09.90XA CLOSED HEAD INJURY, INITIAL ENCOUNTER: ICD-10-CM

## 2022-11-13 DIAGNOSIS — T78.2XXA ANAPHYLAXIS, INITIAL ENCOUNTER: Primary | ICD-10-CM

## 2022-11-13 DIAGNOSIS — S20.212A CONTUSION OF LEFT CHEST WALL, INITIAL ENCOUNTER: ICD-10-CM

## 2022-11-13 DIAGNOSIS — K21.00 GASTROESOPHAGEAL REFLUX DISEASE WITH ESOPHAGITIS WITHOUT HEMORRHAGE: ICD-10-CM

## 2022-11-13 DIAGNOSIS — R55 SYNCOPE AND COLLAPSE: ICD-10-CM

## 2022-11-13 LAB
ALBUMIN SERPL-MCNC: 4 GM/DL (ref 3.4–5)
ALP BLD-CCNC: 71 IU/L (ref 40–129)
ALT SERPL-CCNC: 40 U/L (ref 10–40)
ANION GAP SERPL CALCULATED.3IONS-SCNC: 13 MMOL/L (ref 4–16)
AST SERPL-CCNC: 45 IU/L (ref 15–37)
BASOPHILS ABSOLUTE: 0 K/CU MM
BASOPHILS RELATIVE PERCENT: 0.3 % (ref 0–1)
BILIRUB SERPL-MCNC: 0.3 MG/DL (ref 0–1)
BUN BLDV-MCNC: 17 MG/DL (ref 6–23)
CALCIUM SERPL-MCNC: 9.4 MG/DL (ref 8.3–10.6)
CHLORIDE BLD-SCNC: 100 MMOL/L (ref 99–110)
CO2: 23 MMOL/L (ref 21–32)
CREAT SERPL-MCNC: 0.8 MG/DL (ref 0.9–1.3)
DIFFERENTIAL TYPE: ABNORMAL
EOSINOPHILS ABSOLUTE: 0.2 K/CU MM
EOSINOPHILS RELATIVE PERCENT: 2.6 % (ref 0–3)
GFR SERPL CREATININE-BSD FRML MDRD: >60 ML/MIN/1.73M2
GLUCOSE BLD-MCNC: 162 MG/DL
GLUCOSE BLD-MCNC: 162 MG/DL (ref 70–99)
GLUCOSE BLD-MCNC: 213 MG/DL (ref 70–99)
HCT VFR BLD CALC: 45.1 % (ref 42–52)
HEMOGLOBIN: 15 GM/DL (ref 13.5–18)
IMMATURE NEUTROPHIL %: 0.5 % (ref 0–0.43)
LYMPHOCYTES ABSOLUTE: 1.9 K/CU MM
LYMPHOCYTES RELATIVE PERCENT: 31.8 % (ref 24–44)
MCH RBC QN AUTO: 29.9 PG (ref 27–31)
MCHC RBC AUTO-ENTMCNC: 33.3 % (ref 32–36)
MCV RBC AUTO: 90 FL (ref 78–100)
MONOCYTES ABSOLUTE: 0.5 K/CU MM
MONOCYTES RELATIVE PERCENT: 8.6 % (ref 0–4)
NUCLEATED RBC %: 0 %
PDW BLD-RTO: 12.5 % (ref 11.7–14.9)
PLATELET # BLD: 184 K/CU MM (ref 140–440)
PMV BLD AUTO: 11.5 FL (ref 7.5–11.1)
POTASSIUM SERPL-SCNC: 3.8 MMOL/L (ref 3.5–5.1)
RBC # BLD: 5.01 M/CU MM (ref 4.6–6.2)
SEGMENTED NEUTROPHILS ABSOLUTE COUNT: 3.4 K/CU MM
SEGMENTED NEUTROPHILS RELATIVE PERCENT: 56.2 % (ref 36–66)
SODIUM BLD-SCNC: 136 MMOL/L (ref 135–145)
TOTAL IMMATURE NEUTOROPHIL: 0.03 K/CU MM
TOTAL NUCLEATED RBC: 0 K/CU MM
TOTAL PROTEIN: 7.2 GM/DL (ref 6.4–8.2)
TROPONIN T: <0.01 NG/ML
WBC # BLD: 6.1 K/CU MM (ref 4–10.5)

## 2022-11-13 PROCEDURE — 72125 CT NECK SPINE W/O DYE: CPT

## 2022-11-13 PROCEDURE — 93005 ELECTROCARDIOGRAM TRACING: CPT | Performed by: EMERGENCY MEDICINE

## 2022-11-13 PROCEDURE — 96372 THER/PROPH/DIAG INJ SC/IM: CPT

## 2022-11-13 PROCEDURE — 2580000003 HC RX 258: Performed by: EMERGENCY MEDICINE

## 2022-11-13 PROCEDURE — 71250 CT THORAX DX C-: CPT

## 2022-11-13 PROCEDURE — 96375 TX/PRO/DX INJ NEW DRUG ADDON: CPT

## 2022-11-13 PROCEDURE — 96374 THER/PROPH/DIAG INJ IV PUSH: CPT

## 2022-11-13 PROCEDURE — 85025 COMPLETE CBC W/AUTO DIFF WBC: CPT

## 2022-11-13 PROCEDURE — 99285 EMERGENCY DEPT VISIT HI MDM: CPT

## 2022-11-13 PROCEDURE — 70450 CT HEAD/BRAIN W/O DYE: CPT

## 2022-11-13 PROCEDURE — 82962 GLUCOSE BLOOD TEST: CPT

## 2022-11-13 PROCEDURE — 96361 HYDRATE IV INFUSION ADD-ON: CPT

## 2022-11-13 PROCEDURE — 6370000000 HC RX 637 (ALT 250 FOR IP): Performed by: EMERGENCY MEDICINE

## 2022-11-13 PROCEDURE — 80053 COMPREHEN METABOLIC PANEL: CPT

## 2022-11-13 PROCEDURE — 2500000003 HC RX 250 WO HCPCS: Performed by: EMERGENCY MEDICINE

## 2022-11-13 PROCEDURE — 84484 ASSAY OF TROPONIN QUANT: CPT

## 2022-11-13 PROCEDURE — 6360000002 HC RX W HCPCS: Performed by: EMERGENCY MEDICINE

## 2022-11-13 RX ORDER — PREDNISONE 20 MG/1
40 TABLET ORAL 2 TIMES DAILY
Qty: 16 TABLET | Refills: 0 | Status: SHIPPED | OUTPATIENT
Start: 2022-11-14 | End: 2022-11-18

## 2022-11-13 RX ORDER — METHYLPREDNISOLONE SODIUM SUCCINATE 125 MG/2ML
125 INJECTION, POWDER, LYOPHILIZED, FOR SOLUTION INTRAMUSCULAR; INTRAVENOUS ONCE
Status: COMPLETED | OUTPATIENT
Start: 2022-11-13 | End: 2022-11-13

## 2022-11-13 RX ORDER — FAMOTIDINE 20 MG/1
20 TABLET, FILM COATED ORAL 2 TIMES DAILY
Qty: 8 TABLET | Refills: 0 | Status: SHIPPED | OUTPATIENT
Start: 2022-11-13 | End: 2022-11-17

## 2022-11-13 RX ORDER — DIPHENHYDRAMINE HCL 25 MG
50 TABLET ORAL EVERY 8 HOURS PRN
Qty: 25 TABLET | Refills: 0 | Status: SHIPPED | OUTPATIENT
Start: 2022-11-14 | End: 2022-11-18

## 2022-11-13 RX ORDER — EPINEPHRINE 0.3 MG/.3ML
0.3 INJECTION SUBCUTANEOUS ONCE
Qty: 0.3 ML | Refills: 0 | Status: SHIPPED | OUTPATIENT
Start: 2022-11-13 | End: 2022-11-13

## 2022-11-13 RX ORDER — FAMOTIDINE 10 MG/ML
40 INJECTION, SOLUTION INTRAVENOUS ONCE
Status: COMPLETED | OUTPATIENT
Start: 2022-11-13 | End: 2022-11-13

## 2022-11-13 RX ORDER — DIPHENHYDRAMINE HCL 25 MG
50 TABLET ORAL ONCE
Status: COMPLETED | OUTPATIENT
Start: 2022-11-13 | End: 2022-11-13

## 2022-11-13 RX ORDER — 0.9 % SODIUM CHLORIDE 0.9 %
1000 INTRAVENOUS SOLUTION INTRAVENOUS ONCE
Status: COMPLETED | OUTPATIENT
Start: 2022-11-13 | End: 2022-11-13

## 2022-11-13 RX ORDER — EPINEPHRINE 1 MG/ML
0.3 INJECTION, SOLUTION, CONCENTRATE INTRAVENOUS ONCE
Status: COMPLETED | OUTPATIENT
Start: 2022-11-13 | End: 2022-11-13

## 2022-11-13 RX ADMIN — METHYLPREDNISOLONE SODIUM SUCCINATE 125 MG: 125 INJECTION, POWDER, FOR SOLUTION INTRAMUSCULAR; INTRAVENOUS at 21:18

## 2022-11-13 RX ADMIN — SODIUM CHLORIDE 1000 ML: 9 INJECTION, SOLUTION INTRAVENOUS at 21:57

## 2022-11-13 RX ADMIN — DIPHENHYDRAMINE HYDROCHLORIDE 50 MG: 25 TABLET ORAL at 21:12

## 2022-11-13 RX ADMIN — EPINEPHRINE 0.3 MG: 1 INJECTION, SOLUTION, CONCENTRATE INTRAVENOUS at 21:19

## 2022-11-13 RX ADMIN — FAMOTIDINE 40 MG: 10 INJECTION, SOLUTION INTRAVENOUS at 21:18

## 2022-11-13 ASSESSMENT — PAIN - FUNCTIONAL ASSESSMENT: PAIN_FUNCTIONAL_ASSESSMENT: NONE - DENIES PAIN

## 2022-11-14 VITALS
TEMPERATURE: 97.8 F | BODY MASS INDEX: 28.05 KG/M2 | DIASTOLIC BLOOD PRESSURE: 60 MMHG | OXYGEN SATURATION: 96 % | HEIGHT: 69 IN | SYSTOLIC BLOOD PRESSURE: 129 MMHG | WEIGHT: 189.38 LBS | RESPIRATION RATE: 16 BRPM | HEART RATE: 72 BPM

## 2022-11-14 PROBLEM — T78.00XA ANAPHYLAXIS DUE TO FOOD: Status: ACTIVE | Noted: 2022-11-14

## 2022-11-14 LAB
EKG ATRIAL RATE: 71 BPM
EKG ATRIAL RATE: 80 BPM
EKG DIAGNOSIS: NORMAL
EKG DIAGNOSIS: NORMAL
EKG P AXIS: 30 DEGREES
EKG P AXIS: 51 DEGREES
EKG P-R INTERVAL: 184 MS
EKG P-R INTERVAL: 192 MS
EKG Q-T INTERVAL: 390 MS
EKG Q-T INTERVAL: 424 MS
EKG QRS DURATION: 92 MS
EKG QRS DURATION: 96 MS
EKG QTC CALCULATION (BAZETT): 449 MS
EKG QTC CALCULATION (BAZETT): 460 MS
EKG R AXIS: -29 DEGREES
EKG R AXIS: -33 DEGREES
EKG T AXIS: 19 DEGREES
EKG T AXIS: 48 DEGREES
EKG VENTRICULAR RATE: 71 BPM
EKG VENTRICULAR RATE: 80 BPM
ESTIMATED AVERAGE GLUCOSE: 154 MG/DL
GLUCOSE BLD-MCNC: 216 MG/DL (ref 70–99)
GLUCOSE BLD-MCNC: 273 MG/DL (ref 70–99)
GLUCOSE BLD-MCNC: 321 MG/DL (ref 70–99)
GLUCOSE BLD-MCNC: 363 MG/DL (ref 70–99)
HBA1C MFR BLD: 7 % (ref 4.2–6.3)
LV EF: 53 %
LVEF MODALITY: NORMAL
TROPONIN T: 0.02 NG/ML
TROPONIN T: <0.01 NG/ML
TROPONIN T: <0.01 NG/ML

## 2022-11-14 PROCEDURE — 2580000003 HC RX 258: Performed by: STUDENT IN AN ORGANIZED HEALTH CARE EDUCATION/TRAINING PROGRAM

## 2022-11-14 PROCEDURE — 93005 ELECTROCARDIOGRAM TRACING: CPT | Performed by: EMERGENCY MEDICINE

## 2022-11-14 PROCEDURE — 93306 TTE W/DOPPLER COMPLETE: CPT

## 2022-11-14 PROCEDURE — G0378 HOSPITAL OBSERVATION PER HR: HCPCS

## 2022-11-14 PROCEDURE — 96372 THER/PROPH/DIAG INJ SC/IM: CPT

## 2022-11-14 PROCEDURE — 93010 ELECTROCARDIOGRAM REPORT: CPT | Performed by: INTERNAL MEDICINE

## 2022-11-14 PROCEDURE — 99223 1ST HOSP IP/OBS HIGH 75: CPT | Performed by: INTERNAL MEDICINE

## 2022-11-14 PROCEDURE — 36415 COLL VENOUS BLD VENIPUNCTURE: CPT

## 2022-11-14 PROCEDURE — 6370000000 HC RX 637 (ALT 250 FOR IP): Performed by: EMERGENCY MEDICINE

## 2022-11-14 PROCEDURE — 6370000000 HC RX 637 (ALT 250 FOR IP): Performed by: STUDENT IN AN ORGANIZED HEALTH CARE EDUCATION/TRAINING PROGRAM

## 2022-11-14 PROCEDURE — 94761 N-INVAS EAR/PLS OXIMETRY MLT: CPT

## 2022-11-14 PROCEDURE — 96361 HYDRATE IV INFUSION ADD-ON: CPT

## 2022-11-14 PROCEDURE — 83036 HEMOGLOBIN GLYCOSYLATED A1C: CPT

## 2022-11-14 PROCEDURE — 2580000003 HC RX 258: Performed by: NURSE PRACTITIONER

## 2022-11-14 PROCEDURE — 6360000002 HC RX W HCPCS: Performed by: STUDENT IN AN ORGANIZED HEALTH CARE EDUCATION/TRAINING PROGRAM

## 2022-11-14 PROCEDURE — 82962 GLUCOSE BLOOD TEST: CPT

## 2022-11-14 PROCEDURE — 84484 ASSAY OF TROPONIN QUANT: CPT

## 2022-11-14 RX ORDER — ONDANSETRON 4 MG/1
4 TABLET, ORALLY DISINTEGRATING ORAL EVERY 8 HOURS PRN
Status: DISCONTINUED | OUTPATIENT
Start: 2022-11-14 | End: 2022-11-14 | Stop reason: HOSPADM

## 2022-11-14 RX ORDER — ASPIRIN 81 MG/1
324 TABLET, CHEWABLE ORAL ONCE
Status: COMPLETED | OUTPATIENT
Start: 2022-11-14 | End: 2022-11-14

## 2022-11-14 RX ORDER — INSULIN LISPRO 100 [IU]/ML
0-4 INJECTION, SOLUTION INTRAVENOUS; SUBCUTANEOUS NIGHTLY
Status: DISCONTINUED | OUTPATIENT
Start: 2022-11-14 | End: 2022-11-14 | Stop reason: HOSPADM

## 2022-11-14 RX ORDER — PRAVASTATIN SODIUM 40 MG
40 TABLET ORAL NIGHTLY
Status: DISCONTINUED | OUTPATIENT
Start: 2022-11-14 | End: 2022-11-14 | Stop reason: HOSPADM

## 2022-11-14 RX ORDER — ACETAMINOPHEN 325 MG/1
650 TABLET ORAL EVERY 6 HOURS PRN
Status: DISCONTINUED | OUTPATIENT
Start: 2022-11-14 | End: 2022-11-14 | Stop reason: HOSPADM

## 2022-11-14 RX ORDER — DEXTROSE MONOHYDRATE 100 MG/ML
INJECTION, SOLUTION INTRAVENOUS CONTINUOUS PRN
Status: DISCONTINUED | OUTPATIENT
Start: 2022-11-14 | End: 2022-11-14 | Stop reason: HOSPADM

## 2022-11-14 RX ORDER — SODIUM CHLORIDE 9 MG/ML
INJECTION, SOLUTION INTRAVENOUS CONTINUOUS
Status: DISCONTINUED | OUTPATIENT
Start: 2022-11-14 | End: 2022-11-14

## 2022-11-14 RX ORDER — SODIUM CHLORIDE 0.9 % (FLUSH) 0.9 %
5-40 SYRINGE (ML) INJECTION EVERY 12 HOURS SCHEDULED
Status: DISCONTINUED | OUTPATIENT
Start: 2022-11-14 | End: 2022-11-14 | Stop reason: HOSPADM

## 2022-11-14 RX ORDER — SODIUM CHLORIDE 0.9 % (FLUSH) 0.9 %
5-40 SYRINGE (ML) INJECTION PRN
Status: DISCONTINUED | OUTPATIENT
Start: 2022-11-14 | End: 2022-11-14 | Stop reason: HOSPADM

## 2022-11-14 RX ORDER — INSULIN LISPRO 100 [IU]/ML
0-8 INJECTION, SOLUTION INTRAVENOUS; SUBCUTANEOUS
Status: DISCONTINUED | OUTPATIENT
Start: 2022-11-14 | End: 2022-11-14 | Stop reason: HOSPADM

## 2022-11-14 RX ORDER — FINASTERIDE 5 MG/1
5 TABLET, FILM COATED ORAL DAILY
Status: DISCONTINUED | OUTPATIENT
Start: 2022-11-14 | End: 2022-11-14 | Stop reason: HOSPADM

## 2022-11-14 RX ORDER — ACETAMINOPHEN 650 MG/1
650 SUPPOSITORY RECTAL EVERY 6 HOURS PRN
Status: DISCONTINUED | OUTPATIENT
Start: 2022-11-14 | End: 2022-11-14 | Stop reason: HOSPADM

## 2022-11-14 RX ORDER — ONDANSETRON 2 MG/ML
4 INJECTION INTRAMUSCULAR; INTRAVENOUS EVERY 6 HOURS PRN
Status: DISCONTINUED | OUTPATIENT
Start: 2022-11-14 | End: 2022-11-14 | Stop reason: HOSPADM

## 2022-11-14 RX ORDER — ENOXAPARIN SODIUM 100 MG/ML
40 INJECTION SUBCUTANEOUS DAILY
Status: DISCONTINUED | OUTPATIENT
Start: 2022-11-14 | End: 2022-11-14 | Stop reason: HOSPADM

## 2022-11-14 RX ORDER — POLYETHYLENE GLYCOL 3350 17 G/17G
17 POWDER, FOR SOLUTION ORAL DAILY PRN
Status: DISCONTINUED | OUTPATIENT
Start: 2022-11-14 | End: 2022-11-14 | Stop reason: HOSPADM

## 2022-11-14 RX ORDER — SODIUM CHLORIDE 9 MG/ML
INJECTION, SOLUTION INTRAVENOUS PRN
Status: DISCONTINUED | OUTPATIENT
Start: 2022-11-14 | End: 2022-11-14 | Stop reason: HOSPADM

## 2022-11-14 RX ORDER — SODIUM CHLORIDE 9 MG/ML
500 INJECTION, SOLUTION INTRAVENOUS ONCE
Status: COMPLETED | OUTPATIENT
Start: 2022-11-14 | End: 2022-11-14

## 2022-11-14 RX ADMIN — Medication 10 ML: at 08:17

## 2022-11-14 RX ADMIN — METOPROLOL TARTRATE 12.5 MG: 25 TABLET, FILM COATED ORAL at 08:16

## 2022-11-14 RX ADMIN — SODIUM CHLORIDE 500 ML: 9 INJECTION, SOLUTION INTRAVENOUS at 17:23

## 2022-11-14 RX ADMIN — INSULIN LISPRO 4 UNITS: 100 INJECTION, SOLUTION INTRAVENOUS; SUBCUTANEOUS at 08:23

## 2022-11-14 RX ADMIN — INSULIN LISPRO 2 UNITS: 100 INJECTION, SOLUTION INTRAVENOUS; SUBCUTANEOUS at 17:44

## 2022-11-14 RX ADMIN — ENOXAPARIN SODIUM 40 MG: 40 INJECTION SUBCUTANEOUS at 08:16

## 2022-11-14 RX ADMIN — INSULIN LISPRO 4 UNITS: 100 INJECTION, SOLUTION INTRAVENOUS; SUBCUTANEOUS at 12:42

## 2022-11-14 RX ADMIN — FINASTERIDE 5 MG: 5 TABLET, FILM COATED ORAL at 08:16

## 2022-11-14 RX ADMIN — ASPIRIN 81 MG CHEWABLE TABLET 324 MG: 81 TABLET CHEWABLE at 04:51

## 2022-11-14 NOTE — ED PROVIDER NOTES
11:30 PM  Care of patient transferred from Dr. Pelon Brown to me. Patient is a 53-year-old male who presents after syncopal episode. Patient ate some coated green beans for dinner, and suddenly had nausea, and developed a diffuse, itchy rash. And had a syncopal episode. Here in the ED patient admitted to a sore throat, nausea, rash. He does have a history of similar symptoms with a bee sting. Previous physician did give IV Solu-Medrol, oral Benadryl, IV Pepcid and IM epinephrine for anaphylaxis, with IV fluid bolus. These did improve patient's symptoms. Plan to repeat troponin, and EKG at 12:15 AM, and observe patient for total of 3 hours status post IM epinephrine administration. If patient with continued improve symptoms, will disposition patient appropriately. Previous physician has written patient prescriptions for home for prednisone, Pepcid, Benadryl and EpiPen. EKG at 2102-normal sinus rhythm, rate 71, , QRS 96, QTc 460. EKG at 0024-normal sinus rhythm, rate 80, , QRS 92, QTc 449, no acute ST elevation, similar to previous EKG done earlier tonight. MDM:  Labs reviewed. No leukocytosis, hemoglobin and platelets stable. Electrolytes, BUN, creatinine ALT and AST within acceptable limits. Initial troponin was not detected, repeat troponin 3 hours later was 0.019. Repeat EKG did not yield any changes. Imaging reviewed. CT head with no acute intracranial abnormality. CT cervical spine with no acute osseous abnormality of the cervical spine. CT chest without contrast, no acute traumatic injury within the chest.  Severe esophageal reflux. Nodularity of the contour of the liver, concerning for cirrhosis. Patient with repeat troponin that is elevated, will admit for further evaluation and management. Aspirin is given. Labs and imaging discussed with patient, and he is agreeable with plan of care. Case discussed with hospitalist, Dr. Crystal Noe, who agrees with admission.        Chely MORENO [address] [facility]  11/14/22 0157

## 2022-11-14 NOTE — H&P
V2.0  History and Physical      Name:  Mary Walter /Age/Sex: 1945  (78 y.o. male)   MRN & CSN:  [id number] & [id number] Encounter Date/Time: 2022 3:45 AM EST   Location:  [facility] PCP: Aixa Amaral MD       Hospital Day: 2    Assessment and Plan:   Mary Walter is a 78 y.o. 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 ,  Less likely cardiogenic  -Order 2D echo, orthostatic vitals, telemetry monitoring  -EKG showed sinus rhythm with no acute ST-T wave changes. Elevated troponin  -No chest pain, no EKG changes  -Initial troponin negative followed by very mild elevation in troponin can be type II MI demand ischemia  -Will trend troponins if uptrending can consider cardiology consult. Anaphylaxis  -Started about 15 minutes after dinner, bread coated [employer] beans is something what patient thinks caused anaphylactic reaction  -Improved with epinephrine, Benadryl and Pepcid that patient received in ED.     CAD s/p CABG  -Continue metoprolol and aspirin    Hyperlipidemia  -Continue statin    Disposition:   Current Living situation: Home  Expected Disposition: Home  Estimated D/C: 1 day    Diet No diet orders on file   DVT Prophylaxis [x] Lovenox, []  Heparin, [] SCDs, [] Ambulation,  [] Eliquis, [] Xarelto   Code Status Prior   Surrogate Decision Maker/ POA Wife     History from:     patient and wife    History of Present Illness:     Chief Complaint: Anaphylaxis due to food  Mary Walter is a 78 y.o. male with pmh of  hypertension, CAD s/p CABG in September 2021, type 2 diabetes, hyperlipidemia who presents with anaphylaxis. Patient ate homemade pot pie.,  Salad bread coated [employer] beans for dinner, [employer] beings was something that he has not had before after about 15 minutes he started sneezing repeatedly which was followed by itching in his arms which was followed by nausea. At this time he got up from the chair and went in and drank some water. After drinking water while patient was going back to the chair passed out, fell on the floor, did not hit his head. Wife noticed this event, he was not having any seizures no urinary or bowel incontinence. He did have some numbness in his lips and some tongue swelling but did not bite his tongue. He was out for about 3 minutes but after he woke up he knew where he was still had some nausea wife called the EMS he was having some rash on his abdomen as well as bilateral arms on arrival to the ER. Received epinephrine, Benadryl, Solu-Medrol and Pepcid which improved the nausea, rash, itching as well as the sneezing. His initial troponin was negative but repeat troponin was slightly higher than normal so decision was made to admit for observation overnight. Review of Systems: Need 10 Elements   Review of Systems 10 point ROS negative except as noted above. Objective: Intake/Output Summary (Last 24 hours) at 11/14/2022 0345  Last data filed at 11/13/2022 2258  Gross per 24 hour   Intake 999.99 ml   Output --   Net 999.99 ml      Vitals:   Vitals:    11/13/22 2330 11/14/22 0000 11/14/22 0030 11/14/22 0200   BP: (!) 143/69 (!) 140/72 137/71 (!) 161/77   Pulse: 82 81 82 76   Resp:    10   Temp:    98.7 °F (37.1 °C)   TempSrc:       SpO2: 98% 96% 98% 98%   Weight:       Height:           Medications Prior to Admission     Prior to Admission medications    Medication Sig Start Date End Date Taking?  Authorizing Provider   predniSONE (DELTASONE) 20 MG tablet Take 2 tablets by mouth 2 times daily for 4 days Tympanic membrane normal.      Left Ear: Tympanic membrane normal.      Nose: Nose normal.      Mouth/Throat:      Mouth: Mucous membranes are moist.      Pharynx: Oropharynx is clear. Eyes:      Pupils: Pupils are equal, round, and reactive to light. Cardiovascular:      Rate and Rhythm: Normal rate and regular rhythm. Pulses: Normal pulses. Heart sounds: Normal heart sounds. No murmur heard. No friction rub. No gallop. Pulmonary:      Effort: Pulmonary effort is normal. No respiratory distress. Breath sounds: Normal breath sounds. No wheezing or rales. Abdominal:      General: Abdomen is flat. Bowel sounds are normal. There is no distension. Palpations: Abdomen is soft. Tenderness: There is no abdominal tenderness. There is no guarding. Musculoskeletal:         General: No swelling, tenderness or deformity. Normal range of motion. Cervical back: Normal range of motion and neck supple. Right lower leg: No edema. Left lower leg: No edema. Skin:     General: Skin is warm. Coloration: Skin is not jaundiced or pale. Findings: No bruising, erythema, lesion or rash. Neurological:      General: No focal deficit present. Mental Status: He is alert and oriented to person, place, and time. Mental status is at baseline. Cranial Nerves: No cranial nerve deficit. Sensory: No sensory deficit. Motor: No weakness. Gait: Gait normal.   Psychiatric:         Mood and Affect: Mood normal.          Past History:   PMHx   Past Medical History:   Diagnosis Date    Abnormal Heart Score CT 03/04/2005    Arthritis     CAD (coronary artery disease)     Cancer ([facility] [address] 2019 and 2020    Bladder    Diabetes mellitus ([facility] [address]     Elevated d-dimer 01/16/2020    Fatigue 06/18/2018    H/O cardiac catheterization 09/08/2021    STENOSIS TO : LAD, OM, CIRC, PDA & RCA, CABG evaluation. H/O echocardiogram 01/27/2020    EF 66-52, Grade I diastolic dysfunction. History of exercise stress test 01/27/2020    Treadmill, normal stress test, THR achieved    History of nuclear stress test 08/23/2021    EF 55%, ETT negative for ischemia/arrhythmia, Medium sized area of moderate inferior wall ischemia    Hyperlipidemia     Hypertension     Obesity 02/15/2000    Psoriasis 03/09/2004        PSHX:  has a past surgical history that includes fracture surgery (Right, 1969); fracture surgery (Left, 1994); fracture surgery (2011); Cholecystectomy (1999); hernia repair (1999); Breast cyst incision and drainage (Right, 2013); Colonoscopy (10/22/2003); Colonoscopy (2010); Colonoscopy (12/05/2017); Bladder surgery; and Coronary artery bypass graft (N/A, 9/10/2021). Fam HX: family history includes Diabetes in his brother, brother, brother, father, and paternal grandmother; Stroke in his mother. Soc HX:   Social History     Socioeconomic History    Marital status:    Tobacco Use    Smoking status: Former     Types: Cigars    Smokeless tobacco: Never    Tobacco comments:     also former cigarette smoker. 8/30/2021   Substance and Sexual Activity    Alcohol use: Not Currently    Drug use: No       Allergies: Allergies:    Allergies   Allergen Reactions    Clarithromycin     Levaquin [Levofloxacin] Hives    Bicillin [Penicillin G Benzathine] Rash       Medications:   Medications:    aspirin  324 mg Oral Once      Infusions:   PRN Meds:      Labs      CBC:   Recent Labs     11/13/22 2120   WBC 6.1   HGB 15.0        BMP:    Recent Labs     11/13/22 2058 11/13/22 2120   NA  --  136   K  --  3.8   CL  --  100   CO2  --  23   BUN  --  17   CREATININE  --  0.8*   GLUCOSE 162 213*     Hepatic:   Recent Labs     11/13/22 2120   AST 45*   ALT 40   BILITOT 0.3   ALKPHOS 71     Lipids:   Lab Results   Component Value Date/Time    CHOL 131 03/21/2022 10:32 AM    HDL 38 03/21/2022 10:32 AM    TRIG 127 03/21/2022 10:32 AM     Hemoglobin A1C:   Lab Results   Component Value Date/Time LABA1C 7.5 09/08/2021 11:45 AM     TSH: No results found for: TSH  Troponin:   Lab Results   Component Value Date/Time    TROPONINT 0.019 11/14/2022 12:15 AM    TROPONINT <0.010 11/13/2022 09:20 PM     Lactic Acid: No results for input(s): LACTA in the last 72 hours. BNP: No results for input(s): PROBNP in the last 72 hours. UA:  Lab Results   Component Value Date/Time    NITRU NEGATIVE 09/09/2021 10:20 AM    COLORU YELLOW 09/09/2021 10:20 AM    WBCUA 2 09/09/2021 10:20 AM    RBCUA <1 09/09/2021 10:20 AM    MUCUS RARE 09/09/2021 10:20 AM    TRICHOMONAS NONE SEEN 09/09/2021 10:20 AM    BACTERIA NEGATIVE 09/09/2021 10:20 AM    CLARITYU CLEAR 09/09/2021 10:20 AM    SPECGRAV 1.016 09/09/2021 10:20 AM    LEUKOCYTESUR NEGATIVE 09/09/2021 10:20 AM    UROBILINOGEN NEGATIVE 09/09/2021 10:20 AM    BILIRUBINUR NEGATIVE 09/09/2021 10:20 AM    BLOODU NEGATIVE 09/09/2021 10:20 AM    KETUA NEGATIVE 09/09/2021 10:20 AM     Urine Cultures: No results found for: Jean Zapata  Blood Cultures: No results found for: BC  No results found for: BLOODCULT2  Organism: No results found for: ORG    Imaging/Diagnostics Last 24 Hours   CT HEAD WO CONTRAST    Result Date: 11/13/2022  EXAMINATION: CT OF THE HEAD WITHOUT CONTRAST  11/13/2022 9:40 pm TECHNIQUE: CT of the head was performed without the administration of intravenous contrast. Automated exposure control, iterative reconstruction, and/or weight based adjustment of the mA/kV was utilized to reduce the radiation dose to as low as reasonably achievable. COMPARISON: None. HISTORY: ORDERING SYSTEM PROVIDED HISTORY: fall, closed head injury, + LOC, + AC TECHNOLOGIST PROVIDED HISTORY: Reason for exam:->fall, closed head injury, + LOC, + AC Has a \"code stroke\" or \"stroke alert\" been called? ->No Decision Support Exception - unselect if not a suspected or confirmed emergency medical condition->Emergency Medical Condition (MA) Reason for Exam: fall, closed head injury, + LOC, + AC FINDINGS: BRAIN/VENTRICLES: There is no acute intracranial hemorrhage, mass effect or midline shift. No abnormal extra-axial fluid collection. The gray-white differentiation is maintained without evidence of an acute infarct. There is no evidence of hydrocephalus. ORBITS: The visualized portion of the orbits demonstrate no acute abnormality. SINUSES: The visualized paranasal sinuses and mastoid air cells demonstrate no acute abnormality. SOFT TISSUES/SKULL:  No acute abnormality of the visualized skull or soft tissues. No acute intracranial abnormality. CT CHEST WO CONTRAST    Result Date: 11/13/2022  EXAMINATION: CT OF THE CHEST WITHOUT CONTRAST 11/13/2022 9:41 pm TECHNIQUE: CT of the chest was performed without the administration of intravenous contrast. Multiplanar reformatted images are provided for review. Automated exposure control, iterative reconstruction, and/or weight based adjustment of the mA/kV was utilized to reduce the radiation dose to as low as reasonably achievable. COMPARISON: CT chest dated September 8, 2021 HISTORY: ORDERING SYSTEM PROVIDED HISTORY: fall, L sided chest injury, pain TECHNOLOGIST PROVIDED HISTORY: Reason for exam:->fall, L sided chest injury, pain Decision Support Exception - unselect if not a suspected or confirmed emergency medical condition->Emergency Medical Condition (MA) Reason for Exam: fall, L sided chest injury, pain FINDINGS: Mediastinum: Severe esophageal reflux is noted. There is no pericardial effusion. Post CABG changes are present. Calcified mediastinal lymph nodes are seen from prior granulomatous disease. There is no evidence of mediastinal lymphadenopathy. Lungs/pleura: A cluster of pulmonary nodules are noted within the right upper lobe with the largest measuring 4 mm. These have not significantly changed since the prior examination. No follow-up is indicated due to size of these nodules. There is no hemothorax or pneumothorax.   There is no pulmonary contusion or laceration. Upper Abdomen: There is nodularity of the contour of the liver, concerning for cirrhosis. Calcified granulomas are seen within the spleen. Soft Tissues/Bones: There is no acute fracture or chest wall hematoma. 1. No acute traumatic injury within the chest. 2. Severe esophageal reflux. 3. Nodularity of the contour of the liver, concerning for cirrhosis. CT CERVICAL SPINE WO CONTRAST    Result Date: 11/13/2022  EXAMINATION: CT OF THE CERVICAL SPINE WITHOUT CONTRAST 11/13/2022 9:41 pm TECHNIQUE: CT of the cervical spine was performed without the administration of intravenous contrast. Multiplanar reformatted images are provided for review. Automated exposure control, iterative reconstruction, and/or weight based adjustment of the mA/kV was utilized to reduce the radiation dose to as low as reasonably achievable. COMPARISON: 10/10/2011 HISTORY: ORDERING SYSTEM PROVIDED HISTORY: fall, closed head injury TECHNOLOGIST PROVIDED HISTORY: Reason for exam:->fall, closed head injury Decision Support Exception - unselect if not a suspected or confirmed emergency medical condition->Emergency Medical Condition (MA) Reason for Exam: fall, closed head injury FINDINGS: BONES/ALIGNMENT: There is no acute fracture or traumatic malalignment. DEGENERATIVE CHANGES: Mild multilevel degenerative disease and facet arthropathy. Prominent anterior osteophytes from C4-5 through C6-7. SOFT TISSUES: No acute paraspinal soft tissue abnormality. No acute osseous abnormality of the cervical spine.        Electronically signed by Sanjuanita Henry MD on 11/14/2022 at 3:45 AM

## 2022-11-14 NOTE — PLAN OF CARE
Problem: Discharge Planning  Goal: Discharge to home or other facility with appropriate resources  11/14/2022 1833 by Sandeep Nicholson RN  Outcome: Completed  11/14/2022 0835 by Sandeep Nicholson RN  Outcome: Progressing  11/14/2022 0559 by Jailyn Salvador RN  Outcome: Progressing  Flowsheets (Taken 11/14/2022 0435)  Discharge to home or other facility with appropriate resources: Identify barriers to discharge with patient and caregiver     Problem: Safety - Adult  Goal: Free from fall injury  11/14/2022 1833 by Sandeep Nicholson RN  Outcome: Completed  11/14/2022 0835 by Sandeep Nicholson RN  Outcome: Progressing  11/14/2022 0559 by Jailyn Salvador RN  Outcome: Progressing

## 2022-11-14 NOTE — ED NOTES
[phone] called to update ED that they are unable to take patient at this time due to nurse being at lunch and charge nurse taking full team. House supervisor called and advised to wait for nurse to come back from lunch.      Lupe Coppola  11/14/22 [id number]

## 2022-11-14 NOTE — DISCHARGE SUMMARY
V2.0  Discharge Summary    Name:  Denise Ku /Age/Sex:  (78 y.o. male)   Admit Date: 2022  Discharge Date: 22    MRN & CSN:  [id number] & [id number] Encounter Date and Time 22 4:00 PM EST    Attending:  Joyce Carnes MD Discharging Provider: Nick Foster [address] Course:     Brief HPI: Denise Ku is a 78 y.o. male with history of hypertension, type 2 diabetes, CAD s/p CABG in 2021 who was admitted to observation for syncope and elevated troponin. Pt ate something in dinner last evening. He started sneezing, and itchiness in back and groin area 15 minutes later. Then he turned around in kitchen and suddenly passed out. Pt was told that he was out for about 3 minutes. He could hear her wife was talking to EMS when he was passed out. Pt was found unremarkable EKG. His first troponin was negative. But his second troponin was slightly elevated at 0.027. His third and fourth troponin are all negative. Pt was treated with epinephrine and solumedrol in ER. Pt denied shortness of breath. No chest pain this morning. Cardiology consulted. Considered likely due to orthostatic hypotension. Recommended compression socks and 30 days monitor outpatient. Brief Problem Based Course:   Syncope  -Likely secondary to anaphylaxis, with initial history of sneezing followed by itching followed by tingling in the lips and nausea ordered by syncope, no confusion after the event, passed out for around 3 minutes. ,  Less likely cardiogenic  -Order 2D echo, orthostatic vitals, telemetry monitoring   -EKG showed sinus rhythm with no acute ST-T wave changes.     Orthostatic hypotension  -history of orthostatic hypotension  -was on Midodrine  -positive orthostatic vitals  -received iv fluids  -pt will be discharged home with compression socks     Elevated troponin  -No chest pain, no EKG changes  -Initial troponin negative followed by very mild elevation in troponin can be type II MI demand ischemia  -third and fourth troponin are negative  -appreciate cardiology input: pt will have echocardiogram, recommended outpatient 30-days monitor     Anaphylaxis  -Started about 15 minutes after dinner, bread coated [employer] beans is something what patient thinks caused anaphylactic reaction  -Improved with epinephrine, Benadryl and Pepcid that patient received in ED. CAD s/p CABG  -Continue metoprolol and aspirin     Hyperlipidemia  -Continue statin      The patient expressed appropriate understanding of, and agreement with the discharge recommendations, medications, and plan.      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, []Hospice   Condition on discharge: Stable  Labs and Tests to be Followed up as an outpatient by PCP or Specialist:     Discharge Medications:        Medication List        START taking these medications      diphenhydrAMINE 25 MG tablet  Commonly known as: Benadryl Allergy  Take 2 tablets by mouth every 8 hours as needed for Itching     EPINEPHrine 0.3 MG/0.3ML Soaj injection  Commonly known as: EpiPen 2-Shaq  Inject 0.3 mLs into the muscle once for 1 dose Use as directed for severe allergic reaction     famotidine 20 MG tablet  Commonly known as: Pepcid  Take 1 tablet by mouth 2 times daily for 4 days     predniSONE 20 MG tablet  Commonly known as: DELTASONE  Take 2 tablets by mouth 2 times daily for 4 days            CONTINUE taking these medications      * OCUVITE ADULT 50+ PO     * CENTRUM SILVER 50+MEN PO     CO Q 10 PO     finasteride 5 MG tablet  Commonly known as: PROSCAR glimepiride 4 MG tablet  Commonly known as: AMARYL     Januvia 100 MG tablet  Generic drug: SITagliptin     latanoprost 0.005 % ophthalmic solution  Commonly known as: XALATAN     metoprolol tartrate 25 MG tablet  Commonly known as: LOPRESSOR  Take 0.5 tablets by mouth 2 times daily     pravastatin 40 MG tablet  Commonly known as: PRAVACHOL     tadalafil 5 MG tablet  Commonly known as: CIALIS           * This list has 2 medication(s) that are the same as other medications prescribed for you. Read the directions carefully, and ask your doctor or other care provider to review them with you. STOP taking these medications      aspirin 81 MG tablet            ASK your doctor about these medications      metFORMIN 1000 MG tablet  Commonly known as: GLUCOPHAGE  Take 1 tablet by mouth 2 times daily (with meals)               Where to Get Your Medications        These medications were sent to [facility] , [place] - [address] [place] [address]. - P [phone] - F [phone] [address] [facility] [address]      Phone: [phone]   diphenhydrAMINE 25 MG tablet  EPINEPHrine 0.3 MG/0.3ML Soaj injection  famotidine 20 MG tablet  predniSONE 20 MG tablet        Objective Findings at Discharge:   BP (!) 144/70   Pulse 83   Temp (!) 95.8 °F (35.4 °C) (Axillary)   Resp 14   Ht 5' 9\" (1.753 m)   Wt 189 lb 6 oz (85.9 kg)   SpO2 96%   BMI 27.97 kg/m²       Physical Exam:   General: NAD  Eyes: EOMI  ENT: neck supple  Cardiovascular: Regular rate. Respiratory: Clear to auscultation  Gastrointestinal: Soft, non tender  Genitourinary: no suprapubic tenderness  Musculoskeletal: No edema  Skin: warm, dry  Neuro: Alert. Psych: Mood appropriate.          Labs and Imaging   CT HEAD WO CONTRAST    Result Date: 11/13/2022  EXAMINATION: CT OF THE HEAD WITHOUT CONTRAST  11/13/2022 9:40 pm TECHNIQUE: CT of the head was performed without the administration of intravenous contrast. Automated exposure control, iterative reconstruction, and/or weight based adjustment of the mA/kV was utilized to reduce the radiation dose to as low as reasonably achievable. COMPARISON: None. HISTORY: ORDERING SYSTEM PROVIDED HISTORY: fall, closed head injury, + LOC, + AC TECHNOLOGIST PROVIDED HISTORY: Reason for exam:->fall, closed head injury, + LOC, + AC Has a \"code stroke\" or \"stroke alert\" been called? ->No Decision Support Exception - unselect if not a suspected or confirmed emergency medical condition->Emergency Medical Condition (MA) Reason for Exam: fall, closed head injury, + LOC, + AC FINDINGS: BRAIN/VENTRICLES: There is no acute intracranial hemorrhage, mass effect or midline shift. No abnormal extra-axial fluid collection. The gray-white differentiation is maintained without evidence of an acute infarct. There is no evidence of hydrocephalus. ORBITS: The visualized portion of the orbits demonstrate no acute abnormality. SINUSES: The visualized paranasal sinuses and mastoid air cells demonstrate no acute abnormality. SOFT TISSUES/SKULL:  No acute abnormality of the visualized skull or soft tissues. No acute intracranial abnormality. CT CHEST WO CONTRAST    Result Date: 11/14/2022  EXAMINATION: CT OF THE CHEST WITHOUT CONTRAST 11/13/2022 9:41 pm TECHNIQUE: CT of the chest was performed without the administration of intravenous contrast. Multiplanar reformatted images are provided for review. Automated exposure control, iterative reconstruction, and/or weight based adjustment of the mA/kV was utilized to reduce the radiation dose to as low as reasonably achievable.  COMPARISON: CT chest dated September 8, 2021 HISTORY: ORDERING SYSTEM PROVIDED HISTORY: fall, L sided chest injury, pain TECHNOLOGIST PROVIDED HISTORY: Reason for exam:->fall, L sided chest injury, pain Decision Support Exception - unselect if not a suspected or confirmed emergency medical condition->Emergency Medical Condition (MA) Reason for Exam: fall, L sided chest injury, pain FINDINGS: Mediastinum: Severe esophageal reflux is noted. There is no pericardial effusion. Post CABG changes are present. Calcified mediastinal lymph nodes are seen from prior granulomatous disease. There is no evidence of mediastinal lymphadenopathy. Lungs/pleura: A cluster of pulmonary nodules are noted within the right upper lobe with the largest measuring 4 mm. These have not significantly changed since the prior examination. No follow-up is indicated due to size of these nodules. There is no hemothorax or pneumothorax. There is no pulmonary contusion or laceration. Upper Abdomen: There is nodularity of the contour of the liver, concerning for cirrhosis. Calcified granulomas are seen within the spleen. Soft Tissues/Bones: There is no acute fracture or chest wall hematoma. 1. No acute traumatic injury within the chest. 2. Severe esophageal reflux. 3. Nodularity of the contour of the liver, concerning for cirrhosis. CT CERVICAL SPINE WO CONTRAST    Result Date: 11/13/2022  EXAMINATION: CT OF THE CERVICAL SPINE WITHOUT CONTRAST 11/13/2022 9:41 pm TECHNIQUE: CT of the cervical spine was performed without the administration of intravenous contrast. Multiplanar reformatted images are provided for review. Automated exposure control, iterative reconstruction, and/or weight based adjustment of the mA/kV was utilized to reduce the radiation dose to as low as reasonably achievable. COMPARISON: 10/10/2011 HISTORY: ORDERING SYSTEM PROVIDED HISTORY: fall, closed head injury TECHNOLOGIST PROVIDED HISTORY: Reason for exam:->fall, closed head injury Decision Support Exception - unselect if not a suspected or confirmed emergency medical condition->Emergency Medical Condition (MA) Reason for Exam: fall, closed head injury FINDINGS: BONES/ALIGNMENT: There is no acute fracture or traumatic malalignment. DEGENERATIVE CHANGES: Mild multilevel degenerative disease and facet arthropathy. Prominent anterior osteophytes from C4-5 through C6-7. SOFT TISSUES: No acute paraspinal soft tissue abnormality. No acute osseous abnormality of the cervical spine. CBC:   Recent Labs     11/13/22 2120   WBC 6.1   HGB 15.0        BMP:    Recent Labs     11/13/22 2058 11/13/22 2120   NA  --  136   K  --  3.8   CL  --  100   CO2  --  23   BUN  --  17   CREATININE  --  0.8*   GLUCOSE 162 213*     Hepatic:   Recent Labs     11/13/22 2120   AST 45*   ALT 40   BILITOT 0.3   ALKPHOS 71     Lipids:   Lab Results   Component Value Date/Time    CHOL 131 03/21/2022 10:32 AM    HDL 38 03/21/2022 10:32 AM    TRIG 127 03/21/2022 10:32 AM     Hemoglobin A1C:   Lab Results   Component Value Date/Time    LABA1C 7.0 11/14/2022 07:27 AM     TSH: No results found for: TSH  Troponin:   Lab Results   Component Value Date/Time    TROPONINT <0.010 11/14/2022 12:26 PM    TROPONINT <0.010 11/14/2022 07:27 AM    TROPONINT 0.019 11/14/2022 12:15 AM     Lactic Acid: No results for input(s): LACTA in the last 72 hours. BNP: No results for input(s): PROBNP in the last 72 hours.   UA:  Lab Results   Component Value Date/Time    NITRU NEGATIVE 09/09/2021 10:20 AM    COLORU YELLOW 09/09/2021 10:20 AM    WBCUA 2 09/09/2021 10:20 AM    RBCUA <1 09/09/2021 10:20 AM    MUCUS RARE 09/09/2021 10:20 AM    TRICHOMONAS NONE SEEN 09/09/2021 10:20 AM    BACTERIA NEGATIVE 09/09/2021 10:20 AM    CLARITYU CLEAR 09/09/2021 10:20 AM    SPECGRAV 1.016 09/09/2021 10:20 AM    LEUKOCYTESUR NEGATIVE 09/09/2021 10:20 AM    UROBILINOGEN NEGATIVE 09/09/2021 10:20 AM    BILIRUBINUR NEGATIVE 09/09/2021 10:20 AM    BLOODU NEGATIVE 09/09/2021 10:20 AM    KETUA NEGATIVE 09/09/2021 10:20 AM     Urine Cultures: No results found for: LABURIN  Blood Cultures: No results found for: BC  No results found for: BLOODCULT2  Organism: No results found for: ORG    Time Spent Discharging patient 35 minutes    Electronically signed by Ghassan Jackson CNP on 11/14/2022 at 5:07 PM

## 2022-11-14 NOTE — PLAN OF CARE
Problem: Discharge Planning  Goal: Discharge to home or other facility with appropriate resources  11/14/2022 0835 by Jia Davila RN  Outcome: Progressing  11/14/2022 0559 by Bj Graff RN  Outcome: Progressing  Flowsheets (Taken 11/14/2022 0435)  Discharge to home or other facility with appropriate resources: Identify barriers to discharge with patient and caregiver     Problem: Safety - Adult  Goal: Free from fall injury  11/14/2022 0835 by Jia Davila RN  Outcome: Progressing  11/14/2022 0559 by Bj Graff RN  Outcome: Progressing

## 2022-11-14 NOTE — ED NOTES
PT IS IMPROVING REDNESS ON UPPER BODY DISAPPEARING, SAT IMPROVED, PT STILL HAS CHILLS,  AT [facility], RN  11/13/22 [id number]

## 2022-11-14 NOTE — CONSULTS
CARDIOLOGY CONSULT NOTE   Reason for consultation:  Syncope     Referring physician:  Silvia Webb MD     Primary care physician: Ruddy Gibson MD      Dear  Dr. Silvia Webb MD   Thanks for the consult. Chief Complaints :  Chief Complaint   Patient presents with    Loss of Consciousness     WITNESSED BY FAMILY, HIT HEAD, RECENT CHANGES IN BP MEDS, DAILY ASA NO OTHER THINNERS    Rib Injury     LEFT         History of present illness:Ed is a 78 y. o.year old who presents after passing out at home he says he had something for dinner after which he started sneezing started noticing rash and itching in his back and his groin area as well as belly his mouth started tingling he tried to get some water next thing he knows he passed out according to wife he was stiff not really shaking he was not incontinent he says he is felt like this when his blood pressure was low in the past but never this traumatic no chest pain no palpitations had no prodromal symptoms he has not started any new medication. EKG and troponin are normal  Historically has had hypotension taking midodrine but not on it anymore as he did not needed he has history of coronary artery disease s/p CABG EKG EKG shows normal sinus rhythm serial troponins are normal  He is on a small dose of metoprolol from cardiac point of view no other medication diabetes generally well controlled    Past medical history:    has a past medical history of Abnormal Heart Score CT, Arthritis, CAD (coronary artery disease), Cancer ([facility] [address] Diabetes mellitus ([facility] [address] Elevated d-dimer, Fatigue, H/O cardiac catheterization, H/O echocardiogram, History of exercise stress test, History of nuclear stress test, Hyperlipidemia, Hypertension, Obesity, and Psoriasis. Past surgical history:   has a past surgical history that includes fracture surgery (Right, 1969); fracture surgery (Left, 1994); fracture surgery (2011); Cholecystectomy (1999); hernia repair (1999);  Breast cyst incision and drainage (Right, 2013); Colonoscopy (10/22/2003); Colonoscopy (2010); Colonoscopy (12/05/2017); Bladder surgery; and Coronary artery bypass graft (N/A, 9/10/2021). Social History:   reports that he has quit smoking. His smoking use included cigars. He has never used smokeless tobacco. He reports that he does not currently use alcohol. He reports that he does not use drugs.   Family history:   no family history of CAD, STROKE of DM at early age    Allergies   Allergen Reactions    Clarithromycin     Levaquin [Levofloxacin] Hives    Bicillin [Penicillin G Benzathine] Rash       finasteride (PROSCAR) tablet 5 mg, Daily  metoprolol tartrate (LOPRESSOR) tablet 12.5 mg, BID  pravastatin (PRAVACHOL) tablet 40 mg, Nightly  glucose chewable tablet 16 g, PRN  dextrose bolus 10% 125 mL, PRN   Or  dextrose bolus 10% 250 mL, PRN  glucagon (rDNA) injection 1 mg, PRN  dextrose 10 % infusion, Continuous PRN  insulin lispro (HUMALOG) injection vial 0-8 Units, TID WC  insulin lispro (HUMALOG) injection vial 0-4 Units, Nightly  sodium chloride flush 0.9 % injection 5-40 mL, 2 times per day  sodium chloride flush 0.9 % injection 5-40 mL, PRN  0.9 % sodium chloride infusion, PRN  enoxaparin (LOVENOX) injection 40 mg, Daily  ondansetron (ZOFRAN-ODT) disintegrating tablet 4 mg, Q8H PRN   Or  ondansetron (ZOFRAN) injection 4 mg, Q6H PRN  polyethylene glycol (GLYCOLAX) packet 17 g, Daily PRN  acetaminophen (TYLENOL) tablet 650 mg, Q6H PRN   Or  acetaminophen (TYLENOL) suppository 650 mg, Q6H PRN      Current Facility-Administered Medications   Medication Dose Route Frequency Provider Last Rate Last Admin    finasteride (PROSCAR) tablet 5 mg  5 mg Oral Daily Terryl Koyanagi, MD   5 mg at 11/14/22 0816    metoprolol tartrate (LOPRESSOR) tablet 12.5 mg  12.5 mg Oral BID Terryl Koyanagi, MD   12.5 mg at 11/14/22 0816    pravastatin (PRAVACHOL) tablet 40 mg  40 mg Oral Nightly Terryl Koyanagi, MD        glucose chewable tablet 16 g  4 tablet Oral PRN Love Vidal Mclaughlin MD        dextrose bolus 10% 125 mL  125 mL IntraVENous PRN Danni Crews MD        Or    dextrose bolus 10% 250 mL  250 mL IntraVENous PRN Danni Crews MD        glucagon (rDNA) injection 1 mg  1 mg SubCUTAneous PRN Danni Crews MD        dextrose 10 % infusion   IntraVENous Continuous PRN Danni Crews MD        insulin lispro (HUMALOG) injection vial 0-8 Units  0-8 Units SubCUTAneous TID WC Danni Crews MD   4 Units at 11/14/22 1242    insulin lispro (HUMALOG) injection vial 0-4 Units  0-4 Units SubCUTAneous Nightly Danni Crews MD   4 Units at 11/14/22 7773    sodium chloride flush 0.9 % injection 5-40 mL  5-40 mL IntraVENous 2 times per day Danni Crews MD   10 mL at 11/14/22 0817    sodium chloride flush 0.9 % injection 5-40 mL  5-40 mL IntraVENous PRN Danni Crews MD        0.9 % sodium chloride infusion   IntraVENous PRN Danni Crews MD        enoxaparin (LOVENOX) injection 40 mg  40 mg SubCUTAneous Daily Danin Crews MD   40 mg at 11/14/22 0816    ondansetron (ZOFRAN-ODT) disintegrating tablet 4 mg  4 mg Oral Q8H PRN Danni Crews MD        Or    ondansetron [facility]) injection 4 mg  4 mg IntraVENous Q6H PRN Danni Crews MD        polyethylene glycol (GLYCOLAX) packet 17 g  17 g Oral Daily PRN Danni Crews MD        acetaminophen (TYLENOL) tablet 650 mg  650 mg Oral Q6H PRN Danni Crews MD        Or    acetaminophen (TYLENOL) suppository 650 mg  650 mg Rectal Q6H PRN Danni Crews MD         Review of Systems:   Constitutional: No Fever or Weight Loss   Eyes: No Decreased Vision  ENT: No Headaches, Hearing Loss or Vertigo  Cardiovascular: As per HPI  Respiratory: As per HPI  Gastrointestinal: No abdominal pain, appetite loss, blood in stools, constipation, diarrhea or heartburn  Genitourinary: No dysuria, trouble voiding, or hematuria  Musculoskeletal:  No gait disturbance, weakness or joint complaints  Integumentary: No rash or pruritis  Neurological: No TIA or stroke symptoms  Psychiatric: No anxiety or depression  Endocrine: No malaise, fatigue or temperature intolerance  Hematologic/Lymphatic: No bleeding problems, blood clots or swollen lymph nodes  Allergic/Immunologic: No nasal congestion or hives  All systems negative except as marked. Physical Examination:    Vitals:    11/14/22 1100 11/14/22 1142 11/14/22 1429 11/14/22 1500   BP: 134/73  130/63 127/63   Pulse: 79  84 83   Resp: 17  14 14   Temp: 97.7 °F (36.5 °C)  97.8 °F (36.6 °C)    TempSrc: Oral  Oral    SpO2: 96% 96%     Weight:       Height:           General Appearance:  No distress, conversant    Constitutional:  Well developed, Well nourished, No acute distress, Non-toxic appearance. HENT:  Normocephalic, Atraumatic, Bilateral external ears normal, Oropharynx moist, No oral exudates, Nose normal. Neck- Normal range of motion, No tenderness, Supple, No stridor,no apical-carotid delay  Lymphatics : no palpable lymph nodes  Eyes:  PERRL, EOMI, Conjunctiva normal, No discharge. Respiratory:  Normal breath sounds, No respiratory distress, No wheezing, No chest tenderness. ,no use of accessory muscles, crackles Absent   Cardiovascular: (PMI) apex non displaced,no lifts no thrills, ankle swelling Absent  , 1+, s1 and s2 audible,Murmur. Absent , JVD not noted    Abdomen /GI:  Bowel sounds normal, Soft, No tenderness, No masses, No gross visceromegaly   :  No costovertebral angle tenderness   Musculoskeletal:  No edema, no tenderness, no deformities.  Back- no tenderness  Integument:  Well hydrated, no rash   Lymphatic:  No lymphadenopathy noted   Neurologic:  Alert & oriented x 3, CN 2-12 normal, normal motor function, normal sensory function, no focal deficits noted           Medical decision making and Data review:    Lab Review   Recent Labs     11/13/22 2120   WBC 6.1   HGB 15.0   HCT 45.1         Recent Labs     11/13/22 2120      K 3.8      CO2 23   BUN 17   CREATININE 0.8*     Recent Labs     11/13/22 2120   AST 45*   ALT 40   BILITOT 0.3 ALKPHOS 71     Recent Labs     11/14/22  0015 11/14/22  0727 11/14/22  1226   TROPONINT 0.019* <0.010 <0.010       No results for input(s): PROBNP in the last 72 hours. Lab Results   Component Value Date    INR 1.12 09/10/2021    PROTIME 14.5 09/10/2021       EKG: (reviewed by myself)    ECHO:(reviewed by myself)    Chest Xray:(reviewed by myself)  CT HEAD WO CONTRAST    Result Date: 11/13/2022  EXAMINATION: CT OF THE HEAD WITHOUT CONTRAST  11/13/2022 9:40 pm TECHNIQUE: CT of the head was performed without the administration of intravenous contrast. Automated exposure control, iterative reconstruction, and/or weight based adjustment of the mA/kV was utilized to reduce the radiation dose to as low as reasonably achievable. COMPARISON: None. HISTORY: ORDERING SYSTEM PROVIDED HISTORY: fall, closed head injury, + LOC, + AC TECHNOLOGIST PROVIDED HISTORY: Reason for exam:->fall, closed head injury, + LOC, + AC Has a \"code stroke\" or \"stroke alert\" been called? ->No Decision Support Exception - unselect if not a suspected or confirmed emergency medical condition->Emergency Medical Condition (MA) Reason for Exam: fall, closed head injury, + LOC, + AC FINDINGS: BRAIN/VENTRICLES: There is no acute intracranial hemorrhage, mass effect or midline shift. No abnormal extra-axial fluid collection. The gray-white differentiation is maintained without evidence of an acute infarct. There is no evidence of hydrocephalus. ORBITS: The visualized portion of the orbits demonstrate no acute abnormality. SINUSES: The visualized paranasal sinuses and mastoid air cells demonstrate no acute abnormality. SOFT TISSUES/SKULL:  No acute abnormality of the visualized skull or soft tissues. No acute intracranial abnormality.      CT CHEST WO CONTRAST    Result Date: 11/14/2022  EXAMINATION: CT OF THE CHEST WITHOUT CONTRAST 11/13/2022 9:41 pm TECHNIQUE: CT of the chest was performed without the administration of intravenous contrast. Multiplanar reformatted images are provided for review. Automated exposure control, iterative reconstruction, and/or weight based adjustment of the mA/kV was utilized to reduce the radiation dose to as low as reasonably achievable. COMPARISON: CT chest dated September 8, 2021 HISTORY: ORDERING SYSTEM PROVIDED HISTORY: fall, L sided chest injury, pain TECHNOLOGIST PROVIDED HISTORY: Reason for exam:->fall, L sided chest injury, pain Decision Support Exception - unselect if not a suspected or confirmed emergency medical condition->Emergency Medical Condition (MA) Reason for Exam: fall, L sided chest injury, pain FINDINGS: Mediastinum: Severe esophageal reflux is noted. There is no pericardial effusion. Post CABG changes are present. Calcified mediastinal lymph nodes are seen from prior granulomatous disease. There is no evidence of mediastinal lymphadenopathy. Lungs/pleura: A cluster of pulmonary nodules are noted within the right upper lobe with the largest measuring 4 mm. These have not significantly changed since the prior examination. No follow-up is indicated due to size of these nodules. There is no hemothorax or pneumothorax. There is no pulmonary contusion or laceration. Upper Abdomen: There is nodularity of the contour of the liver, concerning for cirrhosis. Calcified granulomas are seen within the spleen. Soft Tissues/Bones: There is no acute fracture or chest wall hematoma. 1. No acute traumatic injury within the chest. 2. Severe esophageal reflux. 3. Nodularity of the contour of the liver, concerning for cirrhosis. CT CERVICAL SPINE WO CONTRAST    Result Date: 11/13/2022  EXAMINATION: CT OF THE CERVICAL SPINE WITHOUT CONTRAST 11/13/2022 9:41 pm TECHNIQUE: CT of the cervical spine was performed without the administration of intravenous contrast. Multiplanar reformatted images are provided for review.  Automated exposure control, iterative reconstruction, and/or weight based adjustment of the mA/kV was utilized to reduce the radiation dose to as low as reasonably achievable. COMPARISON: 10/10/2011 HISTORY: ORDERING SYSTEM PROVIDED HISTORY: fall, closed head injury TECHNOLOGIST PROVIDED HISTORY: Reason for exam:->fall, closed head injury Decision Support Exception - unselect if not a suspected or confirmed emergency medical condition->Emergency Medical Condition (MA) Reason for Exam: fall, closed head injury FINDINGS: BONES/ALIGNMENT: There is no acute fracture or traumatic malalignment. DEGENERATIVE CHANGES: Mild multilevel degenerative disease and facet arthropathy. Prominent anterior osteophytes from C4-5 through C6-7. SOFT TISSUES: No acute paraspinal soft tissue abnormality. No acute osseous abnormality of the cervical spine. All labs, medications and tests reviewed by myself including data  from outside source , patient and available family . Continue all other medications of all above medical condition listed as is. 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 *      Assessment: 78 y. o.year old with PMH of  has a past medical history of Abnormal Heart Score CT, Arthritis, CAD (coronary artery disease), Cancer ([address] Diabetes mellitus ([address] Elevated d-dimer, Fatigue, H/O cardiac catheterization, H/O echocardiogram, History of exercise stress test, History of nuclear stress test, Hyperlipidemia, Hypertension, Obesity, and Psoriasis. Plan and Recommendations:    History of coronary artery disease CABG  Syncope /Dizziness : check orthostatics, compression stockings, will get echo, will get outpatient 30-day monitor  DVT prophylaxis if no contraindication  6. Dyslipidemia: continue statins           Thank you  much for consult and giving us the opportunity in contributing in the care of this patient. Please feel free to call me for any questions.        Stan Graff MD, 11/14/2022 3:22 PM

## 2022-11-14 NOTE — PROGRESS NOTES
DC complete. Patient and spouse verbalize understanding of DC instructions. Patient in NAD during DC instruct.  To main entrance via [address] with [facility]

## 2022-11-14 NOTE — PROGRESS NOTES
Bernie Phlebotomist notified no results for troponin due at 0500 noted. Bernie stated it would be drawn shortly.

## 2022-11-14 NOTE — ED NOTES
(*)     All other components within normal limits     Critical values: yes     Abnormal Assessment Findings: LEFT FOREHEAD ABRASION, LEFT RIB PAIN    Background  History:   Past Medical History:   Diagnosis Date    Abnormal Heart Score CT 03/04/2005    Arthritis     CAD (coronary artery disease)     Cancer ([facility] [address] 2019 and 2020    Bladder    Diabetes mellitus ([facility] [address]     Elevated d-dimer 01/16/2020    Fatigue 06/18/2018    H/O cardiac catheterization 09/08/2021    STENOSIS TO : LAD, OM, CIRC, PDA & RCA, CABG evaluation.  H/O echocardiogram 01/27/2020    EF 52-85, Grade I diastolic dysfunction.     History of exercise stress test 01/27/2020    Treadmill, normal stress test, THR achieved    History of nuclear stress test 08/23/2021    EF 55%, ETT negative for ischemia/arrhythmia, Medium sized area of moderate inferior wall ischemia    Hyperlipidemia     Hypertension     Obesity 02/15/2000    Psoriasis 03/09/2004       Assessment    Vitals/MEWS: MEWS Score: 1  Level of Consciousness: Alert (0)   Vitals:    11/13/22 2330 11/14/22 0000 11/14/22 0030 11/14/22 0200   BP: (!) 143/69 (!) 140/72 137/71 (!) 161/77   Pulse: 82 81 82 76   Resp:    10   Temp:    98.7 °F (37.1 °C)   TempSrc:       SpO2: 98% 96% 98% 98%   Weight:       Height:         FiO2 (%):   O2 Flow Rate: O2 Device: None (Room air)    Cardiac Rhythm:    Pain Assessment: 4 [x] Verbal [] Manuel Eagles Scale  Pain Scale: Pain Assessment  Pain Assessment: None - Denies Pain  Last documented pain score (0-10 scale)    Last documented pain medication administered:   Mental Status: oriented  NIH Score:    C-SSRS:    Bedside swallow:    Jose M Coma Scale (GCS): Jose M Coma Scale  Eye Opening: Spontaneous  Best Verbal Response: Oriented  Best Motor Response: Obeys commands  Jose M Coma Scale Score: 15  Active LDA's:   Peripheral IV 11/13/22 Left Forearm (Active)     PO Status: Regular  Pertinent or High Risk Medications/Drips: no   o If Yes, please provide details:   Pending Blood Product Administration: no     You may also review the ED PT Care Timeline found under the Summary Nursing Index tab. Recommendation    Pending orders ADMIT ORDERS  Plan for Discharge (if known):    Additional Comments:  SYNCOPAL EPISODE AFTER DINNER, ALLERGIC REACTION  If any further questions, please call Sending RN at [employer]    Electronically signed by: Electronically signed by Juan Bosch RN on 11/14/2022 at 2:15 AM       Olesya Ron RN  11/14/22 [address] RN  11/14/22 [id number]

## 2022-11-14 NOTE — PROGRESS NOTES
4 Eyes Skin Assessment     NAME:  Luis Miguel Onofre  YOB: 1945  MEDICAL RECORD NUMBER:  [id number]    The patient is being assess for  Admission    I agree that 2 RN's have performed a thorough Head to Toe Skin Assessment on the patient. ALL assessment sites listed below have been assessed. Areas assessed by both nurses:    Head, Face, Ears, Shoulders, Back, Chest, Arms, Elbows, Hands, Sacrum. Buttock, Coccyx, Ischium, Legs. Feet and Heels, and Other n/a        Does the Patient have a Wound?  No noted wound(s)       Kennedy Prevention initiated:  NA   Wound Care Orders initiated:  NA    Pressure Injury (Stage 3,4, Unstageable, DTI, NWPT, and Complex wounds) if present place referral/consult order under [de-identified] NA    New and Established Ostomies if present place consult order under : NA      Nurse 1 eSignature: Electronically signed by Ela Solano RN on 11/14/22 at 4:35 AM EST    **SHARE this note so that the co-signing nurse is able to place an eSignature**    Nurse 2 eSignature: Electronically signed by Meryl Gibson RN on 11/14/22 at 5:00 AM EST

## 2022-11-14 NOTE — ED PROVIDER NOTES
Triage Chief Complaint:   Loss of Consciousness (WITNESSED BY FAMILY, HIT HEAD, RECENT CHANGES IN BP MEDS, DAILY ASA NO OTHER THINNERS) and Rib Injury (LEFT )    [place]:  Bindu Pradhan is a 78 y.o. male that presents with passing out. Patient was in baseline state of health until dinner this evening when he became immediately unwell after eating some \"coated green beans\". Patient felt immediate nausea and developed an itchy diffuse rash. Patient was walking to the kitchen and the next thing he noticed he was on the ground being awoken by his wife. Wife is present and reports patient did strike his head and did lose consciousness for under 1 minute. Patient does currently feel an irritated throat nausea and itchy rash particular to the groin and abdomen and chest and arms. Patient has had similar issue in the past when he was stung by bees. 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 Patient presently does report that his left side of his ribs feel sore and is as well as his left side of his head. No neck or back pains. No numbness or weakness. No abdominal pain. No pain to the extremities. Patient does have coronary disease status post CABG 14 months ago and is recovered well from this.   Patient is on anticoagulation of aspirin    ROS:  General:  No fevers, no chills, no weakness  Eyes:  No recent vison changes, no discharge  ENT:  + sore throat (irritated), no nasal congestion, no hearing changes  Cardiovascular:  + chest pain, no palpitations  Respiratory:  No shortness of breath, no cough, no wheezing  Gastrointestinal:  No pain, + nausea, no vomiting, no diarrhea  Musculoskeletal:  No muscle pain, no joint pain  Skin:  No rash, no pruritis, no easy bruising  Neurologic:  No speech problems, + headache, no extremity numbness, no extremity tingling, no extremity weakness  Psychiatric:  No anxiety  Genitourinary:  No dysuria, no hematuria  Endocrine:  No unexpected weight gain, no unexpected weight loss  Extremities:  no edema, no pain    Past Medical History:   Diagnosis Date    Abnormal Heart Score CT 03/04/2005    Arthritis     CAD (coronary artery disease)     Cancer ([facility] [address] 2019 and 2020    Bladder    Diabetes mellitus ([facility] [address]     Elevated d-dimer 01/16/2020    Fatigue 06/18/2018    H/O cardiac catheterization 09/08/2021    STENOSIS TO : LAD, OM, CIRC, PDA & RCA, CABG evaluation. H/O echocardiogram 01/27/2020    EF 17-82, Grade I diastolic dysfunction.     History of exercise stress test 01/27/2020    Treadmill, normal stress test, THR achieved    History of nuclear stress test 08/23/2021    EF 55%, ETT negative for ischemia/arrhythmia, Medium sized area of moderate inferior wall ischemia    Hyperlipidemia     Hypertension     Obesity 02/15/2000    Psoriasis 03/09/2004     Past Surgical History:   Procedure Laterality Date    BLADDER SURGERY      remove lesion 2019 and [address]    COLONOSCOPY  10/22/2003    polyp x1    COLONOSCOPY  2010    Normal exam    COLONOSCOPY  12/05/2017    single 2mm polyp found in proximal ascending colon, diverticulosis found in sigmoid colon    CORONARY ARTERY BYPASS GRAFT N/A 9/10/2021    CABG CORONARY ARTERY BYPASS performed by Cheyenne Muro MD at [facility] Right 2013    FRACTURE SURGERY Right 1969    tibia    FRACTURE SURGERY Left 1994    shoulder    FRACTURE SURGERY  2011    Nose & facial bones    HERNIA REPAIR  7159    Umbilical     Family History   Problem Relation Age of Onset    Stroke Mother     Diabetes Father     Diabetes Brother     Diabetes Paternal Grandmother     Diabetes Brother     Diabetes Brother      Social History     Socioeconomic History    Marital status:      Spouse name: Not on file    Number of children: Not on file    Years of education: Not on file    Highest education level: Not on file   Occupational History    Not on file Tobacco Use    Smoking status: Former     Types: Cigars    Smokeless tobacco: Never    Tobacco comments:     also former cigarette smoker. 8/30/2021   Substance and Sexual Activity    Alcohol use: Not Currently    Drug use: No    Sexual activity: Not on file   Other Topics Concern    Not on file   Social History Narrative    Not on file     Social Determinants of Health     Financial Resource Strain: Not on file   Food Insecurity: Not on file   Transportation Needs: Not on file   Physical Activity: Not on file   Stress: Not on file   Social Connections: Not on file   Intimate Partner Violence: Not on file   Housing Stability: Not on file     Current Facility-Administered Medications   Medication Dose Route Frequency Provider Last Rate Last Admin    0.9 % sodium chloride bolus  1,000 mL IntraVENous Once Lois Laughlin .6 mL/hr at 11/13/22 2157 1,000 mL at 11/13/22 2157     Current Outpatient Medications   Medication Sig Dispense Refill    [START ON 11/14/2022] predniSONE (DELTASONE) 20 MG tablet Take 2 tablets by mouth 2 times daily for 4 days 16 tablet 0    famotidine (PEPCID) 20 MG tablet Take 1 tablet by mouth 2 times daily for 4 days 8 tablet 0    EPINEPHrine (EPIPEN 2-KHARI) 0.3 MG/0.3ML SOAJ injection Inject 0.3 mLs into the muscle once for 1 dose Use as directed for severe allergic reaction 0.3 mL 0    [START ON 11/14/2022] diphenhydrAMINE (BENADRYL ALLERGY) 25 MG tablet Take 2 tablets by mouth every 8 hours as needed for Itching 25 tablet 0    finasteride (PROSCAR) 5 MG tablet Take 5 mg by mouth daily      tadalafil (CIALIS) 5 MG tablet TAKE 1 TABLET BY MOUTH DAILY.       metoprolol tartrate (LOPRESSOR) 25 MG tablet Take 0.5 tablets by mouth 2 times daily 180 tablet 3    metFORMIN (GLUCOPHAGE) 1000 MG tablet Take 1 tablet by mouth 2 times daily (with meals) 60 tablet 3    latanoprost (XALATAN) 0.005 % ophthalmic solution Apply 1 drop to eye daily      pravastatin (PRAVACHOL) 40 MG tablet 1 tablet daily JANUVIA 100 MG tablet 1 tablet daily      Coenzyme Q10 (CO Q 10 PO) Take by mouth daily      Multiple Vitamins-Minerals (OCUVITE ADULT 50+ PO) Take by mouth daily      glimepiride (AMARYL) 4 MG tablet Take 4 mg by mouth every morning (before breakfast)      aspirin 81 MG tablet Take 81 mg by mouth daily (Patient not taking: Reported on 11/10/2022)      Multiple Vitamins-Minerals (CENTRUM SILVER 50+MEN PO) Take by mouth       Allergies   Allergen Reactions    Clarithromycin     Levaquin [Levofloxacin] Hives    Bicillin [Penicillin G Benzathine] Rash       Nursing Notes Reviewed    Physical Exam:  ED Triage Vitals [11/13/22 2047]   Enc Vitals Group      BP (!) 151/79      Heart Rate 75      Resp 17      Temp 98.1 °F (36.7 °C)      Temp Source Oral      SpO2 95 %      Weight 200 lb (90.7 kg)      Height 5' 9\" (1.753 m)      Head Circumference       Peak Flow       Pain Score       Pain Loc       Pain Edu? Excl. in [address]? My pulse ox interpretation is - normal    General appearance:  No acute distress. Skin:  Warm. Dry. Darkening of the skin of bilateral face which patient reports is chronic from a long ago medication reaction. Patient does have diffuse erythema to the arms and torso consistent with urticaria. No blistering or skin weeping. Eye:  Extraocular movements intact. Pupils are equal round and reactive to light. Ears, nose, mouth and throat:  Oral mucosa moist.  Posterior oropharynx is without significant edema. Mild erythema noted. Neck:  Trachea midline. No stridor. Extremity:  No swelling. Normal ROM     Heart:  Regular rate and rhythm, normal S1 & S2, no extra heart sounds. Perfusion:  Intact   Respiratory:  Lungs clear to auscultation bilaterally. No wheezing. Respirations nonlabored. Left-sided chest wall and ribs is tender to palpation reproducing pain. No crepitus. Abdominal:  Normal bowel sounds. Soft. Nontender. Non distended.   Back:  No CVA tenderness to palpation Neurological:  Alert and oriented times 3. No focal neuro deficits.   Sensation intact to light touch to distal upper/lower extremities; 5/5 and symmetric  and dorsi/plantar flexion           Psychiatric:  Appropriate    I have reviewed and interpreted all of the currently available lab results from this visit (if applicable):  Results for orders placed or performed during the hospital encounter of 11/13/22   CBC with Auto Differential   Result Value Ref Range    WBC 6.1 4.0 - 10.5 K/CU MM    RBC 5.01 4.6 - 6.2 M/CU MM    Hemoglobin 15.0 13.5 - 18.0 GM/DL    Hematocrit 45.1 42 - 52 %    MCV 90.0 78 - 100 FL    MCH 29.9 27 - 31 PG    MCHC 33.3 32.0 - 36.0 %    RDW 12.5 11.7 - 14.9 %    Platelets 113 759 - 175 K/CU MM    MPV 11.5 (H) 7.5 - 11.1 FL    Differential Type AUTOMATED DIFFERENTIAL     Segs Relative 56.2 36 - 66 %    Lymphocytes % 31.8 24 - 44 %    Monocytes % 8.6 (H) 0 - 4 %    Eosinophils % 2.6 0 - 3 %    Basophils % 0.3 0 - 1 %    Segs Absolute 3.4 K/CU MM    Lymphocytes Absolute 1.9 K/CU MM    Monocytes Absolute 0.5 K/CU MM    Eosinophils Absolute 0.2 K/CU MM    Basophils Absolute 0.0 K/CU MM    Nucleated RBC % 0.0 %    Total Nucleated RBC 0.0 K/CU MM    Total Immature Neutrophil 0.03 K/CU MM    Immature Neutrophil % 0.5 (H) 0 - 0.43 %   Comprehensive Metabolic Panel   Result Value Ref Range    Sodium 136 135 - 145 MMOL/L    Potassium 3.8 3.5 - 5.1 MMOL/L    Chloride 100 99 - 110 mMol/L    CO2 23 21 - 32 MMOL/L    BUN 17 6 - 23 MG/DL    Creatinine 0.8 (L) 0.9 - 1.3 MG/DL    Est, Glom Filt Rate >60 >60 mL/min/1.73m2    Glucose 213 (H) 70 - 99 MG/DL    Calcium 9.4 8.3 - 10.6 MG/DL    Albumin 4.0 3.4 - 5.0 GM/DL    Total Protein 7.2 6.4 - 8.2 GM/DL    Total Bilirubin 0.3 0.0 - 1.0 MG/DL    ALT 40 10 - 40 U/L    AST 45 (H) 15 - 37 IU/L    Alkaline Phosphatase 71 40 - 129 IU/L    Anion Gap 13 4 - 16   Troponin   Result Value Ref Range    Troponin T <0.010 <0.01 NG/ML   POC Glucose   Result Value Ref Range    Glucose 162 mg/dL   POCT Glucose   Result Value Ref Range    POC Glucose 162 (H) 70 - 99 MG/DL   EKG 12 Lead   Result Value Ref Range    Ventricular Rate 71 BPM    Atrial Rate 71 BPM    P-R Interval 192 ms    QRS Duration 96 ms    Q-T Interval 424 ms    QTc Calculation (Bazett) 460 ms    P Axis 51 degrees    R Axis -33 degrees    T Axis 48 degrees    Diagnosis       Normal sinus rhythm  Possible Left atrial enlargement  Left axis deviation  Left ventricular hypertrophy  Abnormal ECG  When compared with ECG of 14-SEP-2021 08:06,  QRS axis shifted left  Minimal criteria for Anterior infarct are no longer present        Radiographs (if obtained):  [] The following radiograph was interpreted by myself in the absence of a radiologist:   [] Radiologist's Report Reviewed:  CT CHEST WO CONTRAST   Preliminary Result   1. No acute traumatic injury within the chest.   2. Severe esophageal reflux. 3. Nodularity of the contour of the liver, concerning for cirrhosis. CT CERVICAL SPINE WO CONTRAST   Final Result   No acute osseous abnormality of the cervical spine. CT HEAD WO CONTRAST   Final Result   No acute intracranial abnormality. EKG (if obtained): (All EKG's are interpreted by myself in the absence of a cardiologist)  12 lead EKG per my interpretation:  Normal Sinus Rhythm at 71  Axis is   Left axis deviation  QTc is  within an acceptable range  There is no specific T wave changes appreciated. There is no specific ST wave changes appreciated. No STEMI    Prior EKG to compare with was available and no clinically significant change in overall morphology when compared to prior. Chart review shows recent radiographs:  No results found. MDM:  Pt presents as above. Emergent conditions considered. Presentation prompted initial MD evaluation. IVs established and patient placed on monitor. EKG and point-of-care glucose are obtained.   Point-of-care glucose with mild hyperglycemia. Patient is with evidence of anaphylaxis and IV Solu-Medrol, oral Benadryl, IV Pepcid and intramuscular epinephrine are all ordered. IV fluid bolus is also ordered. I do believe anaphylaxis is driving patient's underlying syncope episode given the abrupt onset of symptoms. CBC and CMP without clinically significant derangement other than mild hyperglycemia without elevated anion gap metabolic acidosis. Troponin is thankfully negative. EKG is with a normal sinus rhythm as above. CT head and neck are negative for acute traumatic injury. CT chest is negative for acute traumatic injury. Reflux/esophagitis is noted. Given epinephrine administration patient will need 3-hour observation for rebound reaction. Plan for delta troponin repeat EKG as well. Patient is agreeable with this plan and is feeling much improved on my recheck with resolution of his rash and itchiness. Patient does feel \"jittery\" from the epinephrine however no other new complaints. Patient signed out to overnight physician, Dr. Castro Tomlinson, pending the above. Questions sought and answered with the patient and spouse. They voice understanding and agree with plan. Is this patient to be included in the SEP-1 Core Measure due to severe sepsis or septic shock? No   Exclusion criteria - the patient is NOT to be included for SEP-1 Core Measure due to: Infection is not suspected      CRITICAL CARE NOTE:  There was a high probability of clinically significant life-threatening deterioration of the patient's condition requiring my urgent intervention due to anaphylaxis. IV steroid administration, intramuscular epinephrine administration, IV fluid administration, telemetry monitoring, chart review including home medications and prior cardiology visits following CABG and frequent rechecks and patient counseling throughout ED course was performed to address this. Total critical care time is 40 minutes.     This includes vital sign monitoring, pulse oximetry monitoring, telemetry monitoring, clinical response to the IV medications, reviewing the nursing notes, consultation time, dictation/documentation time, and interpretation of the lab work. This time excludes time spent performing procedures and separately billable procedures and family discussion time. Care of this patient occurred during the COVID-19 pandemic. Clinical Impression:  1. Anaphylaxis, initial encounter    2. Syncope and collapse    3. Closed head injury, initial encounter    4. Contusion of left chest wall, initial encounter    5. Gastroesophageal reflux disease with esophagitis without hemorrhage      Disposition referral (if applicable):  No follow-up provider specified. Disposition medications (if applicable):  New Prescriptions    DIPHENHYDRAMINE (BENADRYL ALLERGY) 25 MG TABLET    Take 2 tablets by mouth every 8 hours as needed for Itching    EPINEPHRINE (EPIPEN 2-KHARI) 0.3 MG/0.3ML SOAJ INJECTION    Inject 0.3 mLs into the muscle once for 1 dose Use as directed for severe allergic reaction    FAMOTIDINE (PEPCID) 20 MG TABLET    Take 1 tablet by mouth 2 times daily for 4 days    PREDNISONE (DELTASONE) 20 MG TABLET    Take 2 tablets by mouth 2 times daily for 4 days       Comment: Please note this report has been produced using speech recognition software and may contain errors related to that system including errors in grammar, punctuation, and spelling, as well as words and phrases that may be inappropriate. If there are any questions or concerns please feel free to contact the dictating provider for clarification. I, Dr. Ana Shaikh, am the primary clinician of record for this encounter.          Pari Kamara MD  11/13/22 8841

## 2022-12-05 ENCOUNTER — NURSE ONLY (OUTPATIENT)
Dept: CARDIOLOGY CLINIC | Age: 77
End: 2022-12-05

## 2022-12-05 DIAGNOSIS — R55 SYNCOPE, UNSPECIFIED SYNCOPE TYPE: Primary | ICD-10-CM

## 2023-01-12 ENCOUNTER — TELEPHONE (OUTPATIENT)
Dept: CARDIOLOGY CLINIC | Age: 78
End: 2023-01-12

## 2023-01-12 NOTE — TELEPHONE ENCOUNTER
----- Message from Dagmar Jason MD sent at 1/12/2023  3:12 PM EST -----  30-day monitor worn from 5 December to 3 January 2023 with 2 patient triggers and to auto triggers lowest heart rate 52 at 5:32 PM average heart rate 73 highest heart rate 70 1:10 AM.  Patient was primarily in sinus rhythm occasional PACs and PVC noted normal 30-day monitor    May speak to pt, wife [place], or sons Cyrus/jones    Spoke to pt regarding results. Patient advised and voices understanding.

## 2023-02-17 ENCOUNTER — TELEPHONE (OUTPATIENT)
Dept: GASTROENTEROLOGY | Age: 78
End: 2023-02-17

## 2023-02-17 NOTE — TELEPHONE ENCOUNTER
Pt. Called in to [facility] repeat cscope. Procedure scheduled on 2/27/23. Pt was instructed to come into office to get prep packet. Prep was faxed to [facility]. Cardiac clearance request sent to dr. Loreto Perez.

## 2023-02-20 ENCOUNTER — TELEPHONE (OUTPATIENT)
Dept: CARDIOLOGY CLINIC | Age: 78
End: 2023-02-20

## 2023-02-20 ENCOUNTER — OFFICE VISIT (OUTPATIENT)
Dept: CARDIOLOGY CLINIC | Age: 78
End: 2023-02-20
Payer: MEDICARE

## 2023-02-20 VITALS
DIASTOLIC BLOOD PRESSURE: 60 MMHG | BODY MASS INDEX: 27.99 KG/M2 | HEIGHT: 69 IN | SYSTOLIC BLOOD PRESSURE: 90 MMHG | WEIGHT: 189 LBS | HEART RATE: 62 BPM

## 2023-02-20 DIAGNOSIS — I95.1 ORTHOSTATIC HYPOTENSION: ICD-10-CM

## 2023-02-20 DIAGNOSIS — E78.2 MIXED HYPERLIPIDEMIA: ICD-10-CM

## 2023-02-20 DIAGNOSIS — I25.10 CAD IN NATIVE ARTERY: ICD-10-CM

## 2023-02-20 DIAGNOSIS — R07.9 CHEST PAIN, UNSPECIFIED TYPE: ICD-10-CM

## 2023-02-20 DIAGNOSIS — I10 PRIMARY HYPERTENSION: ICD-10-CM

## 2023-02-20 DIAGNOSIS — M79.89 RIGHT LEG SWELLING: ICD-10-CM

## 2023-02-20 DIAGNOSIS — R91.1 PULMONARY NODULE: Primary | ICD-10-CM

## 2023-02-20 DIAGNOSIS — G47.33 OSA (OBSTRUCTIVE SLEEP APNEA): ICD-10-CM

## 2023-02-20 DIAGNOSIS — E11.9 TYPE 2 DIABETES MELLITUS WITHOUT COMPLICATION, WITHOUT LONG-TERM CURRENT USE OF INSULIN (HCC): ICD-10-CM

## 2023-02-20 PROCEDURE — G8427 DOCREV CUR MEDS BY ELIG CLIN: HCPCS | Performed by: INTERNAL MEDICINE

## 2023-02-20 PROCEDURE — G8484 FLU IMMUNIZE NO ADMIN: HCPCS | Performed by: INTERNAL MEDICINE

## 2023-02-20 PROCEDURE — 99214 OFFICE O/P EST MOD 30 MIN: CPT | Performed by: INTERNAL MEDICINE

## 2023-02-20 PROCEDURE — G8417 CALC BMI ABV UP PARAM F/U: HCPCS | Performed by: INTERNAL MEDICINE

## 2023-02-20 PROCEDURE — 3074F SYST BP LT 130 MM HG: CPT | Performed by: INTERNAL MEDICINE

## 2023-02-20 PROCEDURE — 1036F TOBACCO NON-USER: CPT | Performed by: INTERNAL MEDICINE

## 2023-02-20 PROCEDURE — 1124F ACP DISCUSS-NO DSCNMKR DOCD: CPT | Performed by: INTERNAL MEDICINE

## 2023-02-20 PROCEDURE — 3078F DIAST BP <80 MM HG: CPT | Performed by: INTERNAL MEDICINE

## 2023-02-20 NOTE — TELEPHONE ENCOUNTER
Cardiologist: Dr. Alesha Vargas  Surgeon: Dr. Fajardo  Surgery: Colonoscopy  Anesthesia: Propofol  Date: 2/27/2023  FAX# [phone]  # [phone]    Last OV 8/23/2022 w/Connie      ASCVD  S/p CABG with LIMA to LAD and Diag, SVG to OM and SVG to PDA in sept 2021. . On  metoprolol 12.5 twice daily , cannot tolerate higher dose . He bp is better      Hypertension   Advised to check blood pressure at home . metoprolol 12.5 twice daily for cardiac protection and to help with angina  Never had MI and there is no strong indication of metoprolol      Lack of energy  Will refer to sleep apnea eval     Diabetes mellitus ([facility] [address]   Followed closely by Dr. Ezekiel Sigala      Dyslipidemia :  All available lab work was reviewed. Patient was advised to repeat lab work before next visit. Necessary orders were placed , instructions given by myself   LDL is in 60's         Last EKG- 11/14/2022          Stress Test- 10/8/2021   Reduced exercise performance without angina and ischemic EKG changes. for rehab    Functional Capacity:       Echo- 11/14/2022   Left ventricular systolic function is normal.   Ejection fraction is visually estimated at 50-55%. Grade I diastolic dysfunction. Moderately dilated left atrium. Mildly dilated right ventricle. Sclerotic, but non-stenotic aortic valve. No evidence of any pericardial effusion. Cath- 9/8/2021   1. LAD has proximal has 70-80 % lesion , mid LAD has 90 % lesion   , diffuse Diag disease   2. OM has 70-80 % lesion , Circ has 90 % lesion   3.  RCA has proximal 80-90 % lesion , PDA has 90 % lesion         CABG- 9/10/2021

## 2023-02-20 NOTE — PROGRESS NOTES
CARDIOLOGY   NOTE    Chief Complaint: Chest pain abnormal stress test    HPI:   Ty Kinney is a 70y.o. year old who has Past medical history as noted below. He says he is better,bp  is improved not on midodrine any more , stopped flomax . His wife is concerned that he does not have enough energy but sleep apnea has mild , He was on cruise had leg swelling   Kit Adames has  Coronary artery bypass grafting x4, left internal mammary artery,sequenced to diagonal branch artery and LAD. Reverse saphenous vein,graft anastomosed to obtuse marginal 1 artery, reverse saphenous vein,graft anastomosed to distal right coronary artery in September 2021 . Diabetes is better after staring [facility] [place]   He says dizziness is  Better . EKG today shows sinus rhythm  Due to ongoing palpitations he had Holter monitor in July 2020 which did not show any significant arrhythmias. Blood pressures been running in 120's  at home  He is on nitro as needed but has not needed to use it so far      Current Outpatient Medications   Medication Sig Dispense Refill    finasteride (PROSCAR) 5 MG tablet Take 5 mg by mouth daily      tadalafil (CIALIS) 5 MG tablet TAKE 1 TABLET BY MOUTH DAILY.       metoprolol tartrate (LOPRESSOR) 25 MG tablet Take 0.5 tablets by mouth 2 times daily 180 tablet 3    metFORMIN (GLUCOPHAGE) 1000 MG tablet Take 1 tablet by mouth 2 times daily (with meals) (Patient taking differently: Take 500 mg by mouth 2 times daily (with meals)) 60 tablet 3    latanoprost (XALATAN) 0.005 % ophthalmic solution Apply 1 drop to eye daily      pravastatin (PRAVACHOL) 40 MG tablet 1 tablet daily      JANUVIA 100 MG tablet 1 tablet daily      Coenzyme Q10 (CO Q 10 PO) Take by mouth daily      Multiple Vitamins-Minerals (OCUVITE ADULT 50+ PO) Take by mouth daily      glimepiride (AMARYL) 4 MG tablet Take 4 mg by mouth every morning (before breakfast)      Multiple Vitamins-Minerals (CENTRUM SILVER 50+MEN PO) Take by mouth      famotidine (PEPCID) 20 MG tablet Take 1 tablet by mouth 2 times daily for 4 days (Patient not taking: Reported on 2/20/2023) 8 tablet 0    EPINEPHrine (EPIPEN 2-KHARI) 0.3 MG/0.3ML SOAJ injection Inject 0.3 mLs into the muscle once for 1 dose Use as directed for severe allergic reaction (Patient not taking: Reported on 2/20/2023) 0.3 mL 0     No current facility-administered medications for this visit. Allergies:   Clarithromycin, Levaquin [levofloxacin], and Bicillin [penicillin g benzathine]    Patient History:  Past Medical History:   Diagnosis Date    Abnormal Heart Score CT 03/04/2005    Arthritis     CAD (coronary artery disease)     Cancer ([facility] [address] 2019 and 2020    Bladder    Diabetes mellitus ([facility] [address]     Elevated d-dimer 01/16/2020    Fatigue 06/18/2018    H/O cardiac catheterization 09/08/2021    STENOSIS TO : LAD, OM, CIRC, PDA & RCA, CABG evaluation. H/O echocardiogram 01/27/2020    EF 66-77, Grade I diastolic dysfunction.     History of exercise stress test 01/27/2020    Treadmill, normal stress test, THR achieved    History of nuclear stress test 08/23/2021    EF 55%, ETT negative for ischemia/arrhythmia, Medium sized area of moderate inferior wall ischemia    Hyperlipidemia     Hypertension     Obesity 02/15/2000    Psoriasis 03/09/2004     Past Surgical History:   Procedure Laterality Date    BLADDER SURGERY      remove lesion 2019 and [address]    COLONOSCOPY  10/22/2003    polyp x1    COLONOSCOPY  2010    Normal exam    COLONOSCOPY  12/05/2017    single 2mm polyp found in proximal ascending colon, diverticulosis found in sigmoid colon    CORONARY ARTERY BYPASS GRAFT N/A 9/10/2021    CABG CORONARY ARTERY BYPASS performed by Nava Curry MD at [facility] Right 2013    FRACTURE SURGERY Right 1969    tibia    FRACTURE SURGERY Left 1994    shoulder    FRACTURE SURGERY  2011    Nose & facial bones    [facility] History   Problem Relation Age of Onset    Stroke Mother     Diabetes Father     Diabetes Brother     Diabetes Paternal Grandmother     Diabetes Brother     Diabetes Brother      Social History     Tobacco Use    Smoking status: Former     Types: Cigars    Smokeless tobacco: Never    Tobacco comments:     also former cigarette smoker. 8/30/2021   Substance Use Topics    Alcohol use: Not Currently        Review of Systems:   Constitutional: No Fever or Weight Loss   Eyes: No Decreased Vision  ENT: No Headaches, Hearing Loss or Vertigo  Cardiovascular: as per note above   Respiratory: No cough or wheezing and as per note above. Gastrointestinal: No abdominal pain, appetite loss, blood in stools, constipation, diarrhea or heartburn  Genitourinary: No dysuria, trouble voiding, or hematuria  Musculoskeletal:  denies any new  joint aches , swelling  or pain   Integumentary: No rash or pruritis  Neurological: No TIA or stroke symptoms  Psychiatric: No anxiety or depression  Endocrine: No malaise, fatigue or temperature intolerance  Hematologic/Lymphatic: No bleeding problems, blood clots or swollen lymph nodes  Allergic/Immunologic: No nasal congestion or hives    Objective:      Physical Exam:  BP 90/60   Pulse 62   Ht 5' 9\" (1.753 m)   Wt 189 lb (85.7 kg)   BMI 27.91 kg/m²   Wt Readings from Last 3 Encounters:   02/20/23 189 lb (85.7 kg)   11/14/22 189 lb 6 oz (85.9 kg)   09/07/22 185 lb (83.9 kg)     Body mass index is 27.91 kg/m². Vitals:    02/20/23 1200   BP: 90/60   Pulse: 62        General Appearance:  No distress, conversant  Constitutional:  Well developed, Well nourished, No acute distress, Non-toxic appearance. HENT:  Normocephalic, Atraumatic, Bilateral external ears normal, Oropharynx moist, No oral exudates, Nose normal. Neck- Normal range of motion, No tenderness, Supple, No stridor,no apical-carotid delay  Eyes:  PERRL, EOMI, Conjunctiva normal, No discharge.    Respiratory:  Normal breath sounds, No respiratory distress, No wheezing, No chest tenderness. ,no use of accessory muscles, NO crackles  Cardiovascular: (PMI) apex non displaced,no lifts no thrills,S1 and S2 audible, No added heart sounds, No signs of ankle edema, or volume overload, No evidence of JVD, No crackles  GI:  Bowel sounds normal, Soft, No tenderness, No masses, No gross visceromegaly   :  No costovertebral angle tenderness   Musculoskeletal:  No edema, no tenderness, no deformities. Back- no tenderness  Integument:  Well hydrated, no rash   Lymphatic:  No lymphadenopathy noted   Neurologic:  Alert & oriented x 3, CN 2-12 normal, normal motor function, normal sensory function, no focal deficits noted   Psychiatric:  Speech and behavior appropriate       Medical decision making and Data review:  DATA:  Lab Results   Component Value Date    TROPONINT <0.010 11/14/2022     BNP:  No results found for: PROBNP  PT/INR:  No results found for: PTINR  Lab Results   Component Value Date    LABA1C 7.0 (H) 11/14/2022    LABA1C 7.5 (H) 09/08/2021     Lab Results   Component Value Date    CHOL 131 03/21/2022    TRIG 127 03/21/2022    HDL 38 (L) 03/21/2022    LDLCALC 68 03/21/2022     Lab Results   Component Value Date    ALT 40 11/13/2022    AST 45 (H) 11/13/2022     No results for input(s): WBC, HGB, HCT, MCV, PLT in the last 72 hours. TSH: No results found for: TSH  Lab Results   Component Value Date    AST 45 (H) 11/13/2022    ALT 40 11/13/2022    BILITOT 0.3 11/13/2022    ALKPHOS 71 11/13/2022     No results found for: PROBNP  Lab Results   Component Value Date    LABA1C 7.0 (H) 11/14/2022    LABA1C 7.5 (H) 09/08/2021     Lab Results   Component Value Date    WBC 6.1 11/13/2022    HGB 15.0 11/13/2022    HCT 45.1 11/13/2022     11/13/2022     Stress test  8/23/21      Summary    Abnormal Study. Limited exercise capacity. 5 METs work load. Physiological BP response to exercise. ETT negative for Ischemia / Arrhythmia.     Medium sized area of moderate inferior wall Ischemia. Normal LV function. LVEF is 55 %. Supervising physician Dr. Lisseth Baez . Cath      Procedure Summary   Access : radial Indication : abnormal stress test   1. LAD has proximal has 70-80 % lesion , mid LAD has 90 % lesion   , diffuse Diag disease   2. OM has 70-80 % lesion , Circ has 90 % lesion   3. RCA has proximal 80-90 % lesion , PDA has 90 % lesion    Echo 9/8/21   Conclusions      Summary   Left ventricular systolic function is normal.   Ejection fraction is visually estimated at 50-55%. Mild left ventricular hypertrophy. Indeterminate diastolic function; E/A flow reversal is noted. Sclerotic, but non-stenotic aortic valve. No evidence of any pericardial effusion. Nov 2022  Summary   Left ventricular systolic function is normal.   Ejection fraction is visually estimated at 50-55%. Grade I diastolic dysfunction. Moderately dilated left atrium. Mildly dilated right ventricle. Sclerotic, but non-stenotic aortic valve. No evidence of any pericardial effusion. Event monitor 1/2023  30-day monitor worn from 5 December to 3 January 2023 with 2 patient triggers and to auto triggers lowest heart rate 52 at 5:32 PM average heart rate 73 highest heart rate 70 1:10 AM.  Patient was primarily in sinus rhythm occasional PACs and PVC noted normal 30-day monitor        All labs, medications and tests reviewed by myself including data and history from outside source , patient and available family . Assessment & Plan:      1. Pulmonary nodule    2. LIMA (obstructive sleep apnea)    3. Orthostatic hypotension    4. Primary hypertension    5. Mixed hyperlipidemia    6. Type 2 diabetes mellitus without complication, without long-term current use of insulin (HCC)    7. Chest pain, unspecified type    8. CAD in native artery    9. Right leg swelling           ASCVD  S/p CABG with LIMA to LAD and Diag, SVG to OM and SVG to PDA in sept 2021. Huma Garcia  On metoprolol 12.5 twice daily , cannot tolerate higher dose . He bp is better   Take aspirin 81 mg daily     Hypertension  Bp is in 90's but not symptomatic Advised to check blood pressure at home . metoprolol 12.5 twice daily for cardiac protection and to help with angina  Never had MI and there is no strong indication of metoprolol   He used to be on midodrine but not on it anymore     Lack of energy  Sleep apnea mild , did not want cpap , still waiting to hear from orthodontist     Right leg vericose  Get ultrasound rule out DVT due to swelling    Diabetes mellitus ([facility] [address]   Followed closely by Dr. Alyx Jolly     Dyslipidemia :  All available lab work was reviewed. Patient was advised to repeat lab work before next visit. Necessary orders were placed , instructions given by myself   LDL is in 63's       Counseled extensively and medication compliance urged. We discussed that for the  prevention of ASCVD our  goal is aggressive risk modification. Patient is encouraged to exercise if they can , educated about  brisk walk for 30 minutes  at least 3 to 4 times a week if there are no physical limitations  Various goals were discussed and questions answered. Continue current medications. Appropriate prescriptions are addressed and refills ordered. Questions answered and patient verbalizes understanding. Call for any problems, questions, or concerns. Greater than 60 % of time spent counseling besides reviewing data and images     Continue all other medications of all above medical condition listed as is. No follow-ups on file. Please note this report has been partially produced using speech recognition software and may contain errors related to that system including errors in grammar, punctuation, and spelling, as well as words and phrases that may be inappropriate. If there are any questions or concerns please feel free to contact the dictating provider for clarification.

## 2023-02-20 NOTE — TELEPHONE ENCOUNTER
Proceed with surgery no further testing needed   Can hold anticoagulation for 2 days before surgery  Low   risk for surgery procedure    aspirin for procedure

## 2023-02-21 RX ORDER — ASPIRIN 81 MG/1
81 TABLET ORAL DAILY
COMMUNITY

## 2023-02-21 NOTE — PROGRESS NOTES
Patient will be called with an arrival time on 2/24/2023 for his procedure at [facility] on 2/27/2023.               1. Do not eat or drink anything after midnight - unless instructed by your doctor prior to surgery. This includes                   no water, chewing gum or mints. 2. Follow your directions as prescribed by the doctor for your procedure and medications. Morning of surgery take:    3. Check with your Doctor regarding stopping vitamins, supplements, blood thinners and follow their instructions. Stop vitamins, supplements and NSAIDS:    4. Do not smoke, vape or use chewing tobacco morning of surgery. Do not drink any alcoholic beverages 24 hours prior to surgery. This includes NA Beer. No street drugs 7 days prior to surgery. 5. You may brush your teeth and gargle the morning of surgery. DO NOT SWALLOW WATER   6. You MUST make arrangements for a responsible adult to take you home after your surgery and be able to check on you every couple                   hours for the day. You will not be allowed to leave alone or drive yourself home. It is strongly suggested someone stay with you the first 24                   hrs. Your surgery will be cancelled if you do not have a ride home. 7. Please wear simple, loose fitting clothing to the hospital.  Ramos Angst not bring valuables (money, credit cards, checkbooks, etc.) Do not wear any                   makeup (including no eye makeup) or nail polish on your fingers or toes. 8. DO NOT wear any jewelry or piercings on day of surgery. All body piercing jewelry must be removed. 9. If you have dentures, they will be removed before going to the OR; we will provide you a container. If you wear contact lenses or glasses,                  they will be removed; please bring a case for them. 10. If you  have a Living Will and Durable Power of  for Healthcare, please bring in a copy.            11. Please bring picture ID,  insurance card, paperwork from the doctors office    (H & P, Consent, & card for implantable devices). 12. Take a shower with Hibiclens or an antibacterial soap the night before your surgery (put clean sheets on your bed). Take a 2nd shower with the antibacterial soap the morning of surgery. No clipping or shaving prior to surgery. Do not apply any make-up, deodorant, lotion, oil or powder after the    morning shower. 15.  Enter thru the main entrance on the day of surgery. Patient will take his metformin and metoprolol the morning of his procedure. Spoke with patient's wife Pretty Gregory and she had no questions concerning colon prep instructions at this time.

## 2023-02-24 ENCOUNTER — ANESTHESIA EVENT (OUTPATIENT)
Dept: ENDOSCOPY | Age: 78
End: 2023-02-24
Payer: MEDICARE

## 2023-02-24 NOTE — ANESTHESIA PRE PROCEDURE
Department of Anesthesiology  Preprocedure Note       Name:  Justice Castano   Age:  70 y.o.  :  1945                                          MRN:  [id number]         Date:  2023      Surgeon: Stephy Laughlin):  Raul Ruiz MD    Procedure: Procedure(s):  COLONOSCOPY DIAGNOSTIC    Medications prior to admission:   Prior to Admission medications    Medication Sig Start Date End Date Taking? Authorizing Provider   aspirin 81 MG EC tablet Take 81 mg by mouth daily   Yes Historical Provider, MD   famotidine (PEPCID) 20 MG tablet Take 1 tablet by mouth 2 times daily for 4 days  Patient not taking: Reported on 22  Shahbaz Ugalde MD   EPINEPHrine (EPIPEN 2-KHARI) 0.3 MG/0.3ML SOAJ injection Inject 0.3 mLs into the muscle once for 1 dose Use as directed for severe allergic reaction  Patient not taking: Reported on 22  Shahbaz Ugalde MD   finasteride (PROSCAR) 5 MG tablet Take 5 mg by mouth daily 21  Historical Provider, MD   tadalafil (CIALIS) 5 MG tablet TAKE 1 TABLET BY MOUTH DAILY.  22   Historical Provider, MD   metoprolol tartrate (LOPRESSOR) 25 MG tablet Take 0.5 tablets by mouth 2 times daily 22   Karl Lazo MD   metFORMIN (GLUCOPHAGE) 1000 MG tablet Take 1 tablet by mouth 2 times daily (with meals)  Patient taking differently: Take 500 mg by mouth 2 times daily (with meals) 21   Heber Dumont PA-C   latanoprost (XALATAN) 0.005 % ophthalmic solution Apply 1 drop to eye daily 7/15/21   Historical Provider, MD   pravastatin (PRAVACHOL) 40 MG tablet 1 tablet daily 21   Historical Provider, MD   JANUVIA 100 MG tablet 1 tablet daily 21   Historical Provider, MD   Coenzyme Q10 (CO Q 10 PO) Take by mouth daily    Historical Provider, MD   Multiple Vitamins-Minerals (OCUVITE ADULT 50+ PO) Take by mouth daily    Historical Provider, MD   glimepiride (AMARYL) 4 MG tablet Take 4 mg by mouth every morning (before breakfast) Historical Provider, MD   Multiple Vitamins-Minerals (CENTRUM SILVER 50+MEN PO) Take by mouth    Historical Provider, MD       Current medications:    No current facility-administered medications for this encounter. Current Outpatient Medications   Medication Sig Dispense Refill    aspirin 81 MG EC tablet Take 81 mg by mouth daily      famotidine (PEPCID) 20 MG tablet Take 1 tablet by mouth 2 times daily for 4 days (Patient not taking: Reported on 2/20/2023) 8 tablet 0    EPINEPHrine (EPIPEN 2-KHARI) 0.3 MG/0.3ML SOAJ injection Inject 0.3 mLs into the muscle once for 1 dose Use as directed for severe allergic reaction (Patient not taking: Reported on 2/20/2023) 0.3 mL 0    finasteride (PROSCAR) 5 MG tablet Take 5 mg by mouth daily      tadalafil (CIALIS) 5 MG tablet TAKE 1 TABLET BY MOUTH DAILY.  metoprolol tartrate (LOPRESSOR) 25 MG tablet Take 0.5 tablets by mouth 2 times daily 180 tablet 3    metFORMIN (GLUCOPHAGE) 1000 MG tablet Take 1 tablet by mouth 2 times daily (with meals) (Patient taking differently: Take 500 mg by mouth 2 times daily (with meals)) 60 tablet 3    latanoprost (XALATAN) 0.005 % ophthalmic solution Apply 1 drop to eye daily      pravastatin (PRAVACHOL) 40 MG tablet 1 tablet daily      JANUVIA 100 MG tablet 1 tablet daily      Coenzyme Q10 (CO Q 10 PO) Take by mouth daily      Multiple Vitamins-Minerals (OCUVITE ADULT 50+ PO) Take by mouth daily      glimepiride (AMARYL) 4 MG tablet Take 4 mg by mouth every morning (before breakfast)      Multiple Vitamins-Minerals (CENTRUM SILVER 50+MEN PO) Take by mouth         Allergies: Allergies   Allergen Reactions    Food Anaphylaxis     His wife states\" that they think it was a breaded birdseye vegetable\".     Clarithromycin     Levaquin [Levofloxacin] Hives    Bicillin [Penicillin G Benzathine] Rash       Problem List:    Patient Active Problem List   Diagnosis Code    Chest pain R07.9    Syncope R55    Diabetes mellitus ([address] E11.9    Hyperlipidemia E78.5    Hypertension I10    CAD in native artery I25.10    Pulmonary nodule R91.1    Orthostatic hypotension I95.1    LIMA (obstructive sleep apnea) G47.33    Hypersomnia G47.10    Overweight (BMI 25.0-29. 9) E66.3    Ex-smoker Z87.891    Anaphylaxis due to food T78.00XA       Past Medical History:        Diagnosis Date    Abnormal Heart Score CT 03/04/2005    Anaphylactic reaction     food he  ate not sure of what it was.  Arthritis     CAD (coronary artery disease)     CABG four bypass grafts.  Cancer ([facility] 2019 and 2020    Bladder    Diabetes mellitus ([facility]     Elevated d-dimer 01/16/2020    Fatigue 06/18/2018    H/O cardiac catheterization 09/08/2021    STENOSIS TO : LAD, OM, CIRC, PDA & RCA, CABG evaluation.  H/O echocardiogram 01/27/2020    EF 22-01, Grade I diastolic dysfunction.     History of exercise stress test 01/27/2020    Treadmill, normal stress test, THR achieved    History of nuclear stress test 08/23/2021    EF 55%, ETT negative for ischemia/arrhythmia, Medium sized area of moderate inferior wall ischemia    Hyperlipidemia     Hypertension     Obesity 02/15/2000    Psoriasis 03/09/2004    Sepsis ([facility] [address]     cystoscope       Past Surgical History:        Procedure Laterality Date    BLADDER SURGERY      remove lesion 2019 and 2020    CHOLECYSTECTOMY  1999    COLONOSCOPY  10/22/2003    polyp x1    COLONOSCOPY  2010    Normal exam    COLONOSCOPY  12/05/2017    single 2mm polyp found in proximal ascending colon, diverticulosis found in sigmoid colon    CORONARY ARTERY BYPASS GRAFT N/A 09/10/2021    CABG CORONARY ARTERY BYPASS performed by Ira Hernandez MD at [address] Right 2013    pilonidal    FRACTURE SURGERY Right 1969    tibia    FRACTURE SURGERY Left 1994    shoulder    FRACTURE SURGERY  2011    Nose & facial bones   [address]    [facility]       Social History:    Social History     Tobacco Use    Smoking status: Former     Types: Cigars    Smokeless tobacco: Never    Tobacco comments:     also former cigarette smoker. 8/30/2021   Substance Use Topics    Alcohol use: Yes     Comment: occas                                Counseling given: Not Answered  Tobacco comments: also former cigarette smoker. 8/30/2021      Vital Signs (Current):   Vitals:    02/21/23 1350   Weight: 189 lb (85.7 kg)   Height: 5' 9\" (1.753 m)                                              BP Readings from Last 3 Encounters:   02/20/23 90/60   11/14/22 129/60   09/07/22 (!) 191/90       NPO Status:                                                                                 BMI:   Wt Readings from Last 3 Encounters:   02/20/23 189 lb (85.7 kg)   11/14/22 189 lb 6 oz (85.9 kg)   09/07/22 185 lb (83.9 kg)     Body mass index is 27.91 kg/m². CBC:   Lab Results   Component Value Date/Time    WBC 6.1 11/13/2022 09:20 PM    RBC 5.01 11/13/2022 09:20 PM    HGB 15.0 11/13/2022 09:20 PM    HCT 45.1 11/13/2022 09:20 PM    MCV 90.0 11/13/2022 09:20 PM    RDW 12.5 11/13/2022 09:20 PM     11/13/2022 09:20 PM       CMP:   Lab Results   Component Value Date/Time     11/13/2022 09:20 PM    K 3.8 11/13/2022 09:20 PM     11/13/2022 09:20 PM    CO2 23 11/13/2022 09:20 PM    BUN 17 11/13/2022 09:20 PM    CREATININE 0.8 11/13/2022 09:20 PM    GFRAA >60 09/14/2021 05:30 AM    LABGLOM >60 11/13/2022 09:20 PM    GLUCOSE 213 11/13/2022 09:20 PM    PROT 7.2 11/13/2022 09:20 PM    PROT 7.3 10/10/2011 05:02 PM    CALCIUM 9.4 11/13/2022 09:20 PM    BILITOT 0.3 11/13/2022 09:20 PM    ALKPHOS 71 11/13/2022 09:20 PM    AST 45 11/13/2022 09:20 PM    ALT 40 11/13/2022 09:20 PM       POC Tests: No results for input(s): POCGLU, POCNA, POCK, POCCL, POCBUN, POCHEMO, POCHCT in the last 72 hours.     Coags:   Lab Results   Component Value Date/Time    PROTIME 14.5 09/10/2021 11:50 AM    INR 1.12 09/10/2021 11:50 AM    APTT 27.0 09/10/2021 11:50 AM       HCG (If Applicable): No results found for: PREGTESTUR, PREGSERUM, HCG, HCGQUANT     ABGs:   Lab Results   Component Value Date/Time    PO2ART 73.3 09/10/2021 08:22 PM    AGZ9CMT 36.6 09/10/2021 08:22 PM    OPO4PXO 21.3 09/10/2021 08:22 PM        Type & Screen (If Applicable):  No results found for: LABABO, LABRH    Drug/Infectious Status (If Applicable):  No results found for: HIV, HEPCAB    COVID-19 Screening (If Applicable):   Lab Results   Component Value Date/Time    COVID19 NOT DETECTED 09/09/2021 09:25 AM    COVID19 NOT DETECTED 09/02/2021 02:00 PM           Anesthesia Evaluation  Patient summary reviewed no history of anesthetic complications:   Airway: Mallampati: II  TM distance: >3 FB   Neck ROM: full  Mouth opening: > = 3 FB   Dental:    (+) poor dentition      Pulmonary:normal exam    (+) sleep apnea:                             Cardiovascular:  Exercise tolerance: good (>4 METS),   (+) hypertension:, CAD:, CABG/stent:, hyperlipidemia         Beta Blocker:  Dose within 24 Hrs      ROS comment: Echo 11/2022:   Summary   Left ventricular systolic function is normal.   Ejection fraction is visually estimated at 50-55%.   Grade I diastolic dysfunction.   Moderately dilated left atrium.   Mildly dilated right ventricle.   Sclerotic, but non-stenotic aortic valve.   No evidence of any pericardial effusion.    Stress test 9/2021:  Summary    Overall Impression:    Reduced exercise performance without angina and ischemic EKG changes.    for rehab        Neuro/Psych:   Negative Neuro/Psych ROS              GI/Hepatic/Renal:   (+) bowel prep,           Endo/Other:    (+) DiabetesType II DM, , malignancy/cancer.                  ROS comment: Cancer - Bladder  Abdominal:             Vascular: negative vascular ROS.         Other Findings:           Anesthesia Plan      MAC     ASA 3       Induction: intravenous.      Anesthetic plan and risks discussed with patient.      Plan discussed with  CRNA.                    RADHA Corrales - CRNA   2/24/2023     Pre Anesthesia Assessment complete. Chart reviewed on 2/24/2023

## 2023-02-24 NOTE — PROGRESS NOTES
Spoke with patient's wife Vicente Ng and he will arrive at 0730 at [facility] on 2/27/2023 for his procedure at 0900. IV order in epic, placed by Natalia JETT.

## 2023-02-25 NOTE — H&P
Original H &P in soft chart. I have examined the patient immediately before the procedure and there is no change in the previous history and physical exam, which has been reviewed. There is a history of sleep apnea. .  There has been no  previous adverse experience with sedation/anesthesia. There is no increased risk for aspiration of gastric contents. The patient has been instructed that all resuscitative measures (during the operative and immediate perioperative period) will be instituted in the unlikely event that they will be needed. The patient has no pertinent past surgical or family history other than listed in the original H&P. The patient was counseled about the risks of jai Covid-19 during their perioperative period and any recovery window from their procedure. The patient was made aware that jai Covid-19  may worsen their prognosis for recovering from their procedure  and lend to a higher morbidity and/or mortality risk. All material risks, benefits, and reasonable alternatives including postponing the procedure were discussed. The patient does wish to proceed with the procedure at this time.     ASA Class: 3  AIRWAY Class: 1

## 2023-02-27 ENCOUNTER — ANESTHESIA (OUTPATIENT)
Dept: ENDOSCOPY | Age: 78
End: 2023-02-27
Payer: MEDICARE

## 2023-02-27 ENCOUNTER — HOSPITAL ENCOUNTER (OUTPATIENT)
Age: 78
Setting detail: OUTPATIENT SURGERY
Discharge: HOME | End: 2023-02-27
Attending: SPECIALIST | Admitting: SPECIALIST
Payer: MEDICARE

## 2023-02-27 VITALS
DIASTOLIC BLOOD PRESSURE: 94 MMHG | HEART RATE: 60 BPM | BODY MASS INDEX: 27.99 KG/M2 | OXYGEN SATURATION: 97 % | SYSTOLIC BLOOD PRESSURE: 175 MMHG | WEIGHT: 189 LBS | RESPIRATION RATE: 18 BRPM | HEIGHT: 69 IN | TEMPERATURE: 97 F

## 2023-02-27 DIAGNOSIS — Z80.0 FAMILY HISTORY OF COLON CANCER: ICD-10-CM

## 2023-02-27 DIAGNOSIS — K74.69 OTHER CIRRHOSIS OF LIVER (HCC): Primary | ICD-10-CM

## 2023-02-27 DIAGNOSIS — Z86.010 HISTORY OF COLON POLYPS: ICD-10-CM

## 2023-02-27 PROBLEM — Z86.0100 HISTORY OF COLON POLYPS: Status: ACTIVE | Noted: 2023-02-27

## 2023-02-27 PROBLEM — D12.3 BENIGN NEOPLASM OF TRANSVERSE COLON: Status: ACTIVE | Noted: 2023-02-27

## 2023-02-27 PROBLEM — D12.0 BENIGN NEOPLASM OF CECUM: Status: ACTIVE | Noted: 2023-02-27

## 2023-02-27 PROBLEM — D12.7 BENIGN NEOPLASM OF RECTOSIGMOID JUNCTION: Status: ACTIVE | Noted: 2023-02-27

## 2023-02-27 PROBLEM — K64.9 RECTAL VARICES: Status: ACTIVE | Noted: 2023-02-27

## 2023-02-27 PROBLEM — D12.5 BENIGN NEOPLASM OF SIGMOID COLON: Status: ACTIVE | Noted: 2023-02-27

## 2023-02-27 LAB
ALBUMIN SERPL-MCNC: 4.2 GM/DL (ref 3.4–5)
ALP BLD-CCNC: 71 IU/L (ref 40–129)
ALT SERPL-CCNC: 44 U/L (ref 10–40)
AST SERPL-CCNC: 56 IU/L (ref 15–37)
BILIRUB SERPL-MCNC: 0.5 MG/DL (ref 0–1)
BILIRUBIN DIRECT: 0.2 MG/DL (ref 0–0.3)
BILIRUBIN, INDIRECT: 0.3 MG/DL (ref 0–0.7)
FERRITIN: 145 NG/ML (ref 30–400)
GLUCOSE BLD-MCNC: 142 MG/DL (ref 70–99)
HBV SURFACE AB SERPL IA-ACNC: <3.5 M[IU]/ML
INR BLD: 1.08 INDEX
IRON: 156 UG/DL (ref 59–158)
PCT TRANSFERRIN: 53 % (ref 10–44)
PROTHROMBIN TIME: 13.9 SECONDS (ref 11.7–14.5)
TOTAL IRON BINDING CAPACITY: 292 UG/DL (ref 250–450)
TOTAL PROTEIN: 6.8 GM/DL (ref 6.4–8.2)
UNSATURATED IRON BINDING CAPACITY: 136 UG/DL (ref 110–370)

## 2023-02-27 PROCEDURE — 2500000003 HC RX 250 WO HCPCS: Performed by: NURSE ANESTHETIST, CERTIFIED REGISTERED

## 2023-02-27 PROCEDURE — 82728 ASSAY OF FERRITIN: CPT

## 2023-02-27 PROCEDURE — 85610 PROTHROMBIN TIME: CPT

## 2023-02-27 PROCEDURE — 6360000002 HC RX W HCPCS: Performed by: NURSE ANESTHETIST, CERTIFIED REGISTERED

## 2023-02-27 PROCEDURE — 86708 HEPATITIS A ANTIBODY: CPT

## 2023-02-27 PROCEDURE — 80076 HEPATIC FUNCTION PANEL: CPT

## 2023-02-27 PROCEDURE — 86256 FLUORESCENT ANTIBODY TITER: CPT

## 2023-02-27 PROCEDURE — 86706 HEP B SURFACE ANTIBODY: CPT

## 2023-02-27 PROCEDURE — 84155 ASSAY OF PROTEIN SERUM: CPT

## 2023-02-27 PROCEDURE — 86038 ANTINUCLEAR ANTIBODIES: CPT

## 2023-02-27 PROCEDURE — 7100000011 HC PHASE II RECOVERY - ADDTL 15 MIN: Performed by: SPECIALIST

## 2023-02-27 PROCEDURE — 86376 MICROSOMAL ANTIBODY EACH: CPT

## 2023-02-27 PROCEDURE — 88305 TISSUE EXAM BY PATHOLOGIST: CPT

## 2023-02-27 PROCEDURE — 82103 ALPHA-1-ANTITRYPSIN TOTAL: CPT

## 2023-02-27 PROCEDURE — 45385 COLONOSCOPY W/LESION REMOVAL: CPT | Performed by: SPECIALIST

## 2023-02-27 PROCEDURE — 3609010600 HC COLONOSCOPY POLYPECTOMY SNARE/COLD BIOPSY: Performed by: SPECIALIST

## 2023-02-27 PROCEDURE — 2709999900 HC NON-CHARGEABLE SUPPLY: Performed by: SPECIALIST

## 2023-02-27 PROCEDURE — 7100000010 HC PHASE II RECOVERY - FIRST 15 MIN: Performed by: SPECIALIST

## 2023-02-27 PROCEDURE — 84165 PROTEIN E-PHORESIS SERUM: CPT

## 2023-02-27 PROCEDURE — 3700000000 HC ANESTHESIA ATTENDED CARE: Performed by: SPECIALIST

## 2023-02-27 PROCEDURE — 3700000001 HC ADD 15 MINUTES (ANESTHESIA): Performed by: SPECIALIST

## 2023-02-27 PROCEDURE — 2580000003 HC RX 258: Performed by: NURSE ANESTHETIST, CERTIFIED REGISTERED

## 2023-02-27 PROCEDURE — 82962 GLUCOSE BLOOD TEST: CPT

## 2023-02-27 PROCEDURE — 83550 IRON BINDING TEST: CPT

## 2023-02-27 PROCEDURE — 2580000003 HC RX 258: Performed by: SPECIALIST

## 2023-02-27 PROCEDURE — 83540 ASSAY OF IRON: CPT

## 2023-02-27 RX ORDER — SODIUM CHLORIDE, SODIUM LACTATE, POTASSIUM CHLORIDE, CALCIUM CHLORIDE 600; 310; 30; 20 MG/100ML; MG/100ML; MG/100ML; MG/100ML
INJECTION, SOLUTION INTRAVENOUS CONTINUOUS
Status: DISCONTINUED | OUTPATIENT
Start: 2023-02-27 | End: 2023-02-27 | Stop reason: HOSPADM

## 2023-02-27 RX ORDER — PROPOFOL 10 MG/ML
INJECTION, EMULSION INTRAVENOUS PRN
Status: DISCONTINUED | OUTPATIENT
Start: 2023-02-27 | End: 2023-02-27 | Stop reason: SDUPTHER

## 2023-02-27 RX ORDER — SODIUM CHLORIDE, SODIUM LACTATE, POTASSIUM CHLORIDE, CALCIUM CHLORIDE 600; 310; 30; 20 MG/100ML; MG/100ML; MG/100ML; MG/100ML
INJECTION, SOLUTION INTRAVENOUS CONTINUOUS PRN
Status: DISCONTINUED | OUTPATIENT
Start: 2023-02-27 | End: 2023-02-27 | Stop reason: SDUPTHER

## 2023-02-27 RX ORDER — LIDOCAINE HYDROCHLORIDE 20 MG/ML
INJECTION, SOLUTION EPIDURAL; INFILTRATION; INTRACAUDAL; PERINEURAL PRN
Status: DISCONTINUED | OUTPATIENT
Start: 2023-02-27 | End: 2023-02-27 | Stop reason: SDUPTHER

## 2023-02-27 RX ADMIN — PROPOFOL 50 MG: 10 INJECTION, EMULSION INTRAVENOUS at 09:03

## 2023-02-27 RX ADMIN — LIDOCAINE HYDROCHLORIDE 50 MG: 20 INJECTION, SOLUTION EPIDURAL; INFILTRATION; INTRACAUDAL; PERINEURAL at 08:42

## 2023-02-27 RX ADMIN — PROPOFOL 50 MG: 10 INJECTION, EMULSION INTRAVENOUS at 08:47

## 2023-02-27 RX ADMIN — PROPOFOL 50 MG: 10 INJECTION, EMULSION INTRAVENOUS at 08:52

## 2023-02-27 RX ADMIN — SODIUM CHLORIDE, POTASSIUM CHLORIDE, SODIUM LACTATE AND CALCIUM CHLORIDE: 600; 310; 30; 20 INJECTION, SOLUTION INTRAVENOUS at 08:37

## 2023-02-27 RX ADMIN — SODIUM CHLORIDE, POTASSIUM CHLORIDE, SODIUM LACTATE AND CALCIUM CHLORIDE: 600; 310; 30; 20 INJECTION, SOLUTION INTRAVENOUS at 08:30

## 2023-02-27 RX ADMIN — PROPOFOL 50 MG: 10 INJECTION, EMULSION INTRAVENOUS at 08:57

## 2023-02-27 RX ADMIN — PROPOFOL 20 MG: 10 INJECTION, EMULSION INTRAVENOUS at 09:10

## 2023-02-27 RX ADMIN — PROPOFOL 50 MG: 10 INJECTION, EMULSION INTRAVENOUS at 08:42

## 2023-02-27 ASSESSMENT — PAIN SCALES - GENERAL
PAINLEVEL_OUTOF10: 0
PAINLEVEL_OUTOF10: 0

## 2023-02-27 ASSESSMENT — PAIN - FUNCTIONAL ASSESSMENT: PAIN_FUNCTIONAL_ASSESSMENT: 0-10

## 2023-02-27 NOTE — PROGRESS NOTES
4464- Pt returned to SDS rm 13, respirations even and unlabored, VSS, pt alert and oriented. Report at bedside w/ Brittney Amado. Family at bedside. 0930- Crackers and beverage provided to pt, tolerating well   0940- Dr. Lindsey Medel at bedside   21 - Lab work drawn and sent to lab via tube station.  Discharge instructions reviewed w/ pt and pt wife at bedside, verbalized understanding   1018- Pt dressing and assisted to restroom   1026- Pt discharged via car w/ pt wife driving

## 2023-02-27 NOTE — PROGRESS NOTES
Patient is back to baseline. Report given to [facility]. Family at bedside. Wheels locked, side rails up.  Bed in lowest position

## 2023-02-27 NOTE — ANESTHESIA POSTPROCEDURE EVALUATION
Department of Anesthesiology  Postprocedure Note    Patient: Sowmya Heart  MRN: [id number]  YOB: 1945  Date of evaluation: 2/27/2023      Procedure Summary     Date: 02/27/23 Room / Location: [address] [place] [address]    Anesthesia Start: 1167 Anesthesia Stop: 1371    Procedure: COLONOSCOPY POLYPECTOMY SNARE/COLD BIOPSY Diagnosis:       History of colon polyps      Family history of colon cancer      (History of colon polyps [Z86.010])      (Family history of colon cancer [Z80.0])    Surgeons: Saurav Rudolph MD Responsible Provider: Abida Prather MD    Anesthesia Type: MAC ASA Status: 3          Anesthesia Type: No value filed.     [place] Phase I: [place] Score: 10    [place] Phase II:        Anesthesia Post Evaluation    Patient location during evaluation: bedside  Patient participation: complete - patient participated  Level of consciousness: sleepy but conscious  Pain score: 0  Airway patency: patent  Nausea & Vomiting: no nausea and no vomiting  Complications: no  Cardiovascular status: blood pressure returned to baseline and hemodynamically stable  Respiratory status: acceptable and spontaneous ventilation  Hydration status: stable

## 2023-02-27 NOTE — DISCHARGE INSTRUCTIONS
COLONOSCOPY    DR. Neal Speak    OFFICE NUMBER [phone]    FOLLOW UP APPOINTMENT IN 2 WEEKS OR AS NEEDED. REPEAT PROCEDURE IN 3 YEARS OR AS NEEDED. TEST ORDERED: DETERMINED BY PATHOLOGY. What to expect at home: Your Recovery   Your doctor will tell you when you can eat and do your other usual activities Your doctor will talk to you about when you will need your next colonoscopy. Your doctor can help you decide how often you need to be checked. This will depend on the results of your test and your risk for colorectal cancer. After the test, you may be bloated or have gas pains. You may need to pass gas. If a biopsy was done or a polyp was removed, you may have streaks of blood in your stool (feces) for a few days. This care sheet gives you a general idea about how long it will take for you to recover. But each person recovers at a different pace. Follow the steps below to get better as quickly as possible. How can you care for yourself at home? Activity  Rest when you feel tired. Diet  Follow your doctor's directions for eating. Unless your doctor has told you not to, drink plenty of fluids. This helps to replace the fluids that were lost during the colon prep. DO NOT DRINK ALCOHOL. Medicines  Your doctor will tell you if and when you can restart your medicines. He or she will also give you instructions about taking any new medicines. If you take blood thinners, such as warfarin (Coumadin), clopidogrel (Plavix), or aspirin, be sure to talk to your doctor. He or she will tell you if and when to start taking those medicines again. Make sure that you understand exactly what your doctor wants you to do. If polyps were removed or a biopsy was done during the test, your doctor may tell you not to take aspirin or other anti-inflammatory medicines for a few days. These include ibuprofen (Advil, Motrin) and naproxen (Aleve).   Other instructions: Anethesia  For your safety, do not drive or operate machinery for 24 hours. Do not sign legal documents or make major decisions for 24 hours. The anesthesia can make it hard for you to fully understand what you are agreeing to. Follow-up care is a key part of your treatment and safety. Be sure to make and go to all appointments, and call your doctor if you are having problems. It's also a good idea to know your test results and keep a list of the medicines you take. When should you call for help? [address] [facility] Stephanie Smith Dennys Gonzalez [phone]  Call 911 anytime you think you may need emergency care. For example, call if:  You passed out (lost consciousness). You pass maroon or bloody stools. You have severe belly pain. Call your doctor now or seek immediate medical care if:  Your stools are black and tarlike. Your stools have streaks of blood, but you did not have a biopsy or any polyps removed. You have belly pain, or your belly is swollen and firm. You vomit. You have a fever. You are very dizzy. Watch closely for changes in your health, and be sure to contact your doctor if you have any problems. Where can you learn more? Go to https://City Be[employer]aw Networkseb.eVoter. org and sign in to your adQuota account. Enter E264 in the Advantagene box to learn more about Colonoscopy: What to Expect at Home.     If you do not have an account, please click on the Sign Up Now link. © 6305-6851 Healthwise, Incorporated. Care instructions adapted under license by [facility] ([facility]). This care instruction is for use with your licensed healthcare professional. If you have questions about a medical condition or this instruction, always ask your healthcare professional. [place] [address] any warranty or liability for your use of this information.   Content Version: 54.3.268397; Current as of: November 20, 2015             [place]    Do not drive, work around [facility] [address] or use equipment. Do not drink any alcoholic beverages. Do not smoke while alone. Avoid making important decisions. Plan to spend a quiet, relaxed evening @ home. Resume normal activities as you begin to feel better. Eat lightly for your first meal, then gradually increase your diet to what is normal for you. In case of nausea, avoid food and drink only clear liquids. Resume food as nausea ceases. Notify your surgeon if you experience fever, chills, large amount of bleeding, difficulty breathing, persistent nausea and vomiting or any other disturbing problem. Call for a follow-up appointment with your surgeon. Advance Care Planning  People with COVID-19 may have no symptoms, mild symptoms, such as fever, cough, and shortness of breath or they may have more severe illness, developing severe and fatal pneumonia. As a result, Advance Care Planning with attention to naming a health care decision maker (someone you trust to make healthcare decisions for you if you could not speak for yourself) and sharing other health care preferences is important BEFORE a possible health crisis. Please contact your Primary Care Provider to discuss Advance Care Planning. Preventing the Spread of Coronavirus Disease 2019 in Homes and Residential Communities  For the most recent information go to RetailCleaners.fi    Prevention steps for People with confirmed or suspected COVID-19 (including persons under investigation) who do not need to be hospitalized  and   People with confirmed COVID-19 who were hospitalized and determined to be medically stable to go home    Your healthcare provider and public health staff will evaluate whether you can be cared for at home. If it is determined that you do not need to be hospitalized and can be isolated at home, you will be monitored by staff from your local or state health department.  You should follow the prevention steps below until a healthcare provider or local or [facility] health department says you can return to your normal activities. Stay home except to get medical care  People who are mildly ill with COVID-19 are able to isolate at home during their illness. You should restrict activities outside your home, except for getting medical care. Do not go to work, school, or public areas. Avoid using public transportation, ride-sharing, or taxis. Separate yourself from other people and animals in your home  People: As much as possible, you should stay in a specific room and away from other people in your home. Also, you should use a separate bathroom, if available. Animals: You should restrict contact with pets and other animals while you are sick with COVID-19, just like you would around other people. Although there have not been reports of pets or other animals becoming sick with COVID-19, it is still recommended that people sick with COVID-19 limit contact with animals until more information is known about the virus. When possible, have another member of your household care for your animals while you are sick. If you are sick with COVID-19, avoid contact with your pet, including petting, snuggling, being kissed or licked, and sharing food. If you must care for your pet or be around animals while you are sick, wash your hands before and after you interact with pets and wear a facemask. Call ahead before visiting your doctor  If you have a medical appointment, call the healthcare provider and tell them that you have or may have COVID-19. This will help the healthcare providers office take steps to keep other people from getting infected or exposed. Wear a facemask  You should wear a facemask when you are around other people (e.g., sharing a room or vehicle) or pets and before you enter a healthcare providers office.  If you are not able to wear a facemask (for example, because it causes trouble breathing), then people who live with you should not stay in the same room with you, or they should wear a facemask if they enter your room. Cover your coughs and sneezes  Cover your mouth and nose with a tissue when you cough or sneeze. Throw used tissues in a lined trash can. Immediately wash your hands with soap and water for at least 20 seconds or, if soap and water are not available, clean your hands with an alcohol-based hand  that contains at least 60% alcohol. Clean your hands often  Wash your hands often with soap and water for at least 20 seconds, especially after blowing your nose, coughing, or sneezing; going to the bathroom; and before eating or preparing food. If soap and water are not readily available, use an alcohol-based hand  with at least 60% alcohol, covering all surfaces of your hands and rubbing them together until they feel dry. Soap and water are the best option if hands are visibly dirty. Avoid touching your eyes, nose, and mouth with unwashed hands. Avoid sharing personal household items  You should not share dishes, drinking glasses, cups, eating utensils, towels, or bedding with other people or pets in your home. After using these items, they should be washed thoroughly with soap and water. Clean all high-touch surfaces everyday  High touch surfaces include counters, tabletops, doorknobs, bathroom fixtures, toilets, phones, keyboards, tablets, and bedside tables. Also, clean any surfaces that may have blood, stool, or body fluids on them. Use a household cleaning spray or wipe, according to the label instructions. Labels contain instructions for safe and effective use of the cleaning product including precautions you should take when applying the product, such as wearing gloves and making sure you have good ventilation during use of the product. Monitor your symptoms  Seek prompt medical attention if your illness is worsening (e.g., difficulty breathing).  Before seeking care, call your healthcare provider and tell them that you have, or are being evaluated for, COVID-19. Put on a facemask before you enter the facility. These steps will help the healthcare providers office to keep other people in the office or waiting room from getting infected or exposed. Ask your healthcare provider to call the local or state health department. Persons who are placed under active monitoring or facilitated self-monitoring should follow instructions provided by their local health department or occupational health professionals, as appropriate. When working with your local health department check their available hours. If you have a medical emergency and need to call 911, notify the dispatch personnel that you have, or are being evaluated for COVID-19. If possible, put on a facemask before emergency medical services arrive. Discontinuing home isolation  Patients with confirmed COVID-19 should remain under home isolation precautions until the risk of secondary transmission to others is thought to be low. The decision to discontinue home isolation precautions should be made on a case-by-case basis, in consultation with healthcare providers and state and local health departments.

## 2023-02-28 LAB — A1AT SERPL-MCNC: 100 MG/DL (ref 90–200)

## 2023-03-01 LAB
ALBUMIN SERPL ELPH-MCNC: 3.5 GM/DL (ref 3.2–5.6)
ALPHA-1-GLOBULIN: 0.2 GM/DL (ref 0.1–0.4)
ALPHA-2-GLOBULIN: 0.8 GM/DL (ref 0.4–1.2)
BETA GLOBULIN: 1 GM/DL (ref 0.5–1.3)
GAMMA GLOBULIN: 1.3 GM/DL (ref 0.5–1.6)
HAV AB SER QL IA: NEGATIVE
Lab: NORMAL
MITOCHONDRIA M2 IGG SER-ACNC: 2.9 UNITS (ref 0–24.9)
NUCLEAR IGG SER QL IA: NORMAL
SPEP INTERPRETATION: NORMAL
TEST NAME: NORMAL
TOTAL PROTEIN: 6.8 GM/DL (ref 6.4–8.2)

## 2023-03-02 LAB
Lab: NORMAL
TEST NAME: NORMAL

## 2023-03-09 ENCOUNTER — HOSPITAL ENCOUNTER (OUTPATIENT)
Dept: ULTRASOUND IMAGING | Age: 78
Discharge: HOME | End: 2023-03-09
Payer: MEDICARE

## 2023-03-09 DIAGNOSIS — K74.69 OTHER CIRRHOSIS OF LIVER (HCC): ICD-10-CM

## 2023-03-09 PROCEDURE — 76705 ECHO EXAM OF ABDOMEN: CPT

## 2023-04-07 ENCOUNTER — OFFICE VISIT (OUTPATIENT)
Dept: GASTROENTEROLOGY | Age: 78
End: 2023-04-07
Payer: MEDICARE

## 2023-04-07 ENCOUNTER — HOSPITAL ENCOUNTER (OUTPATIENT)
Age: 78
Discharge: HOME | End: 2023-04-07
Payer: MEDICARE

## 2023-04-07 VITALS
HEART RATE: 75 BPM | DIASTOLIC BLOOD PRESSURE: 80 MMHG | TEMPERATURE: 97.7 F | HEIGHT: 69 IN | OXYGEN SATURATION: 98 % | SYSTOLIC BLOOD PRESSURE: 142 MMHG | BODY MASS INDEX: 27.73 KG/M2 | WEIGHT: 187.2 LBS

## 2023-04-07 DIAGNOSIS — K74.60 CIRRHOSIS OF LIVER WITHOUT ASCITES, UNSPECIFIED HEPATIC CIRRHOSIS TYPE (HCC): Primary | ICD-10-CM

## 2023-04-07 DIAGNOSIS — K74.60 CIRRHOSIS OF LIVER WITHOUT ASCITES, UNSPECIFIED HEPATIC CIRRHOSIS TYPE (HCC): ICD-10-CM

## 2023-04-07 LAB
HCT VFR BLD CALC: 37.8 % (ref 42–52)
HEMOGLOBIN: 12.4 GM/DL (ref 13.5–18)
MCH RBC QN AUTO: 30.3 PG (ref 27–31)
MCHC RBC AUTO-ENTMCNC: 32.8 % (ref 32–36)
MCV RBC AUTO: 92.4 FL (ref 78–100)
PDW BLD-RTO: 13 % (ref 11.7–14.9)
PLATELET # BLD: 129 K/CU MM (ref 140–440)
PMV BLD AUTO: 12.7 FL (ref 7.5–11.1)
RBC # BLD: 4.09 M/CU MM (ref 4.6–6.2)
WBC # BLD: 5.1 K/CU MM (ref 4–10.5)

## 2023-04-07 PROCEDURE — 3079F DIAST BP 80-89 MM HG: CPT | Performed by: SPECIALIST

## 2023-04-07 PROCEDURE — G8417 CALC BMI ABV UP PARAM F/U: HCPCS | Performed by: SPECIALIST

## 2023-04-07 PROCEDURE — 85027 COMPLETE CBC AUTOMATED: CPT

## 2023-04-07 PROCEDURE — G8427 DOCREV CUR MEDS BY ELIG CLIN: HCPCS | Performed by: SPECIALIST

## 2023-04-07 PROCEDURE — 36415 COLL VENOUS BLD VENIPUNCTURE: CPT

## 2023-04-07 PROCEDURE — 1123F ACP DISCUSS/DSCN MKR DOCD: CPT | Performed by: SPECIALIST

## 2023-04-07 PROCEDURE — 1036F TOBACCO NON-USER: CPT | Performed by: SPECIALIST

## 2023-04-07 PROCEDURE — 82105 ALPHA-FETOPROTEIN SERUM: CPT

## 2023-04-07 PROCEDURE — 99213 OFFICE O/P EST LOW 20 MIN: CPT | Performed by: SPECIALIST

## 2023-04-07 PROCEDURE — 3077F SYST BP >= 140 MM HG: CPT | Performed by: SPECIALIST

## 2023-04-07 RX ORDER — OMEPRAZOLE 20 MG/1
20 CAPSULE, DELAYED RELEASE ORAL DAILY
COMMUNITY

## 2023-04-07 NOTE — PROGRESS NOTES
CC: Follow up for cirrhosis    SUBJECTIVE:  No problems- denies abdominal pain,nausea,vomiting, diarrhea, constipation,melena, hematochezia, or hematemesis.  Denies abdominal swelling, peripheral edema, periods of confusion    Last AFP:      not done  Last liver imaging: splenomegaly- coarsened liver     Last EGD for varices: not done  Hepatitis A immunity: No   Hepatitis B immunity: No  .alfts     ROS: non-contributory    OBJECTIVE:   PHYSICAL EXAM:    Vitals:  BP (!) 142/80 (Site: Left Upper Arm, Position: Sitting, Cuff Size: Medium Adult)   Pulse 75   Temp 97.7 °F (36.5 °C) (Infrared)   Ht 5' 9\" (1.753 m)   Wt 187 lb 3.2 oz (84.9 kg)   SpO2 98%   BMI 27.64 kg/m²   CONSTITUTIONAL: alert  EYES:  pupils equal, round and reactive to light and sclera clear  LUNGS:  clear to auscultation  CARDIOVASCULAR:  regular rate and rhythm and no murmur noted  ABDOMEN:  normal bowel sounds, soft, non-distended, non-tender and no masses palpated, no hepatosplenomegally  NEUROLOGIC: no asterixis or focal deficit detected   EXTREMITIES: no clubbing, cyanosis, or edema    ASSESSMENT:    1) cirrhosis-prob GARCÍA    PLAN:   1) CBC, AFP   2) Hep A and B vaccines   3) Fibroscan- if consistent with cirrhosis will need EGD   4) return visit 3 months

## 2023-04-09 LAB — AFP-TM SERPL-MCNC: 6 NG/ML (ref 0–9)

## 2023-04-17 ENCOUNTER — HOSPITAL ENCOUNTER (OUTPATIENT)
Dept: OPERATING ROOM | Age: 78
Setting detail: OUTPATIENT SURGERY
Discharge: HOME | End: 2023-04-17

## 2023-04-17 ENCOUNTER — HOSPITAL ENCOUNTER (OUTPATIENT)
Age: 78
Discharge: HOME | End: 2023-04-17
Payer: MEDICARE

## 2023-04-17 ENCOUNTER — HOSPITAL ENCOUNTER (OUTPATIENT)
Dept: SURGERY | Age: 78
Setting detail: OUTPATIENT SURGERY
Discharge: HOME | End: 2023-04-17

## 2023-04-17 PROCEDURE — 91200 LIVER ELASTOGRAPHY: CPT

## 2023-04-21 ENCOUNTER — HOSPITAL ENCOUNTER (OUTPATIENT)
Age: 78
Discharge: HOME | End: 2023-04-21
Payer: MEDICARE

## 2023-04-21 LAB
CREAT SERPL-MCNC: 0.8 MG/DL (ref 0.9–1.3)
GFR SERPL CREATININE-BSD FRML MDRD: >60 ML/MIN/1.73M2

## 2023-04-21 PROCEDURE — 36415 COLL VENOUS BLD VENIPUNCTURE: CPT

## 2023-04-21 PROCEDURE — 82565 ASSAY OF CREATININE: CPT

## 2023-06-01 ENCOUNTER — PROCEDURE VISIT (OUTPATIENT)
Dept: CARDIOLOGY CLINIC | Age: 78
End: 2023-06-01

## 2023-06-01 ENCOUNTER — OFFICE VISIT (OUTPATIENT)
Dept: CARDIOLOGY CLINIC | Age: 78
End: 2023-06-01
Payer: MEDICARE

## 2023-06-01 VITALS
OXYGEN SATURATION: 98 % | HEIGHT: 68 IN | BODY MASS INDEX: 28.46 KG/M2 | DIASTOLIC BLOOD PRESSURE: 60 MMHG | SYSTOLIC BLOOD PRESSURE: 114 MMHG | HEART RATE: 60 BPM

## 2023-06-01 DIAGNOSIS — I82.A11 ACUTE DEEP VEIN THROMBOSIS (DVT) OF AXILLARY VEIN OF RIGHT UPPER EXTREMITY (HCC): ICD-10-CM

## 2023-06-01 DIAGNOSIS — M79.89 RIGHT LEG SWELLING: ICD-10-CM

## 2023-06-01 PROCEDURE — 3078F DIAST BP <80 MM HG: CPT | Performed by: NURSE PRACTITIONER

## 2023-06-01 PROCEDURE — 1036F TOBACCO NON-USER: CPT | Performed by: NURSE PRACTITIONER

## 2023-06-01 PROCEDURE — 99214 OFFICE O/P EST MOD 30 MIN: CPT | Performed by: NURSE PRACTITIONER

## 2023-06-01 PROCEDURE — 1123F ACP DISCUSS/DSCN MKR DOCD: CPT | Performed by: NURSE PRACTITIONER

## 2023-06-01 PROCEDURE — 3074F SYST BP LT 130 MM HG: CPT | Performed by: NURSE PRACTITIONER

## 2023-06-01 PROCEDURE — G8417 CALC BMI ABV UP PARAM F/U: HCPCS | Performed by: NURSE PRACTITIONER

## 2023-06-01 PROCEDURE — G8427 DOCREV CUR MEDS BY ELIG CLIN: HCPCS | Performed by: NURSE PRACTITIONER

## 2023-06-01 ASSESSMENT — ENCOUNTER SYMPTOMS
SHORTNESS OF BREATH: 0
ORTHOPNEA: 0

## 2023-06-01 NOTE — PROGRESS NOTES
Palpations: Abdomen is soft. Tenderness: There is no abdominal tenderness. Musculoskeletal:         General: Normal range of motion. Cervical back: No tenderness. Right lower leg: No edema. Left lower leg: No edema. Skin:     General: Skin is warm and dry. Capillary Refill: Capillary refill takes less than 2 seconds. Neurological:      Mental Status: He is alert and oriented to person, place, and time. Psychiatric:         Mood and Affect: Mood normal.         Behavior: Behavior normal.              Current Outpatient Medications   Medication Sig Dispense Refill    omeprazole (PRILOSEC) 20 MG delayed release capsule Take 1 capsule by mouth daily      CINNAMON PO Take by mouth      aspirin 81 MG EC tablet Take 1 tablet by mouth daily      EPINEPHrine (EPIPEN 2-KHARI) 0.3 MG/0.3ML SOAJ injection Inject 0.3 mLs into the muscle once for 1 dose Use as directed for severe allergic reaction 0.3 mL 0    finasteride (PROSCAR) 5 MG tablet Take 1 tablet by mouth daily      tadalafil (CIALIS) 5 MG tablet TAKE 1 TABLET BY MOUTH DAILY. metoprolol tartrate (LOPRESSOR) 25 MG tablet Take 0.5 tablets by mouth 2 times daily 180 tablet 3    metFORMIN (GLUCOPHAGE) 1000 MG tablet Take 1 tablet by mouth 2 times daily (with meals) (Patient taking differently: Take 0.5 tablets by mouth 2 times daily (with meals)) 60 tablet 3    pravastatin (PRAVACHOL) 40 MG tablet 1 tablet daily      JANUVIA 100 MG tablet 1 tablet daily      Coenzyme Q10 (CO Q 10 PO) Take by mouth daily      Multiple Vitamins-Minerals (OCUVITE ADULT 50+ PO) Take by mouth daily      glimepiride (AMARYL) 4 MG tablet Take 1 tablet by mouth every morning (before breakfast)      Multiple Vitamins-Minerals (CENTRUM SILVER 50+MEN PO) Take by mouth       No current facility-administered medications for this visit.           All pertinent data reviewed and discussed with patient       ASSESSMENT/PLAN:      Acute DVT  His ultrasound today reveals

## 2023-06-19 ENCOUNTER — TELEPHONE (OUTPATIENT)
Dept: CARDIOLOGY CLINIC | Age: 78
End: 2023-06-19

## 2023-06-21 ENCOUNTER — OFFICE VISIT (OUTPATIENT)
Dept: CARDIOLOGY CLINIC | Age: 78
End: 2023-06-21
Payer: MEDICARE

## 2023-06-21 VITALS
WEIGHT: 189.8 LBS | OXYGEN SATURATION: 97 % | SYSTOLIC BLOOD PRESSURE: 128 MMHG | BODY MASS INDEX: 28.76 KG/M2 | HEIGHT: 68 IN | DIASTOLIC BLOOD PRESSURE: 64 MMHG | HEART RATE: 65 BPM

## 2023-06-21 DIAGNOSIS — I82.5Z1 LOWER LEG DVT (DEEP VENOUS THROMBOEMBOLISM), CHRONIC, RIGHT (HCC): ICD-10-CM

## 2023-06-21 DIAGNOSIS — I25.10 CAD IN NATIVE ARTERY: ICD-10-CM

## 2023-06-21 DIAGNOSIS — D64.9 ANEMIA, UNSPECIFIED TYPE: Primary | ICD-10-CM

## 2023-06-21 PROCEDURE — 99214 OFFICE O/P EST MOD 30 MIN: CPT | Performed by: NURSE PRACTITIONER

## 2023-06-21 PROCEDURE — 1123F ACP DISCUSS/DSCN MKR DOCD: CPT | Performed by: NURSE PRACTITIONER

## 2023-06-21 PROCEDURE — G8417 CALC BMI ABV UP PARAM F/U: HCPCS | Performed by: NURSE PRACTITIONER

## 2023-06-21 PROCEDURE — G8427 DOCREV CUR MEDS BY ELIG CLIN: HCPCS | Performed by: NURSE PRACTITIONER

## 2023-06-21 PROCEDURE — 3074F SYST BP LT 130 MM HG: CPT | Performed by: NURSE PRACTITIONER

## 2023-06-21 PROCEDURE — 1036F TOBACCO NON-USER: CPT | Performed by: NURSE PRACTITIONER

## 2023-06-21 PROCEDURE — 3078F DIAST BP <80 MM HG: CPT | Performed by: NURSE PRACTITIONER

## 2023-06-21 ASSESSMENT — ENCOUNTER SYMPTOMS
COUGH: 0
SHORTNESS OF BREATH: 0

## 2023-06-21 NOTE — PROGRESS NOTES
CARDIOLOGY  NOTE    2023    Selma Steve (:  1945) is a 70 y.o. [place]  established patient with Dr. Pipo Barron, here for evaluation of the following chief complaint(s):  1 Month Follow-Up (Pt denies other cardiac symptoms) and Edema (Right leg)        SUBJECTIVE/OBJECTIVE:    WILL Holbrook has Past medical history as noted below. He says he is better,bp  is improved not on midodrine any more, stopped flomax . His wife is concerned that he does not have enough energy but sleep apnea has mild , He was on cruise had leg swelling   Vandana Peters has  Coronary artery bypass grafting x4, left internal mammary artery,sequenced to diagonal branch artery and LAD. Reverse saphenous vein,graft anastomosed to obtuse marginal 1 artery, reverse saphenous vein,graft anastomosed to distal right coronary artery in 2021 . Diabetes is better after staring januvia   Due to ongoing palpitations he had Holter monitor in 2020 which did not show any significant arrhythmias. Blood pressures been running in 120's  at home  He is on nitro as needed but has not needed to use it so far         Review of Systems   Constitutional:  Negative for fatigue and fever. Respiratory:  Negative for cough and shortness of breath. Cardiovascular:  Negative for chest pain, palpitations and leg swelling. Musculoskeletal:  Negative for arthralgias and gait problem. Neurological:  Negative for dizziness, syncope, weakness, light-headedness and headaches.      Vitals:    23 1334   BP: 128/64   Site: Left Upper Arm   Position: Sitting   Cuff Size: Medium Adult   Pulse: 65   SpO2: 97%   Weight: 189 lb 12.8 oz (86.1 kg)   Height: 5' 8\" (1.727 m)       Wt Readings from Last 3 Encounters:   23 189 lb 12.8 oz (86.1 kg)   23 187 lb 3.2 oz (84.9 kg)   23 189 lb (85.7 kg)       BP Readings from Last 3 Encounters:   23 128/64   23 114/60   23 (!) 142/80       Prior to Admission medications

## 2023-06-27 ENCOUNTER — OFFICE VISIT (OUTPATIENT)
Dept: GASTROENTEROLOGY | Age: 78
End: 2023-06-27
Payer: MEDICARE

## 2023-06-27 VITALS
OXYGEN SATURATION: 97 % | TEMPERATURE: 97.4 F | WEIGHT: 191 LBS | BODY MASS INDEX: 28.95 KG/M2 | HEART RATE: 62 BPM | HEIGHT: 68 IN | DIASTOLIC BLOOD PRESSURE: 62 MMHG | SYSTOLIC BLOOD PRESSURE: 126 MMHG

## 2023-06-27 DIAGNOSIS — D12.6 TUBULAR ADENOMA OF COLON: ICD-10-CM

## 2023-06-27 DIAGNOSIS — K74.60 CIRRHOSIS OF LIVER WITHOUT ASCITES, UNSPECIFIED HEPATIC CIRRHOSIS TYPE (HCC): Primary | ICD-10-CM

## 2023-06-27 PROCEDURE — 3078F DIAST BP <80 MM HG: CPT | Performed by: INTERNAL MEDICINE

## 2023-06-27 PROCEDURE — 3074F SYST BP LT 130 MM HG: CPT | Performed by: INTERNAL MEDICINE

## 2023-06-27 PROCEDURE — G8417 CALC BMI ABV UP PARAM F/U: HCPCS | Performed by: INTERNAL MEDICINE

## 2023-06-27 PROCEDURE — 1036F TOBACCO NON-USER: CPT | Performed by: INTERNAL MEDICINE

## 2023-06-27 PROCEDURE — G8427 DOCREV CUR MEDS BY ELIG CLIN: HCPCS | Performed by: INTERNAL MEDICINE

## 2023-06-27 PROCEDURE — 99214 OFFICE O/P EST MOD 30 MIN: CPT | Performed by: INTERNAL MEDICINE

## 2023-06-27 PROCEDURE — 1123F ACP DISCUSS/DSCN MKR DOCD: CPT | Performed by: INTERNAL MEDICINE

## 2023-06-27 RX ORDER — METFORMIN HYDROCHLORIDE 500 MG/1
TABLET, EXTENDED RELEASE ORAL 2 TIMES DAILY
COMMUNITY
Start: 2023-05-08

## 2023-07-03 ENCOUNTER — HOSPITAL ENCOUNTER (OUTPATIENT)
Age: 78
Discharge: HOME | End: 2023-07-03
Payer: MEDICARE

## 2023-07-03 ENCOUNTER — ANESTHESIA EVENT (OUTPATIENT)
Dept: ENDOSCOPY | Age: 78
End: 2023-07-03
Payer: MEDICARE

## 2023-07-03 DIAGNOSIS — K74.69 OTHER CIRRHOSIS OF LIVER (HCC): ICD-10-CM

## 2023-07-03 LAB
ALBUMIN SERPL-MCNC: 4.2 GM/DL (ref 3.4–5)
ALP BLD-CCNC: 84 IU/L (ref 40–129)
ALT SERPL-CCNC: 37 U/L (ref 10–40)
AST SERPL-CCNC: 50 IU/L (ref 15–37)
BILIRUB SERPL-MCNC: 0.4 MG/DL (ref 0–1)
BILIRUBIN DIRECT: 0.2 MG/DL (ref 0–0.3)
BILIRUBIN, INDIRECT: 0.2 MG/DL (ref 0–0.7)
FERRITIN: 75 NG/ML (ref 30–400)
IRON: 105 UG/DL (ref 59–158)
PCT TRANSFERRIN: 31 % (ref 10–44)
TOTAL IRON BINDING CAPACITY: 336 UG/DL (ref 250–450)
TOTAL PROTEIN: 6.7 GM/DL (ref 6.4–8.2)
UNSATURATED IRON BINDING CAPACITY: 231 UG/DL (ref 110–370)

## 2023-07-03 PROCEDURE — 82103 ALPHA-1-ANTITRYPSIN TOTAL: CPT

## 2023-07-03 PROCEDURE — 86708 HEPATITIS A ANTIBODY: CPT

## 2023-07-03 PROCEDURE — 83516 IMMUNOASSAY NONANTIBODY: CPT

## 2023-07-03 PROCEDURE — 86256 FLUORESCENT ANTIBODY TITER: CPT

## 2023-07-03 PROCEDURE — 86706 HEP B SURFACE ANTIBODY: CPT

## 2023-07-03 PROCEDURE — 80076 HEPATIC FUNCTION PANEL: CPT

## 2023-07-03 PROCEDURE — 83540 ASSAY OF IRON: CPT

## 2023-07-03 PROCEDURE — 82105 ALPHA-FETOPROTEIN SERUM: CPT

## 2023-07-03 PROCEDURE — 82728 ASSAY OF FERRITIN: CPT

## 2023-07-03 PROCEDURE — 36415 COLL VENOUS BLD VENIPUNCTURE: CPT

## 2023-07-03 PROCEDURE — 83550 IRON BINDING TEST: CPT

## 2023-07-03 PROCEDURE — 86038 ANTINUCLEAR ANTIBODIES: CPT

## 2023-07-03 PROCEDURE — 84155 ASSAY OF PROTEIN SERUM: CPT

## 2023-07-03 PROCEDURE — 84165 PROTEIN E-PHORESIS SERUM: CPT

## 2023-07-03 NOTE — ANESTHESIA PRE PROCEDURE
Department of Anesthesiology  Preprocedure Note       Name:  Ghada Salinas   Age:  70 y.o.  :  1945                                          MRN:  [id number]         Date:  7/3/2023      Surgeon: Poonam Lugo):  Tiago Fenton MD    Procedure: Procedure(s):  EGD ESOPHAGOGASTRODUODENOSCOPY    Medications prior to admission:   Prior to Admission medications    Medication Sig Start Date End Date Taking? Authorizing Provider   metFORMIN (GLUCOPHAGE-XR) 500 MG extended release tablet in the morning and at bedtime 2tab BID w/ meals 23   Historical Provider, MD   apixaban (ELIQUIS) 5 MG TABS tablet Take 1 tablet by mouth 2 times daily 23   RADHA Ji CNP   omeprazole (PRILOSEC) 20 MG delayed release capsule Take 1 capsule by mouth daily    Historical Provider, MD   CINNAMON PO Take by mouth    Historical Provider, MD   aspirin 81 MG EC tablet Take 1 tablet by mouth daily    Historical Provider, MD   EPINEPHrine (EPIPEN 2-KHARI) 0.3 MG/0.3ML SOAJ injection Inject 0.3 mLs into the muscle once for 1 dose Use as directed for severe allergic reaction  Patient taking differently: Inject 0.3 mLs into the muscle as needed Use as directed for severe allergic reaction 22  Kelton Adams MD   finasteride (PROSCAR) 5 MG tablet Take 1 tablet by mouth daily 21  Historical Provider, MD   tadalafil (CIALIS) 5 MG tablet TAKE 1 TABLET BY MOUTH DAILY.  22   Historical Provider, MD   metoprolol tartrate (LOPRESSOR) 25 MG tablet Take 0.5 tablets by mouth 2 times daily 22   Rosa Kim MD   pravastatin (PRAVACHOL) 40 MG tablet 1 tablet daily 21   Historical Provider, MD   JANUVIA 100 MG tablet 1 tablet daily 21   Historical Provider, MD   Coenzyme Q10 (CO Q 10 PO) Take by mouth daily    Historical Provider, MD   Multiple Vitamins-Minerals (OCUVITE ADULT 50+ PO) Take by mouth daily    Historical Provider, MD   glimepiride (AMARYL) 4 MG tablet Take 1 tablet by

## 2023-07-03 NOTE — PROGRESS NOTES
Patient notified of procedure time at [facility] 7/5/23 0830 arrival 0700, spouse verbalized understanding

## 2023-07-04 LAB — HBV SURFACE AB SERPL IA-ACNC: 24.19 M[IU]/ML

## 2023-07-05 ENCOUNTER — ANESTHESIA (OUTPATIENT)
Dept: ENDOSCOPY | Age: 78
End: 2023-07-05
Payer: MEDICARE

## 2023-07-05 ENCOUNTER — HOSPITAL ENCOUNTER (OUTPATIENT)
Age: 78
Setting detail: OUTPATIENT SURGERY
Discharge: HOME | End: 2023-07-05
Attending: SPECIALIST | Admitting: SPECIALIST
Payer: MEDICARE

## 2023-07-05 VITALS
SYSTOLIC BLOOD PRESSURE: 185 MMHG | DIASTOLIC BLOOD PRESSURE: 91 MMHG | HEIGHT: 68 IN | BODY MASS INDEX: 28.49 KG/M2 | HEART RATE: 62 BPM | OXYGEN SATURATION: 96 % | WEIGHT: 188 LBS | TEMPERATURE: 98 F | RESPIRATION RATE: 16 BRPM

## 2023-07-05 PROBLEM — K74.69 OTHER CIRRHOSIS OF LIVER (HCC): Status: ACTIVE | Noted: 2023-07-05

## 2023-07-05 LAB — GLUCOSE BLD-MCNC: 114 MG/DL (ref 70–99)

## 2023-07-05 PROCEDURE — 3609017100 HC EGD: Performed by: SPECIALIST

## 2023-07-05 PROCEDURE — 2500000003 HC RX 250 WO HCPCS: Performed by: NURSE ANESTHETIST, CERTIFIED REGISTERED

## 2023-07-05 PROCEDURE — 3700000001 HC ADD 15 MINUTES (ANESTHESIA): Performed by: SPECIALIST

## 2023-07-05 PROCEDURE — 82962 GLUCOSE BLOOD TEST: CPT

## 2023-07-05 PROCEDURE — 7100000010 HC PHASE II RECOVERY - FIRST 15 MIN: Performed by: SPECIALIST

## 2023-07-05 PROCEDURE — 3700000000 HC ANESTHESIA ATTENDED CARE: Performed by: SPECIALIST

## 2023-07-05 PROCEDURE — 7100000011 HC PHASE II RECOVERY - ADDTL 15 MIN: Performed by: SPECIALIST

## 2023-07-05 PROCEDURE — 2709999900 HC NON-CHARGEABLE SUPPLY: Performed by: SPECIALIST

## 2023-07-05 PROCEDURE — 6360000002 HC RX W HCPCS: Performed by: NURSE ANESTHETIST, CERTIFIED REGISTERED

## 2023-07-05 PROCEDURE — 2580000003 HC RX 258: Performed by: SPECIALIST

## 2023-07-05 PROCEDURE — 43235 EGD DIAGNOSTIC BRUSH WASH: CPT | Performed by: SPECIALIST

## 2023-07-05 RX ORDER — PROPOFOL 10 MG/ML
INJECTION, EMULSION INTRAVENOUS PRN
Status: DISCONTINUED | OUTPATIENT
Start: 2023-07-05 | End: 2023-07-05 | Stop reason: SDUPTHER

## 2023-07-05 RX ORDER — LIDOCAINE HYDROCHLORIDE 20 MG/ML
INJECTION, SOLUTION EPIDURAL; INFILTRATION; INTRACAUDAL; PERINEURAL PRN
Status: DISCONTINUED | OUTPATIENT
Start: 2023-07-05 | End: 2023-07-05 | Stop reason: SDUPTHER

## 2023-07-05 RX ORDER — SODIUM CHLORIDE, SODIUM LACTATE, POTASSIUM CHLORIDE, CALCIUM CHLORIDE 600; 310; 30; 20 MG/100ML; MG/100ML; MG/100ML; MG/100ML
INJECTION, SOLUTION INTRAVENOUS CONTINUOUS
Status: DISCONTINUED | OUTPATIENT
Start: 2023-07-05 | End: 2023-07-05 | Stop reason: HOSPADM

## 2023-07-05 RX ADMIN — LIDOCAINE HYDROCHLORIDE 100 MG: 20 INJECTION, SOLUTION EPIDURAL; INFILTRATION; INTRACAUDAL; PERINEURAL at 08:41

## 2023-07-05 RX ADMIN — SODIUM CHLORIDE, POTASSIUM CHLORIDE, SODIUM LACTATE AND CALCIUM CHLORIDE: 600; 310; 30; 20 INJECTION, SOLUTION INTRAVENOUS at 07:39

## 2023-07-05 RX ADMIN — PROPOFOL 120 MG: 10 INJECTION, EMULSION INTRAVENOUS at 08:46

## 2023-07-05 ASSESSMENT — PAIN - FUNCTIONAL ASSESSMENT: PAIN_FUNCTIONAL_ASSESSMENT: 0-10

## 2023-07-05 ASSESSMENT — PAIN SCALES - GENERAL
PAINLEVEL_OUTOF10: 0
PAINLEVEL_OUTOF10: 0

## 2023-07-05 NOTE — PROGRESS NOTES
Pt returned to room from [facility] at [address]    Report received from [address], call light placed within reach, side rails up x's 2  0930 assisted pt up in room to get dressed, wife at bedside with pt  0945 Dr Daljit Kirk in room to see pt  1000 Discharge instructions given to pt and wife both, voiced understanding  1005 Pt escorted to main entrance via wheelchair for discharge.

## 2023-07-05 NOTE — DISCHARGE INSTRUCTIONS
EGD    DR. CALVO    OFFICE NUMBER [phone]        REPEAT PROCEDURE IN 3 YEARS OR AS  NEEDED. What to Expect at Home  Your Recovery:  The only discomfort after your EGD is generally limited to a mild soreness of the throat, which may last a day or two. Call your physician immediately if you have severe chest pain, shortness of breath or a temperature of 100 degrees or higher if taken orally. How can you care for yourself at home? Activity  Rest as much as you need to after you go home. You should be able to go back to your usual activities the day after the test.  Diet  Follow your doctor's directions for eating after the test.  Drink plenty of fluids (unless your doctor has told you not to). Medications  If you have a sore throat the day after the test, use an over-the-counter spray to numb your throat. Your doctor will tell you if and when you can restart your medicines. He or she will also give you instructions about taking any new medicines. If you take blood thinners, such as warfarin (Coumadin), clopidogrel (Plavix), or aspirin, be sure to talk to your doctor. He or she will tell you if and when to start taking those medicines again. Make sure that you understand exactly what your doctor wants you to do. If a biopsy was done during the test, your doctor may tell you not to take aspirin or other anti-inflammatory medicines for a few days. These include ibuprofen (Advil, Motrin) and naproxen (Aleve). DO NOT DRINK ANYTHING WITH ALCOHOL TODAY. Other instructions:Anesthesia  For your safety, do not drive or operate machinery for 24 hours. Do not sign legal documents or make major decisions for 24 hours. The anesthesia can make it hard for you to fully understand what you are agreeing to. Follow-up care is a key part of your treatment and safety. Be sure to make and go to all appointments, and call your doctor if you are having problems.  It's also a good idea to know your test results

## 2023-07-05 NOTE — ANESTHESIA POSTPROCEDURE EVALUATION
Department of Anesthesiology  Postprocedure Note    Patient: Love Chau  MRN: [id number]  YOB: 1945  Date of evaluation: 7/5/2023      Procedure Summary     Date: 07/05/23 Room / Location: [address] [place] [address]    Anesthesia Start: 1056 Anesthesia Stop: 9390    Procedure: EGD ESOPHAGOGASTRODUODENOSCOPY Diagnosis:       Alcoholic cirrhosis of liver without ascites ([address])      (Alcoholic cirrhosis of liver without ascites ([address]) [K70.30])    Surgeons: Lewis Antoine MD Responsible Provider: Emmit Merlin, MD    Anesthesia Type: MAC ASA Status: 3          Anesthesia Type: No value filed.     Chino Phase I: Chino Score: 10    Chino Phase II:        Anesthesia Post Evaluation    Patient location during evaluation: bedside  Patient participation: complete - patient participated  Level of consciousness: awake and alert  Pain score: 0  Airway patency: patent  Nausea & Vomiting: no nausea and no vomiting  Complications: no  Cardiovascular status: blood pressure returned to baseline  Respiratory status: acceptable  Hydration status: euvolemic

## 2023-07-06 LAB
A1AT SERPL-MCNC: 101 MG/DL (ref 90–200)
AFP-TM SERPL-MCNC: 6 NG/ML (ref 0–9)
HAV AB SER QL IA: POSITIVE

## 2023-07-07 LAB
ALBUMIN SERPL ELPH-MCNC: 3.5 GM/DL (ref 3.2–5.6)
ALPHA-1-GLOBULIN: 0.2 GM/DL (ref 0.1–0.4)
ALPHA-2-GLOBULIN: 0.8 GM/DL (ref 0.4–1.2)
BETA GLOBULIN: 1 GM/DL (ref 0.5–1.3)
GAMMA GLOBULIN: 1.2 GM/DL (ref 0.5–1.6)
MITOCHONDRIA M2 IGG SER-ACNC: 2.6 UNITS (ref 0–24.9)
NUCLEAR IGG SER QL IA: NORMAL
SMA IGG SER-ACNC: 6 UNITS (ref 0–19)
SPEP INTERPRETATION: NORMAL
TOTAL PROTEIN: 6.7 GM/DL (ref 6.4–8.2)

## 2023-08-15 ENCOUNTER — HOSPITAL ENCOUNTER (OUTPATIENT)
Age: 78
Discharge: HOME | End: 2023-08-15
Payer: MEDICARE

## 2023-08-15 DIAGNOSIS — D64.9 ANEMIA, UNSPECIFIED TYPE: ICD-10-CM

## 2023-08-15 LAB
HCT VFR BLD CALC: 37.3 % (ref 42–52)
HEMOGLOBIN: 12 GM/DL (ref 13.5–18)
MCH RBC QN AUTO: 29.9 PG (ref 27–31)
MCHC RBC AUTO-ENTMCNC: 32.2 % (ref 32–36)
MCV RBC AUTO: 93 FL (ref 78–100)
PDW BLD-RTO: 13 % (ref 11.7–14.9)
PLATELET # BLD: 110 K/CU MM (ref 140–440)
PMV BLD AUTO: 12.2 FL (ref 7.5–11.1)
RBC # BLD: 4.01 M/CU MM (ref 4.6–6.2)
WBC # BLD: 4.6 K/CU MM (ref 4–10.5)

## 2023-08-15 PROCEDURE — 85027 COMPLETE CBC AUTOMATED: CPT

## 2023-08-15 PROCEDURE — 84153 ASSAY OF PSA TOTAL: CPT

## 2023-08-15 PROCEDURE — 36415 COLL VENOUS BLD VENIPUNCTURE: CPT

## 2023-08-15 PROCEDURE — 84154 ASSAY OF PSA FREE: CPT

## 2023-08-17 ENCOUNTER — TELEPHONE (OUTPATIENT)
Dept: CARDIOLOGY CLINIC | Age: 78
End: 2023-08-17

## 2023-08-17 LAB
PSA FREE MFR SERPL: 20 %
PSA FREE SERPL-MCNC: 0.1 NG/ML
PSA SERPL IA-MCNC: 0.5 NG/ML (ref 0–4)

## 2023-08-17 NOTE — TELEPHONE ENCOUNTER
Called pt for lab result and Leigha's note: Normal CBC   Component Ref Range & Units 8/15/23 1627 6/13/23 1301 4/7/23 1311 11/13/22 2120 9/13/21 0452 9/12/21 0455 9/11/21 0500   WBC 4.0 - 10.5 K/CU MM 4.6  4.8  5.1  6.1  7.3  7.5  8.9    RBC 4.6 - 6.2 M/CU MM 4.01 Low   4.09 Low   4.09 Low   5.01  2.80 Low   2.69 Low   2.85 Low     Hemoglobin 13.5 - 18.0 GM/DL 12.0 Low   12.3 Low   12.4 Low   15.0  8.5 Low   8.2 Low   8.7 Low     Hematocrit 42 - 52 % 37.3 Low   38.1 Low   37.8 Low   45.1  26.0 Low   24.9 Low   26.6 Low     MCV 78 - 100 FL 93.0  93.2  92.4  90.0  92.9  92.6  93.3    MCH 27 - 31 PG 29.9  30.1  30.3  29.9  30.4  30.5  30.5    MCHC 32.0 - 36.0 % 32.2  32.3  32.8  33.3  32.7  32.9  32.7    RDW 11.7 - 14.9 % 13.0  12.9  13.0  12.5  13.4  13.8  13.3    Platelets 647 - 942 K/CU  Low   105 Low   129 Low   184  94 Low   70 Low   94 Low     MPV 7.5 - 11.1 FL 12.2 High   12.6 High   12.7 High   11.5 High   11.8 High   11.5 High   11.2 High     Pt's wife answered and took the message. She verbalized understanding.

## 2023-09-27 ENCOUNTER — HOSPITAL ENCOUNTER (OUTPATIENT)
Age: 78
Discharge: HOME | End: 2023-09-27
Payer: MEDICARE

## 2023-09-27 DIAGNOSIS — D64.9 ANEMIA, UNSPECIFIED TYPE: ICD-10-CM

## 2023-09-27 LAB
HCT VFR BLD CALC: 37.7 % (ref 42–52)
HEMOGLOBIN: 12.1 GM/DL (ref 13.5–18)
MCH RBC QN AUTO: 30.2 PG (ref 27–31)
MCHC RBC AUTO-ENTMCNC: 32.1 % (ref 32–36)
MCV RBC AUTO: 94 FL (ref 78–100)
PDW BLD-RTO: 13.2 % (ref 11.7–14.9)
PLATELET # BLD: 137 K/CU MM (ref 140–440)
PMV BLD AUTO: 12.3 FL (ref 7.5–11.1)
RBC # BLD: 4.01 M/CU MM (ref 4.6–6.2)
WBC # BLD: 4.6 K/CU MM (ref 4–10.5)

## 2023-09-27 PROCEDURE — 85027 COMPLETE CBC AUTOMATED: CPT

## 2023-09-27 PROCEDURE — 36415 COLL VENOUS BLD VENIPUNCTURE: CPT

## 2023-10-04 ENCOUNTER — TELEPHONE (OUTPATIENT)
Dept: CARDIOLOGY CLINIC | Age: 78
End: 2023-10-04

## 2023-10-19 LAB
ALBUMIN SERPL-MCNC: 4.1 G/DL
ALP BLD-CCNC: 159 U/L
ALT SERPL-CCNC: 63 U/L
ANION GAP SERPL CALCULATED.3IONS-SCNC: 1.5 MMOL/L
AST SERPL-CCNC: 70 U/L
BASOPHILS ABSOLUTE: ABNORMAL
BASOPHILS RELATIVE PERCENT: ABNORMAL
BILIRUB SERPL-MCNC: 1 MG/DL (ref 0.1–1.4)
BUN BLDV-MCNC: 20 MG/DL
CALCIUM SERPL-MCNC: 9.6 MG/DL
CHLORIDE BLD-SCNC: 107 MMOL/L
CO2: 23 MMOL/L
CREAT SERPL-MCNC: 1.1 MG/DL
EGFR: 69
EOSINOPHILS ABSOLUTE: ABNORMAL
EOSINOPHILS RELATIVE PERCENT: ABNORMAL
ESTIMATED AVERAGE GLUCOSE: 157
GLUCOSE BLD-MCNC: 188 MG/DL
HBA1C MFR BLD: 7.1 %
HCT VFR BLD CALC: 38.6 % (ref 41–53)
HEMOGLOBIN: 13.3 G/DL (ref 13.5–17.5)
LYMPHOCYTES ABSOLUTE: ABNORMAL
LYMPHOCYTES RELATIVE PERCENT: ABNORMAL
MCH RBC QN AUTO: 31.7 PG
MCHC RBC AUTO-ENTMCNC: 34.5 G/DL
MCV RBC AUTO: 91.9 FL
MONOCYTES ABSOLUTE: ABNORMAL
MONOCYTES RELATIVE PERCENT: ABNORMAL
NEUTROPHILS ABSOLUTE: ABNORMAL
NEUTROPHILS RELATIVE PERCENT: ABNORMAL
PLATELET # BLD: 168 K/ΜL
PMV BLD AUTO: 12.3 FL
POTASSIUM SERPL-SCNC: 5.3 MMOL/L
RBC # BLD: 4.2 10^6/ΜL
SODIUM BLD-SCNC: 138 MMOL/L
TOTAL PROTEIN: 6.8
WBC # BLD: 5.5 10^3/ML

## 2023-11-09 ENCOUNTER — HOSPITAL ENCOUNTER (OUTPATIENT)
Age: 78
Discharge: HOME | End: 2023-11-09
Payer: MEDICARE

## 2023-11-09 ENCOUNTER — OFFICE VISIT (OUTPATIENT)
Dept: GASTROENTEROLOGY | Age: 78
End: 2023-11-09
Payer: MEDICARE

## 2023-11-09 VITALS
HEART RATE: 61 BPM | OXYGEN SATURATION: 98 % | WEIGHT: 188.4 LBS | BODY MASS INDEX: 28.55 KG/M2 | TEMPERATURE: 97.2 F | SYSTOLIC BLOOD PRESSURE: 112 MMHG | DIASTOLIC BLOOD PRESSURE: 60 MMHG | HEIGHT: 68 IN

## 2023-11-09 DIAGNOSIS — K74.69 OTHER CIRRHOSIS OF LIVER (HCC): Primary | ICD-10-CM

## 2023-11-09 LAB
APTT: 36 SECONDS (ref 25.1–37.1)
INR BLD: 1.4 INDEX
PROTHROMBIN TIME: 17.3 SECONDS (ref 11.7–14.5)

## 2023-11-09 PROCEDURE — 3078F DIAST BP <80 MM HG: CPT | Performed by: SPECIALIST

## 2023-11-09 PROCEDURE — 3074F SYST BP LT 130 MM HG: CPT | Performed by: SPECIALIST

## 2023-11-09 PROCEDURE — G8417 CALC BMI ABV UP PARAM F/U: HCPCS | Performed by: SPECIALIST

## 2023-11-09 PROCEDURE — 85730 THROMBOPLASTIN TIME PARTIAL: CPT

## 2023-11-09 PROCEDURE — 85610 PROTHROMBIN TIME: CPT

## 2023-11-09 PROCEDURE — G8427 DOCREV CUR MEDS BY ELIG CLIN: HCPCS | Performed by: SPECIALIST

## 2023-11-09 PROCEDURE — 36415 COLL VENOUS BLD VENIPUNCTURE: CPT

## 2023-11-09 PROCEDURE — 99214 OFFICE O/P EST MOD 30 MIN: CPT | Performed by: SPECIALIST

## 2023-11-09 PROCEDURE — 1036F TOBACCO NON-USER: CPT | Performed by: SPECIALIST

## 2023-11-09 PROCEDURE — G8484 FLU IMMUNIZE NO ADMIN: HCPCS | Performed by: SPECIALIST

## 2023-11-09 PROCEDURE — 1123F ACP DISCUSS/DSCN MKR DOCD: CPT | Performed by: SPECIALIST

## 2023-11-09 RX ORDER — FLUDROCORTISONE ACETATE 0.1 MG/1
TABLET ORAL EVERY MORNING
COMMUNITY
Start: 2023-10-19

## 2023-11-09 RX ORDER — ACETAZOLAMIDE 250 MG/1
TABLET ORAL
COMMUNITY
Start: 2023-10-03

## 2023-11-21 ENCOUNTER — HOSPITAL ENCOUNTER (OUTPATIENT)
Dept: ULTRASOUND IMAGING | Age: 78
Discharge: HOME | End: 2023-11-21
Attending: SPECIALIST
Payer: MEDICARE

## 2023-11-21 DIAGNOSIS — K74.69 OTHER CIRRHOSIS OF LIVER (HCC): ICD-10-CM

## 2023-11-21 PROCEDURE — 76705 ECHO EXAM OF ABDOMEN: CPT

## 2023-11-29 ENCOUNTER — TELEPHONE (OUTPATIENT)
Dept: CARDIOLOGY CLINIC | Age: 78
End: 2023-11-29

## 2023-11-29 NOTE — TELEPHONE ENCOUNTER
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 spoke with chang (wife) regarding results , voiced understanding.

## 2023-11-29 NOTE — TELEPHONE ENCOUNTER
----- Message from RADHA Barrera CNP sent at 11/27/2023  7:45 PM EST -----  Continue current plan.  Chronic DVT NON OCCLUSIVE.  ----- Message -----  From: Katia Murphy MD  Sent: 11/21/2023   1:04 PM EST  To: RADHA Barrera CNP

## 2023-12-06 ENCOUNTER — TELEPHONE (OUTPATIENT)
Dept: CARDIOLOGY CLINIC | Age: 78
End: 2023-12-06

## 2023-12-06 NOTE — TELEPHONE ENCOUNTER
Cardiologist: Dr. Gardner [facility]  Surgeon: Dr. Chan Median  Surgery: Cystourethroscopy, with fulguration and/or resection of small bladder tumor  Anesthesia: General  Date: Pending  FAX# [phone]  # [phone]    Last OV 6/21/2023 w/Leigha    ASCVD  S/p CABG with LIMA to LAD and Diag, SVG to OM and SVG to PDA in sept 2021. . On  metoprolol 12.5 twice daily , cannot tolerate higher dose . He bp is better   Take aspirin 81 mg daily      Hypertension  Bp is in 90's but not symptomatic Advised to check blood pressure at home . metoprolol 12.5 twice daily for cardiac protection and to help with angina  Never had MI and there is no strong indication of metoprolol   He used to be on midodrine but not on it anymore      Lack of energy  Sleep apnea mild , did not want cpap , still waiting to hear from orthodontist      DVT R leg  Continue Eliquis 5 mg BID  Recheck US lower leg 6 months     Diabetes mellitus ([address])  Followed closely by Dr. Marbella Mendieta     Dyslipidemia   All available lab work was reviewed. Patient was advised to repeat lab work before next visit. Necessary orders were placed , instructions given by myself   LDL is in 60's         Last EKG- 11/13/2022      NM- 8/23/2021  Abnormal Study. Limited exercise capacity. 5 METs work load. Physiological BP response to exercise. ETT negative for Ischemia / Arrhythmia. Medium sized area of moderate inferior wall Ischemia. Normal LV function. LVEF is 55 %. Echo- 11/13/2022   Left ventricular systolic function is normal.   Ejection fraction is visually estimated at 50-55%. Grade I diastolic dysfunction. Moderately dilated left atrium. Mildly dilated right ventricle. Sclerotic, but non-stenotic aortic valve. No evidence of any pericardial effusion. Cath- 9/8/2021   1. LAD has proximal has 70-80 % lesion , mid LAD has 90 % lesion   , diffuse Diag disease   2. OM has 70-80 % lesion , Circ has 90 % lesion   3.  RCA has proximal 80-90 % lesion , PDA has

## 2023-12-07 NOTE — TELEPHONE ENCOUNTER
Proceed with surgery no further testing needed   Can hold anticoagulation for 2 days before surgery  moderate  risk for surgery  Hold aspirin for procedure

## 2023-12-11 ENCOUNTER — OFFICE VISIT (OUTPATIENT)
Dept: CARDIOLOGY CLINIC | Age: 78
End: 2023-12-11
Payer: MEDICARE

## 2023-12-11 VITALS
OXYGEN SATURATION: 95 % | BODY MASS INDEX: 26.98 KG/M2 | HEIGHT: 68 IN | SYSTOLIC BLOOD PRESSURE: 102 MMHG | HEART RATE: 80 BPM | WEIGHT: 178 LBS | DIASTOLIC BLOOD PRESSURE: 60 MMHG

## 2023-12-11 DIAGNOSIS — G47.10 HYPERSOMNIA: ICD-10-CM

## 2023-12-11 DIAGNOSIS — I25.10 CAD IN NATIVE ARTERY: Primary | ICD-10-CM

## 2023-12-11 DIAGNOSIS — Z87.891 EX-SMOKER: ICD-10-CM

## 2023-12-11 DIAGNOSIS — K74.69 OTHER CIRRHOSIS OF LIVER (HCC): ICD-10-CM

## 2023-12-11 DIAGNOSIS — R07.9 CHEST PAIN, UNSPECIFIED TYPE: ICD-10-CM

## 2023-12-11 DIAGNOSIS — I95.1 ORTHOSTATIC HYPOTENSION: ICD-10-CM

## 2023-12-11 DIAGNOSIS — R91.1 PULMONARY NODULE: ICD-10-CM

## 2023-12-11 DIAGNOSIS — I82.A11 ACUTE DEEP VEIN THROMBOSIS (DVT) OF AXILLARY VEIN OF RIGHT UPPER EXTREMITY (HCC): ICD-10-CM

## 2023-12-11 DIAGNOSIS — G47.33 OSA (OBSTRUCTIVE SLEEP APNEA): ICD-10-CM

## 2023-12-11 PROCEDURE — 3078F DIAST BP <80 MM HG: CPT | Performed by: INTERNAL MEDICINE

## 2023-12-11 PROCEDURE — 1123F ACP DISCUSS/DSCN MKR DOCD: CPT | Performed by: INTERNAL MEDICINE

## 2023-12-11 PROCEDURE — G8427 DOCREV CUR MEDS BY ELIG CLIN: HCPCS | Performed by: INTERNAL MEDICINE

## 2023-12-11 PROCEDURE — 1036F TOBACCO NON-USER: CPT | Performed by: INTERNAL MEDICINE

## 2023-12-11 PROCEDURE — 99214 OFFICE O/P EST MOD 30 MIN: CPT | Performed by: INTERNAL MEDICINE

## 2023-12-11 PROCEDURE — 93000 ELECTROCARDIOGRAM COMPLETE: CPT | Performed by: INTERNAL MEDICINE

## 2023-12-11 PROCEDURE — 3074F SYST BP LT 130 MM HG: CPT | Performed by: INTERNAL MEDICINE

## 2023-12-11 PROCEDURE — G8484 FLU IMMUNIZE NO ADMIN: HCPCS | Performed by: INTERNAL MEDICINE

## 2023-12-11 PROCEDURE — G8417 CALC BMI ABV UP PARAM F/U: HCPCS | Performed by: INTERNAL MEDICINE

## 2023-12-11 NOTE — PROGRESS NOTES
to auto triggers lowest heart rate 52 at 5:32 PM average heart rate 73 highest heart rate 70 1:10 AM.  Patient was primarily in sinus rhythm occasional PACs and PVC noted normal 30-day monitor        All labs, medications and tests reviewed by myself including data and history from outside source , patient and available family . Assessment & Plan:      1. CAD in native artery    2. Pulmonary nodule    3. Orthostatic hypotension    4. Acute deep vein thrombosis (DVT) of axillary vein of right upper extremity (HCC)    5. Other cirrhosis of liver ([address])    6. LIMA (obstructive sleep apnea)    7. Hypersomnia    8. Ex-smoker    9. Chest pain, unspecified type           Pre op  He has h/o bladder cancer he needs surgery he is on anticoagulation due to DVT of his leg ,He can hold eliquis 5mg bid ,hold eliquis for 3 days before surgery  , he can hold aspirin if absolutely needed   He has non occlusive DVT of popliteal vein , restart anticoagulation post procedure   HE is moderate risk for bladder surgery         ASCVD  S/p CABG with LIMA to LAD and Diag, SVG to OM and SVG to PDA in sept 2021. . On  metoprolol 12.5 twice daily , cannot tolerate higher dose . He bp is better   Take aspirin 81 mg daily   Hold metoprolol and see if it helps with fatigue     Hypertension  Bp is in 90's but not symptomatic Advised to check blood pressure at home .  metoprolol 12.5 twice daily for cardiac protection and to help with angina  Never had MI and there is no strong indication of metoprolol   He used to be on midodrine but not on it anymore     Fatigue  I Think it is due to lack of sleep , TSh is normal can cut down on metoprool to only at night to  see if it helps  30 days monitor shows no afib/ PAC and PVC         Lack of energy  Sleep apnea mild , did not want cpap , still waiting to hear from orthodontist     Right leg vericose/DVT   Initially seen in June 2023 , still has non occlusive DVT of  popliteal     Diabetes mellitus ([address])

## 2023-12-11 NOTE — PATIENT INSTRUCTIONS
**It is YOUR responsibilty to bring medication bottles and/or updated medication list to [address] [place] [address]. This will allow us to better serve you and all your healthcare needs**   [facility] Locations - No appointment necessary. Sites open Monday to Friday. Call your preferred location for test preparation, business   hours and other information you need. [employer] accepts 's. [address] [place] [address]. [address] [place], [address] [facility]  Phone: [phone]    Thank you for allowing us to care for you today! We want to ensure we can follow your treatment plan and we strive to give you the best outcomes and experience possible. If you ever have a life threatening emergency and call 911 - for an ambulance (EMS)   Our providers can only care for you at:   [facility] or [facility]. Even if you have someone take you or you drive yourself we can only care for you in a [facility]. Our providers are not setup at the other healthcare locations! Please be informed that if you contact our office outside of normal business hours the physician on call cannot help with any scheduling or rescheduling issues, procedure instruction questions or any type of medication issue. We advise you for any urgent/emergency that you go to the nearest emergency room! PLEASE CALL OUR OFFICE DURING NORMAL BUSINESS HOURS    Monday - Friday   8 am to 5 pm    [place]: [address] [place]: [phone]    [place]:  [phone]  We are committed to providing you the best care possible. If you receive a survey after visiting one of our offices, please take time to share your experience concerning your physician office visit. These surveys are confidential and no health information about you is shared. We are eager to improve for you and we are counting on your feedback to help make that happen. needle stick

## 2023-12-14 ENCOUNTER — TELEPHONE (OUTPATIENT)
Dept: CARDIOLOGY CLINIC | Age: 78
End: 2023-12-14

## 2023-12-14 NOTE — TELEPHONE ENCOUNTER
Barber vascular study from 11/21/23   To Dr. Caesar Rollins [phone]     [address] CD of study for pt to take to Dr. Bettye Sanchez office  No action needed

## 2024-01-09 LAB
ALBUMIN: 3.8 G/DL
ALP BLD-CCNC: 89 U/L
ALT SERPL-CCNC: 24 U/L
ANION GAP SERPL CALCULATED.3IONS-SCNC: NORMAL MMOL/L
AST SERPL-CCNC: 40 U/L
BILIRUB SERPL-MCNC: 0.7 MG/DL (ref 0.1–1.4)
BUN BLDV-MCNC: 12 MG/DL
CALCIUM SERPL-MCNC: 9 MG/DL
CHLORIDE BLD-SCNC: 102 MMOL/L
CHOLESTEROL, TOTAL: 116 MG/DL
CHOLESTEROL/HDL RATIO: NORMAL
CO2: 29 MMOL/L
CREAT SERPL-MCNC: 1 MG/DL
ESTIMATED AVERAGE GLUCOSE: NORMAL
GFR, ESTIMATED: NORMAL
GLUCOSE BLD-MCNC: 109 MG/DL
HBA1C MFR BLD: 7.4 %
HDLC SERPL-MCNC: 41 MG/DL (ref 35–70)
LDL CHOLESTEROL: 63
NONHDLC SERPL-MCNC: NORMAL MG/DL
POTASSIUM SERPL-SCNC: 3.9 MMOL/L
SODIUM BLD-SCNC: 141 MMOL/L
TOTAL PROTEIN: 6.7 G/DL (ref 6.4–8.2)
TRIGL SERPL-MCNC: 60 MG/DL
TSH SERPL DL<=0.05 MIU/L-ACNC: 1.73 UIU/ML
VLDLC SERPL CALC-MCNC: 12 MG/DL

## 2024-03-22 ENCOUNTER — TELEPHONE (OUTPATIENT)
Dept: CARDIOLOGY CLINIC | Age: 79
End: 2024-03-22

## 2024-04-01 ENCOUNTER — HOSPITAL ENCOUNTER (OUTPATIENT)
Age: 79
Discharge: HOME | End: 2024-04-01
Payer: MEDICARE

## 2024-04-01 DIAGNOSIS — K74.69 OTHER CIRRHOSIS OF LIVER (HCC): ICD-10-CM

## 2024-04-01 DIAGNOSIS — K74.60 CIRRHOSIS OF LIVER WITHOUT ASCITES, UNSPECIFIED HEPATIC CIRRHOSIS TYPE (HCC): ICD-10-CM

## 2024-04-01 LAB
ALBUMIN SERPL-MCNC: 3.7 GM/DL (ref 3.4–5)
ALP BLD-CCNC: 98 IU/L (ref 40–128)
ALT SERPL-CCNC: 25 U/L (ref 10–40)
ANION GAP SERPL CALCULATED.3IONS-SCNC: 10 MMOL/L (ref 7–16)
AST SERPL-CCNC: 41 IU/L (ref 15–37)
BILIRUB SERPL-MCNC: 0.3 MG/DL (ref 0–1)
BUN SERPL-MCNC: 14 MG/DL (ref 6–23)
CALCIUM SERPL-MCNC: 8.8 MG/DL (ref 8.3–10.6)
CHLORIDE BLD-SCNC: 101 MMOL/L (ref 99–110)
CO2: 26 MMOL/L (ref 21–32)
CREAT SERPL-MCNC: 0.9 MG/DL (ref 0.9–1.3)
GFR SERPL CREATININE-BSD FRML MDRD: 88 ML/MIN/1.73M2
GLUCOSE SERPL-MCNC: 233 MG/DL (ref 70–99)
INR BLD: 1.2 INDEX
POTASSIUM SERPL-SCNC: 4.3 MMOL/L (ref 3.5–5.1)
PROTHROMBIN TIME: 15.3 SECONDS (ref 11.7–14.5)
SODIUM BLD-SCNC: 137 MMOL/L (ref 135–145)
TOTAL PROTEIN: 6.4 GM/DL (ref 6.4–8.2)

## 2024-04-01 PROCEDURE — 80053 COMPREHEN METABOLIC PANEL: CPT

## 2024-04-01 PROCEDURE — 36415 COLL VENOUS BLD VENIPUNCTURE: CPT

## 2024-04-01 PROCEDURE — 85610 PROTHROMBIN TIME: CPT

## 2024-04-08 ENCOUNTER — TELEPHONE (OUTPATIENT)
Dept: CARDIOLOGY CLINIC | Age: 79
End: 2024-04-08

## 2024-04-08 NOTE — TELEPHONE ENCOUNTER
Patient reports 1 episode 4/6 and 2 episodes 4/7 of chest pain. Only lasted 1 second, quick stab of severe pain. Patient has had no issue today, OV scheduled

## 2024-04-09 ENCOUNTER — OFFICE VISIT (OUTPATIENT)
Dept: CARDIOLOGY CLINIC | Age: 79
End: 2024-04-09
Payer: MEDICARE

## 2024-04-09 VITALS
HEART RATE: 73 BPM | BODY MASS INDEX: 27.13 KG/M2 | WEIGHT: 179 LBS | DIASTOLIC BLOOD PRESSURE: 60 MMHG | SYSTOLIC BLOOD PRESSURE: 120 MMHG | HEIGHT: 68 IN

## 2024-04-09 DIAGNOSIS — I82.A11 ACUTE DEEP VEIN THROMBOSIS (DVT) OF AXILLARY VEIN OF RIGHT UPPER EXTREMITY (HCC): ICD-10-CM

## 2024-04-09 DIAGNOSIS — K74.69 OTHER CIRRHOSIS OF LIVER (HCC): ICD-10-CM

## 2024-04-09 DIAGNOSIS — I25.10 CAD IN NATIVE ARTERY: ICD-10-CM

## 2024-04-09 DIAGNOSIS — R55 SYNCOPE, UNSPECIFIED SYNCOPE TYPE: ICD-10-CM

## 2024-04-09 DIAGNOSIS — E11.9 TYPE 2 DIABETES MELLITUS WITHOUT COMPLICATION, WITHOUT LONG-TERM CURRENT USE OF INSULIN (HCC): ICD-10-CM

## 2024-04-09 DIAGNOSIS — I10 PRIMARY HYPERTENSION: ICD-10-CM

## 2024-04-09 DIAGNOSIS — I20.89 OTHER FORMS OF ANGINA PECTORIS (HCC): Primary | ICD-10-CM

## 2024-04-09 PROCEDURE — G8427 DOCREV CUR MEDS BY ELIG CLIN: HCPCS | Performed by: INTERNAL MEDICINE

## 2024-04-09 PROCEDURE — 93000 ELECTROCARDIOGRAM COMPLETE: CPT | Performed by: INTERNAL MEDICINE

## 2024-04-09 PROCEDURE — G8417 CALC BMI ABV UP PARAM F/U: HCPCS | Performed by: INTERNAL MEDICINE

## 2024-04-09 PROCEDURE — 1036F TOBACCO NON-USER: CPT | Performed by: INTERNAL MEDICINE

## 2024-04-09 PROCEDURE — 3074F SYST BP LT 130 MM HG: CPT | Performed by: INTERNAL MEDICINE

## 2024-04-09 PROCEDURE — 99214 OFFICE O/P EST MOD 30 MIN: CPT | Performed by: INTERNAL MEDICINE

## 2024-04-09 PROCEDURE — 1123F ACP DISCUSS/DSCN MKR DOCD: CPT | Performed by: INTERNAL MEDICINE

## 2024-04-09 PROCEDURE — 3078F DIAST BP <80 MM HG: CPT | Performed by: INTERNAL MEDICINE

## 2024-04-09 RX ORDER — NITROGLYCERIN 0.4 MG/1
0.4 TABLET SUBLINGUAL EVERY 5 MIN PRN
Qty: 25 TABLET | Refills: 3 | Status: SHIPPED | OUTPATIENT
Start: 2024-04-09

## 2024-04-09 RX ORDER — ISOSORBIDE MONONITRATE 30 MG/1
30 TABLET, EXTENDED RELEASE ORAL DAILY
Qty: 30 TABLET | Refills: 3 | Status: SHIPPED | OUTPATIENT
Start: 2024-04-09

## 2024-04-09 NOTE — PROGRESS NOTES
LDLCALC 68 03/21/2022     Lab Results   Component Value Date    ALT 25 04/01/2024    AST 41 (H) 04/01/2024     No results for input(s): \"WBC\", \"HGB\", \"HCT\", \"MCV\", \"PLT\" in the last 72 hours.  TSH: No results found for: \"TSH\"  Lab Results   Component Value Date    AST 41 (H) 04/01/2024    ALT 25 04/01/2024    BILIDIR 0.2 07/03/2023    BILITOT 0.3 04/01/2024    ALKPHOS 98 04/01/2024     No results found for: \"PROBNP\"  Lab Results   Component Value Date    LABA1C 7.1 10/19/2023    LABA1C 7.0 (H) 11/14/2022     Lab Results   Component Value Date    WBC 5.5 10/19/2023    HGB 13.3 (A) 10/19/2023    HCT 38.6 (A) 10/19/2023     10/19/2023     Stress test  8/23/21      Summary    Abnormal Study.    Limited exercise capacity. 5 METs work load.    Physiological BP response to exercise.    ETT negative for Ischemia / Arrhythmia.    Medium sized area of moderate inferior wall Ischemia.    Normal LV function. LVEF is 55 %.    Supervising physician Dr. Bautista .         Cath      Procedure Summary   Access : radial Indication : abnormal stress test   1. LAD has proximal has 70-80 % lesion , mid LAD has 90 % lesion   , diffuse Diag disease   2. OM has 70-80 % lesion , Circ has 90 % lesion   3. RCA has proximal 80-90 % lesion , PDA has 90 % lesion    Echo 9/8/21   Conclusions      Summary   Left ventricular systolic function is normal.   Ejection fraction is visually estimated at 50-55%.   Mild left ventricular hypertrophy.   Indeterminate diastolic function; E/A flow reversal is noted.   Sclerotic, but non-stenotic aortic valve.   No evidence of any pericardial effusion.       Nov 2022  Summary   Left ventricular systolic function is normal.   Ejection fraction is visually estimated at 50-55%.   Grade I diastolic dysfunction.   Moderately dilated left atrium.   Mildly dilated right ventricle.   Sclerotic, but non-stenotic aortic valve.   No evidence of any pericardial effusion.     Event monitor 1/2023  30-day monitor worn

## 2024-04-12 ENCOUNTER — HOSPITAL ENCOUNTER (OUTPATIENT)
Age: 79
Discharge: HOME | End: 2024-04-12
Payer: MEDICARE

## 2024-04-12 DIAGNOSIS — D64.9 ANEMIA, UNSPECIFIED TYPE: ICD-10-CM

## 2024-04-12 LAB
HCT VFR BLD CALC: 34.4 % (ref 42–52)
HEMOGLOBIN: 10.9 GM/DL (ref 13.5–18)
MCH RBC QN AUTO: 29 PG (ref 27–31)
MCHC RBC AUTO-ENTMCNC: 31.7 % (ref 32–36)
MCV RBC AUTO: 91.5 FL (ref 78–100)
PDW BLD-RTO: 13.8 % (ref 11.7–14.9)
PLATELET # BLD: 106 K/CU MM (ref 140–440)
PMV BLD AUTO: 12.1 FL (ref 7.5–11.1)
RBC # BLD: 3.76 M/CU MM (ref 4.6–6.2)
WBC # BLD: 4.7 K/CU MM (ref 4–10.5)

## 2024-04-12 PROCEDURE — 85027 COMPLETE CBC AUTOMATED: CPT

## 2024-04-12 PROCEDURE — 36415 COLL VENOUS BLD VENIPUNCTURE: CPT

## 2024-04-17 ENCOUNTER — TELEPHONE (OUTPATIENT)
Dept: CARDIOLOGY CLINIC | Age: 79
End: 2024-04-17

## 2024-04-17 NOTE — TELEPHONE ENCOUNTER
----- Message from RADHA Greenberg CNP sent at 4/15/2024  6:42 AM EDT -----  Blood count has dropped 3 gms over last 5 months. ? Bleeding or dark stools?  ----- Message -----  From: [facility] Incoming Lab Results From [employer] (Epic Adt)  Sent: 4/12/2024   3:35 PM EDT  To: RADHA Greenberg CNP

## 2024-04-22 ENCOUNTER — TELEPHONE (OUTPATIENT)
Dept: CARDIOLOGY CLINIC | Age: 79
End: 2024-04-22

## 2024-05-02 ENCOUNTER — HOSPITAL ENCOUNTER (OUTPATIENT)
Age: 79
Discharge: HOME | End: 2024-05-02
Payer: MEDICARE

## 2024-05-02 ENCOUNTER — TELEPHONE (OUTPATIENT)
Dept: CARDIOLOGY CLINIC | Age: 79
End: 2024-05-02

## 2024-05-02 ENCOUNTER — OFFICE VISIT (OUTPATIENT)
Dept: GASTROENTEROLOGY | Age: 79
End: 2024-05-02
Payer: MEDICARE

## 2024-05-02 VITALS
TEMPERATURE: 97.5 F | HEIGHT: 68 IN | WEIGHT: 180.8 LBS | DIASTOLIC BLOOD PRESSURE: 62 MMHG | HEART RATE: 67 BPM | BODY MASS INDEX: 27.4 KG/M2 | OXYGEN SATURATION: 96 % | SYSTOLIC BLOOD PRESSURE: 126 MMHG

## 2024-05-02 DIAGNOSIS — K74.60 CIRRHOSIS OF LIVER WITHOUT ASCITES, UNSPECIFIED HEPATIC CIRRHOSIS TYPE (HCC): ICD-10-CM

## 2024-05-02 DIAGNOSIS — K74.60 CIRRHOSIS OF LIVER WITHOUT ASCITES, UNSPECIFIED HEPATIC CIRRHOSIS TYPE (HCC): Primary | ICD-10-CM

## 2024-05-02 LAB
ALBUMIN SERPL-MCNC: 4 GM/DL (ref 3.4–5)
ALP BLD-CCNC: 95 IU/L (ref 40–128)
ALT SERPL-CCNC: 23 U/L (ref 10–40)
ANION GAP SERPL CALCULATED.3IONS-SCNC: 12 MMOL/L (ref 7–16)
AST SERPL-CCNC: 47 IU/L (ref 15–37)
BILIRUB SERPL-MCNC: 0.7 MG/DL (ref 0–1)
BUN SERPL-MCNC: 17 MG/DL (ref 6–23)
CALCIUM SERPL-MCNC: 8.9 MG/DL (ref 8.3–10.6)
CHLORIDE BLD-SCNC: 103 MMOL/L (ref 99–110)
CO2: 25 MMOL/L (ref 21–32)
CREAT SERPL-MCNC: 0.9 MG/DL (ref 0.9–1.3)
GFR, ESTIMATED: 88 ML/MIN/1.73M2
GLUCOSE SERPL-MCNC: 211 MG/DL (ref 70–99)
HCT VFR BLD CALC: 34.9 % (ref 42–52)
HEMOGLOBIN: 11.4 GM/DL (ref 13.5–18)
INR BLD: 1.1 INDEX
MCH RBC QN AUTO: 29.6 PG (ref 27–31)
MCHC RBC AUTO-ENTMCNC: 32.7 % (ref 32–36)
MCV RBC AUTO: 90.6 FL (ref 78–100)
PDW BLD-RTO: 13.9 % (ref 11.7–14.9)
PLATELET # BLD: 103 K/CU MM (ref 140–440)
PMV BLD AUTO: 12.2 FL (ref 7.5–11.1)
POTASSIUM SERPL-SCNC: 4.8 MMOL/L (ref 3.5–5.1)
PROTHROMBIN TIME: 14.5 SECONDS (ref 11.7–14.5)
RBC # BLD: 3.85 M/CU MM (ref 4.6–6.2)
SODIUM BLD-SCNC: 140 MMOL/L (ref 135–145)
TOTAL PROTEIN: 7.1 GM/DL (ref 6.4–8.2)
WBC # BLD: 3.9 K/CU MM (ref 4–10.5)

## 2024-05-02 PROCEDURE — 1036F TOBACCO NON-USER: CPT | Performed by: SPECIALIST

## 2024-05-02 PROCEDURE — 3078F DIAST BP <80 MM HG: CPT | Performed by: SPECIALIST

## 2024-05-02 PROCEDURE — 3074F SYST BP LT 130 MM HG: CPT | Performed by: SPECIALIST

## 2024-05-02 PROCEDURE — 36415 COLL VENOUS BLD VENIPUNCTURE: CPT

## 2024-05-02 PROCEDURE — 80053 COMPREHEN METABOLIC PANEL: CPT

## 2024-05-02 PROCEDURE — G8417 CALC BMI ABV UP PARAM F/U: HCPCS | Performed by: SPECIALIST

## 2024-05-02 PROCEDURE — 85027 COMPLETE CBC AUTOMATED: CPT

## 2024-05-02 PROCEDURE — 82105 ALPHA-FETOPROTEIN SERUM: CPT

## 2024-05-02 PROCEDURE — 1123F ACP DISCUSS/DSCN MKR DOCD: CPT | Performed by: SPECIALIST

## 2024-05-02 PROCEDURE — G8427 DOCREV CUR MEDS BY ELIG CLIN: HCPCS | Performed by: SPECIALIST

## 2024-05-02 PROCEDURE — 85610 PROTHROMBIN TIME: CPT

## 2024-05-02 PROCEDURE — 99214 OFFICE O/P EST MOD 30 MIN: CPT | Performed by: SPECIALIST

## 2024-05-02 NOTE — PROGRESS NOTES
CC: Follow up for cirrhosis    SUBJECTIVE:  No problems- denies abdominal pain,nausea,vomiting, diarrhea, constipation,melena, hematochezia, or hematemesis. Denies abdominal swelling, peripheral edema, periods of confusion    Last AFP:    2023 -  6   Last liver imagin/2023- no mass   Last EGD for varices: 2023-- no varices  Hepatitis A immunity: Yes - 10/2023, 2023 and 23,   Hepatitis B immunity: Yes  .alfts     ROS: non-contributory    OBJECTIVE:   PHYSICAL EXAM:    Vitals:  /62 (Site: Right Upper Arm, Position: Sitting, Cuff Size: Medium Adult)   Pulse 67   Temp 97.5 °F (36.4 °C) (Infrared)   Ht 1.727 m (5' 7.99\")   Wt 82 kg (180 lb 12.8 oz)   SpO2 96%   BMI 27.50 kg/m²   CONSTITUTIONAL: alert  EYES:  pupils equal, round and reactive to light and sclera clear  LUNGS:  clear to auscultation  CARDIOVASCULAR:  regular rate and rhythm and no murmur noted  ABDOMEN:  normal bowel sounds, soft, non-distended, non-tender and no masses palpated, no hepatosplenomegally  NEUROLOGIC: no asterixis or focal deficit detected   EXTREMITIES: no clubbing, cyanosis, or edema    ASSESSMENT:    1) cirrhosis- stable  2) history advanced colon adenomas- due for repeat colonoscopy in 2026  3) minor anemia-- no history of gross bleeding-will repeat CBC    PLAN:   1) CBC,CMP,PT   2) AFP   3) Ultrasound liver for hepatoma surveillance   4) return visit 3 months

## 2024-05-02 NOTE — TELEPHONE ENCOUNTER
Nuclear exercise stress test with myocardial perfusion      LV perfusion is normal. There is no evidence of inducible ischemia.    The ECG was negative for ischemia.    Stress Test: A Brain protocol stress test was performed. Overall, the patient's exercise capacity was above average for their age. The patient was stressed for 7 min and 30 sec. Hemodynamics are adequate for diagnosis. Blood pressure demonstrated a normal response and heart rate demonstrated a normal response to stress. The patient's heart rate recovery was normal.Completed 9 METS    Normal stress test .Ejection fraction was 56%   may leave msg on vm.  Next appt 07/09/24 130p    Lm on  regarding results.

## 2024-05-03 LAB — AFP-TM SERPL-MCNC: 6 NG/ML (ref 0–9)

## 2024-05-10 ENCOUNTER — HOSPITAL ENCOUNTER (OUTPATIENT)
Dept: ULTRASOUND IMAGING | Age: 79
Discharge: HOME | End: 2024-05-10
Payer: MEDICARE

## 2024-05-10 DIAGNOSIS — K74.60 CIRRHOSIS OF LIVER WITHOUT ASCITES, UNSPECIFIED HEPATIC CIRRHOSIS TYPE (HCC): ICD-10-CM

## 2024-05-10 PROCEDURE — 76705 ECHO EXAM OF ABDOMEN: CPT

## 2024-07-05 ENCOUNTER — HOSPITAL ENCOUNTER (OUTPATIENT)
Age: 79
Discharge: HOME | End: 2024-07-05
Payer: MEDICARE

## 2024-07-05 LAB
BASOPHILS ABSOLUTE: 0 K/CU MM
BASOPHILS RELATIVE PERCENT: 0.4 % (ref 0–1)
DIFFERENTIAL TYPE: ABNORMAL
EOSINOPHILS ABSOLUTE: 0.2 K/CU MM
EOSINOPHILS RELATIVE PERCENT: 4.5 % (ref 0–3)
HCT VFR BLD CALC: 36.5 % (ref 42–52)
HEMOGLOBIN: 12.4 GM/DL (ref 13.5–18)
IMMATURE NEUTROPHIL %: 0.2 % (ref 0–0.43)
LYMPHOCYTES ABSOLUTE: 1.1 K/CU MM
LYMPHOCYTES RELATIVE PERCENT: 23.2 % (ref 24–44)
MCH RBC QN AUTO: 30.6 PG (ref 27–31)
MCHC RBC AUTO-ENTMCNC: 34 % (ref 32–36)
MCV RBC AUTO: 90.1 FL (ref 78–100)
MONOCYTES ABSOLUTE: 0.4 K/CU MM
MONOCYTES RELATIVE PERCENT: 8.9 % (ref 0–4)
NEUTROPHILS ABSOLUTE: 2.9 K/CU MM
NEUTROPHILS RELATIVE PERCENT: 62.8 % (ref 36–66)
NUCLEATED RBC %: 0 %
PDW BLD-RTO: 13.6 % (ref 11.7–14.9)
PLATELET # BLD: 137 K/CU MM (ref 140–440)
PMV BLD AUTO: 12.7 FL (ref 7.5–11.1)
RBC # BLD: 4.05 M/CU MM (ref 4.6–6.2)
TOTAL IMMATURE NEUTOROPHIL: 0.01 K/CU MM
TOTAL NUCLEATED RBC: 0 K/CU MM
WBC # BLD: 4.6 K/CU MM (ref 4–10.5)

## 2024-07-05 PROCEDURE — 85025 COMPLETE CBC W/AUTO DIFF WBC: CPT

## 2024-07-05 PROCEDURE — 36415 COLL VENOUS BLD VENIPUNCTURE: CPT

## 2024-07-09 ENCOUNTER — TELEPHONE (OUTPATIENT)
Dept: CARDIOLOGY CLINIC | Age: 79
End: 2024-07-09

## 2024-07-09 ENCOUNTER — OFFICE VISIT (OUTPATIENT)
Dept: CARDIOLOGY CLINIC | Age: 79
End: 2024-07-09
Payer: MEDICARE

## 2024-07-09 VITALS
WEIGHT: 176.4 LBS | BODY MASS INDEX: 26.73 KG/M2 | HEIGHT: 68 IN | OXYGEN SATURATION: 97 % | HEART RATE: 60 BPM | DIASTOLIC BLOOD PRESSURE: 68 MMHG | SYSTOLIC BLOOD PRESSURE: 128 MMHG

## 2024-07-09 DIAGNOSIS — R07.9 CHEST PAIN, UNSPECIFIED TYPE: ICD-10-CM

## 2024-07-09 DIAGNOSIS — I82.A11 ACUTE DEEP VEIN THROMBOSIS (DVT) OF AXILLARY VEIN OF RIGHT UPPER EXTREMITY (HCC): ICD-10-CM

## 2024-07-09 DIAGNOSIS — E78.2 MIXED HYPERLIPIDEMIA: ICD-10-CM

## 2024-07-09 DIAGNOSIS — I25.10 CAD IN NATIVE ARTERY: ICD-10-CM

## 2024-07-09 DIAGNOSIS — I10 PRIMARY HYPERTENSION: ICD-10-CM

## 2024-07-09 DIAGNOSIS — K74.69 OTHER CIRRHOSIS OF LIVER (HCC): ICD-10-CM

## 2024-07-09 DIAGNOSIS — E11.9 TYPE 2 DIABETES MELLITUS WITHOUT COMPLICATION, WITHOUT LONG-TERM CURRENT USE OF INSULIN (HCC): ICD-10-CM

## 2024-07-09 DIAGNOSIS — G47.33 OSA (OBSTRUCTIVE SLEEP APNEA): ICD-10-CM

## 2024-07-09 DIAGNOSIS — R55 SYNCOPE, UNSPECIFIED SYNCOPE TYPE: Primary | ICD-10-CM

## 2024-07-09 PROCEDURE — 1123F ACP DISCUSS/DSCN MKR DOCD: CPT | Performed by: INTERNAL MEDICINE

## 2024-07-09 PROCEDURE — 3078F DIAST BP <80 MM HG: CPT | Performed by: INTERNAL MEDICINE

## 2024-07-09 PROCEDURE — 1036F TOBACCO NON-USER: CPT | Performed by: INTERNAL MEDICINE

## 2024-07-09 PROCEDURE — 99214 OFFICE O/P EST MOD 30 MIN: CPT | Performed by: INTERNAL MEDICINE

## 2024-07-09 PROCEDURE — G8417 CALC BMI ABV UP PARAM F/U: HCPCS | Performed by: INTERNAL MEDICINE

## 2024-07-09 PROCEDURE — G8427 DOCREV CUR MEDS BY ELIG CLIN: HCPCS | Performed by: INTERNAL MEDICINE

## 2024-07-09 PROCEDURE — 3074F SYST BP LT 130 MM HG: CPT | Performed by: INTERNAL MEDICINE

## 2024-07-09 RX ORDER — SILODOSIN 8 MG/1
8 CAPSULE ORAL EVERY EVENING
COMMUNITY

## 2024-07-09 RX ORDER — CHROMIUM PICOLINATE 200 MCG
TABLET ORAL DAILY
COMMUNITY

## 2024-07-09 RX ORDER — OMEPRAZOLE 20 MG/1
20 CAPSULE, DELAYED RELEASE ORAL DAILY
COMMUNITY

## 2024-07-09 NOTE — PATIENT INSTRUCTIONS
**It is YOUR responsibilty to bring medication bottles and/or updated medication list to EACH APPOINTMENT. This will allow us to better serve you and all your healthcare needs**  Thank you for allowing us to care for you today!   We want to ensure we can follow your treatment plan and we strive to give you the best outcomes and experience possible.   If you ever have a life threatening emergency and call 911 - for an ambulance (EMS)   Our providers can only care for you at:   [facility] or [facility].   Even if you have someone take you or you drive yourself we can only care for you in a [facility]. Our providers are not setup at the other healthcare locations!   Please be informed that if you contact our office outside of normal business hours the physician on call cannot help with any scheduling or rescheduling issues, procedure instruction questions or any type of medication issue.    We advise you for any urgent/emergency that you go to the nearest emergency room!    PLEASE CALL OUR OFFICE DURING NORMAL BUSINESS HOURS    Monday - Friday   8 am to 5 pm    [place]: [phone]    [place]: [phone]    [place]:  [phone]  We are committed to providing you the best care possible.    If you receive a survey after visiting one of our offices, please take time to share your experience concerning your physician office visit.  These surveys are confidential and no health information about you is shared.    We are eager to improve for you and we are counting on your feedback to help make that happen.

## 2024-07-09 NOTE — TELEPHONE ENCOUNTER
Called pt in regards to lab results. Pt voiced understanding of current plan and ov today.                    Component  Ref Range & Units 4 d ago  (7/5/24) 2 mo ago  (5/2/24) 2 mo ago  (4/12/24) 8 mo ago  (10/19/23) 9 mo ago  (9/27/23) 10 mo ago  (8/15/23) 1 yr ago  (6/13/23)   WBC  4.0 - 10.5 K/CU MM 4.6 3.9 Low  4.7 5.5 R 4.6 4.6 4.8   RBC  4.6 - 6.2 M/CU MM 4.05 Low  3.85 Low  3.76 Low  4.20 R 4.01 Low  4.01 Low  4.09 Low    Hemoglobin  13.5 - 18.0 GM/DL 12.4 Low  11.4 Low  10.9 Low  13.3 Abnormal  R 12.1 Low  12.0 Low  12.3 Low    Hematocrit  42 - 52 % 36.5 Low  34.9 Low  34.4 Low  38.6 Abnormal  R 37.7 Low  37.3 Low  38.1 Low    MCV  78 - 100 FL 90.1 90.6 91.5 91.9 R 94.0 93.0 93.2   MCH  27 - 31 PG 30.6 29.6 29.0 31.7 R 30.2 29.9 30.1   MCHC  32.0 - 36.0 % 34.0 32.7 31.7 Low  34.5 R 32.1 32.2 32.3   RDW  11.7 - 14.9 % 13.6 13.9 13.8  13.2 13.0 12.9   Platelets  140 - 440 K/CU  Low  103 Low  106 Low  168 R 137 Low  110 Low  105 Low    MPV  7.5 - 11.1 FL 12.7 High  12.2 High  12.1 High  12.3 R 12.3 High  12.2 High  12.6 High    Differential Type AUTOMATED DIFFERENTIAL         Neutrophils %  36 - 66 % 62.8   R      Lymphocytes %  24 - 44 % 23.2 Low    R      Monocytes %  0 - 4 % 8.9 High    R      Eosinophils %  0 - 3 % 4.5 High    R      Basophils %  0 - 1 % 0.4   R      Neutrophils Absolute  K/CU MM 2.9   R      Lymphocytes Absolute  K/CU MM 1.1   R      Monocytes Absolute  K/CU MM 0.4   R      Eosinophils Absolute  K/CU MM 0.2   R      Basophils Absolute  K/CU MM 0.0   R      Nucleated RBC %  % 0.0         Total Nucleated RBC  K/CU MM 0.0         Total Immature Neutrophil  K/CU MM 0.01         Immature Neutrophil %  0 - 0.43 % 0.2

## 2024-07-09 NOTE — TELEPHONE ENCOUNTER
----- Message from RADHA Greenberg CNP sent at 7/8/2024  6:21 AM EDT -----  Stable will discuss more at OV  ----- Message -----  From: [facility] Incoming Lab Results From [employer] (Epic Adt)  Sent: 7/5/2024   4:25 PM EDT  To: RADHA Greenberg CNP

## 2024-07-09 NOTE — PROGRESS NOTES
CARDIOLOGY   NOTE    Chief Complaint: Chest pain abnormal stress test    HPI:   Ed is a 79 y.o. year old who has Past medical history as noted below.  He says he had a pain like ice pick lasting 15  sec on 3 separate occasions in last few days not associated with any other symptoms while he was watching basketball on TV He did not have to use nitro   Romulo has  Coronary artery bypass grafting x4, left internal mammary artery,sequenced to diagonal branch artery and LAD.  Reverse saphenous vein,graft anastomosed to obtuse marginal 1 artery, reverse saphenous vein,graft anastomosed to distal right coronary artery in September 2021 . Diabetes is better after staring januvia .Blood pressure is much improved now   He says dizziness is  Better .  EKG today shows sinus rhythm  Due to ongoing palpitations he had Holter monitor in July 2020 which did not show any significant arrhythmias.  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       Current Outpatient Medications   Medication Sig Dispense Refill    silodosin (RAPAFLO) 8 MG CAPS Take 1 capsule by mouth every evening      omeprazole (PRILOSEC) 20 MG delayed release capsule Take 1 capsule by mouth daily      Chromium Picolinate 200 MCG TABS Take by mouth Daily      nitroGLYCERIN (NITROSTAT) 0.4 MG SL tablet Place 1 tablet under the tongue every 5 minutes as needed for Chest pain 25 tablet 3    metoprolol tartrate (LOPRESSOR) 25 MG tablet Take 0.5 tablets by mouth 2 times daily 90 tablet 4    metFORMIN (GLUCOPHAGE-XR) 500 MG extended release tablet in the morning and at bedtime 2tab BID w/ meals      CINNAMON PO Take by mouth      aspirin 81 MG EC tablet Take 1 tablet by mouth daily      finasteride (PROSCAR) 5 MG tablet Take 1 tablet by mouth daily      pravastatin (PRAVACHOL) 40 MG tablet 1 tablet daily

## 2024-08-02 ENCOUNTER — OFFICE VISIT (OUTPATIENT)
Dept: GASTROENTEROLOGY | Age: 79
End: 2024-08-02
Payer: MEDICARE

## 2024-08-02 VITALS
DIASTOLIC BLOOD PRESSURE: 42 MMHG | OXYGEN SATURATION: 94 % | HEART RATE: 67 BPM | HEIGHT: 68 IN | WEIGHT: 179.8 LBS | TEMPERATURE: 97.2 F | SYSTOLIC BLOOD PRESSURE: 114 MMHG | BODY MASS INDEX: 27.25 KG/M2

## 2024-08-02 DIAGNOSIS — K74.60 CIRRHOSIS OF LIVER WITHOUT ASCITES, UNSPECIFIED HEPATIC CIRRHOSIS TYPE (HCC): Primary | ICD-10-CM

## 2024-08-02 PROCEDURE — 99213 OFFICE O/P EST LOW 20 MIN: CPT | Performed by: SPECIALIST

## 2024-08-02 PROCEDURE — 3074F SYST BP LT 130 MM HG: CPT | Performed by: SPECIALIST

## 2024-08-02 PROCEDURE — 1123F ACP DISCUSS/DSCN MKR DOCD: CPT | Performed by: SPECIALIST

## 2024-08-02 PROCEDURE — 1036F TOBACCO NON-USER: CPT | Performed by: SPECIALIST

## 2024-08-02 PROCEDURE — G8417 CALC BMI ABV UP PARAM F/U: HCPCS | Performed by: SPECIALIST

## 2024-08-02 PROCEDURE — 3078F DIAST BP <80 MM HG: CPT | Performed by: SPECIALIST

## 2024-08-02 PROCEDURE — G8427 DOCREV CUR MEDS BY ELIG CLIN: HCPCS | Performed by: SPECIALIST

## 2024-08-02 NOTE — PROGRESS NOTES
CC: Follow up for cirrhosis    SUBJECTIVE:  No problems- denies abdominal pain,nausea,vomiting, diarrhea, constipation,melena, hematochezia, or hematemesis. Denies abdominal swelling, peripheral edema, periods of confusion    Last AFP:    5/2024---- 6      Last liver imaging: --- US 5/2024--- no mass  Last EGD for varices: 7/2023  Hepatitis A immunity: Yes   Hepatitis B immunity: Yes  .alfts     ROS: non-contributory    OBJECTIVE:   PHYSICAL EXAM:    Vitals:  BP (!) 114/42 (Site: Left Upper Arm, Position: Sitting, Cuff Size: Medium Adult)   Pulse 67   Temp 97.2 °F (36.2 °C) (Infrared)   Ht 1.727 m (5' 7.99\")   Wt 81.6 kg (179 lb 12.8 oz)   SpO2 94%   BMI 27.34 kg/m²   CONSTITUTIONAL: alert  EYES:  pupils equal, round and reactive to light and sclera clear  LUNGS:  clear to auscultation  CARDIOVASCULAR:  regular rate and rhythm and no murmur noted  ABDOMEN:  normal bowel sounds, soft, non-distended, non-tender and no masses palpated, no hepatosplenomegally  NEUROLOGIC: no asterixis or focal deficit detected   EXTREMITIES: no clubbing, cyanosis, or edema    ASSESSMENT:    1) 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

## 2025-01-09 ENCOUNTER — OFFICE VISIT (OUTPATIENT)
Dept: CARDIOLOGY CLINIC | Age: 80
End: 2025-01-09
Payer: MEDICARE

## 2025-01-09 VITALS
HEIGHT: 68 IN | SYSTOLIC BLOOD PRESSURE: 138 MMHG | BODY MASS INDEX: 26.98 KG/M2 | HEART RATE: 64 BPM | WEIGHT: 178 LBS | DIASTOLIC BLOOD PRESSURE: 76 MMHG

## 2025-01-09 DIAGNOSIS — R55 SYNCOPE, UNSPECIFIED SYNCOPE TYPE: ICD-10-CM

## 2025-01-09 DIAGNOSIS — E78.2 MIXED HYPERLIPIDEMIA: ICD-10-CM

## 2025-01-09 DIAGNOSIS — I82.5Z1 LOWER LEG DVT (DEEP VENOUS THROMBOEMBOLISM), CHRONIC, RIGHT (HCC): ICD-10-CM

## 2025-01-09 DIAGNOSIS — I25.10 CAD IN NATIVE ARTERY: Primary | ICD-10-CM

## 2025-01-09 DIAGNOSIS — I10 PRIMARY HYPERTENSION: ICD-10-CM

## 2025-01-09 DIAGNOSIS — G47.33 OSA (OBSTRUCTIVE SLEEP APNEA): ICD-10-CM

## 2025-01-09 DIAGNOSIS — I95.1 ORTHOSTATIC HYPOTENSION: ICD-10-CM

## 2025-01-09 PROCEDURE — G8427 DOCREV CUR MEDS BY ELIG CLIN: HCPCS | Performed by: NURSE PRACTITIONER

## 2025-01-09 PROCEDURE — 3078F DIAST BP <80 MM HG: CPT | Performed by: NURSE PRACTITIONER

## 2025-01-09 PROCEDURE — G8417 CALC BMI ABV UP PARAM F/U: HCPCS | Performed by: NURSE PRACTITIONER

## 2025-01-09 PROCEDURE — 3075F SYST BP GE 130 - 139MM HG: CPT | Performed by: NURSE PRACTITIONER

## 2025-01-09 PROCEDURE — 1036F TOBACCO NON-USER: CPT | Performed by: NURSE PRACTITIONER

## 2025-01-09 PROCEDURE — 1123F ACP DISCUSS/DSCN MKR DOCD: CPT | Performed by: NURSE PRACTITIONER

## 2025-01-09 PROCEDURE — 1160F RVW MEDS BY RX/DR IN RCRD: CPT | Performed by: NURSE PRACTITIONER

## 2025-01-09 PROCEDURE — 1159F MED LIST DOCD IN RCRD: CPT | Performed by: NURSE PRACTITIONER

## 2025-01-09 PROCEDURE — 99214 OFFICE O/P EST MOD 30 MIN: CPT | Performed by: NURSE PRACTITIONER

## 2025-01-09 RX ORDER — METOPROLOL TARTRATE 25 MG/1
12.5 TABLET, FILM COATED ORAL 2 TIMES DAILY
Qty: 90 TABLET | Refills: 4 | Status: SHIPPED | OUTPATIENT
Start: 2025-01-09

## 2025-01-09 NOTE — PROGRESS NOTES
tadalafil (CIALIS) 5 MG tablet TAKE 1 TABLET BY MOUTH DAILY.  Patient not taking: Reported on 7/9/2024 6/7/22   Provider, Abigail, MD       Physical Exam  Vitals reviewed.   Constitutional:       General: He is not in acute distress.     Appearance: Normal appearance. He is not ill-appearing.   HENT:      Head: Atraumatic.   Neck:      Vascular: No carotid bruit.   Cardiovascular:      Rate and Rhythm: Normal rate and regular rhythm.      Pulses: Normal pulses.      Heart sounds: Normal heart sounds. No murmur heard.  Pulmonary:      Effort: Pulmonary effort is normal. No respiratory distress.      Breath sounds: Normal breath sounds.   Musculoskeletal:         General: No swelling or deformity.      Cervical back: Neck supple. No muscular tenderness.   Neurological:      Mental Status: He is alert.         Stress test  8/23/21       Summary    Abnormal Study.    Limited exercise capacity. 5 METs work load.    Physiological BP response to exercise.    ETT negative for Ischemia / Arrhythmia.    Medium sized area of moderate inferior wall Ischemia.    Normal LV function. LVEF is 55 %.    Supervising physician Dr. Bautista .          Cath      Procedure Summary   Access : radial Indication : abnormal stress test   1. LAD has proximal has 70-80 % lesion , mid LAD has 90 % lesion   , diffuse Diag disease   2. OM has 70-80 % lesion , Circ has 90 % lesion   3. RCA has proximal 80-90 % lesion , PDA has 90 % lesion     Echo 9/8/21   Conclusions      Summary   Left ventricular systolic function is normal.   Ejection fraction is visually estimated at 50-55%.   Mild left ventricular hypertrophy.   Indeterminate diastolic function; E/A flow reversal is noted.   Sclerotic, but non-stenotic aortic valve.   No evidence of any pericardial effusion.        Nov 2022  Summary   Left ventricular systolic function is normal.   Ejection fraction is visually estimated at 50-55%.   Grade I diastolic dysfunction.   Moderately dilated

## 2025-02-04 ENCOUNTER — HOSPITAL ENCOUNTER (OUTPATIENT)
Age: 80
Setting detail: SPECIMEN
Discharge: HOME | End: 2025-02-04
Payer: MEDICARE

## 2025-02-04 PROCEDURE — 88312 SPECIAL STAINS GROUP 1: CPT | Performed by: PATHOLOGY

## 2025-02-04 PROCEDURE — 36415 COLL VENOUS BLD VENIPUNCTURE: CPT

## 2025-02-04 PROCEDURE — 88305 TISSUE EXAM BY PATHOLOGIST: CPT | Performed by: PATHOLOGY

## 2025-02-04 PROCEDURE — 88108 CYTOPATH CONCENTRATE TECH: CPT | Performed by: PATHOLOGY

## 2025-02-05 ENCOUNTER — OFFICE VISIT (OUTPATIENT)
Dept: GASTROENTEROLOGY | Age: 80
End: 2025-02-05
Payer: MEDICARE

## 2025-02-05 VITALS
SYSTOLIC BLOOD PRESSURE: 128 MMHG | HEIGHT: 68 IN | OXYGEN SATURATION: 94 % | BODY MASS INDEX: 26.73 KG/M2 | WEIGHT: 176.4 LBS | RESPIRATION RATE: 16 BRPM | DIASTOLIC BLOOD PRESSURE: 74 MMHG | HEART RATE: 67 BPM

## 2025-02-05 DIAGNOSIS — D12.6 TUBULAR ADENOMA OF COLON: ICD-10-CM

## 2025-02-05 DIAGNOSIS — K74.60 CIRRHOSIS OF LIVER WITHOUT ASCITES, UNSPECIFIED HEPATIC CIRRHOSIS TYPE (HCC): Primary | ICD-10-CM

## 2025-02-05 DIAGNOSIS — D69.6 THROMBOCYTOPENIA (HCC): ICD-10-CM

## 2025-02-05 PROCEDURE — 3074F SYST BP LT 130 MM HG: CPT | Performed by: INTERNAL MEDICINE

## 2025-02-05 PROCEDURE — 1159F MED LIST DOCD IN RCRD: CPT | Performed by: INTERNAL MEDICINE

## 2025-02-05 PROCEDURE — 1123F ACP DISCUSS/DSCN MKR DOCD: CPT | Performed by: INTERNAL MEDICINE

## 2025-02-05 PROCEDURE — 3078F DIAST BP <80 MM HG: CPT | Performed by: INTERNAL MEDICINE

## 2025-02-05 PROCEDURE — 99214 OFFICE O/P EST MOD 30 MIN: CPT | Performed by: INTERNAL MEDICINE

## 2025-02-05 PROCEDURE — G2211 COMPLEX E/M VISIT ADD ON: HCPCS | Performed by: INTERNAL MEDICINE

## 2025-02-05 PROCEDURE — G8427 DOCREV CUR MEDS BY ELIG CLIN: HCPCS | Performed by: INTERNAL MEDICINE

## 2025-02-05 PROCEDURE — 1036F TOBACCO NON-USER: CPT | Performed by: INTERNAL MEDICINE

## 2025-02-05 PROCEDURE — G8417 CALC BMI ABV UP PARAM F/U: HCPCS | Performed by: INTERNAL MEDICINE

## 2025-02-05 NOTE — PROGRESS NOTES
MELD score.  Discussed the importance of getting INR for calculation of MELD score.  -Likely secondary to GARCÍA.  -Most recent FibroScan done on 04/17/2023 with significant steatosis S3 and fibrosis along with an elevated fib 4 score of 4.97 suggesting advanced fibrosis F3.  -Last imaging with coarsened liver and splenomegaly.  -Repeat labs ordered for MELD calculation  - Ascites: No ascites, low-sodium diet  - SBP pxs: None  - Varices surveillance/pxs: Last EGD for varices in July 2023.  No varices noted.  Will need repeat EGD this July 2025 or at any point when he goes from compensated to decompensated cirrhosis.  - Vaccination status: Immune to hepatitis A and B  - HCC screening: US and AFP q6-12 mos or CT/MRI -Last US in 05/02/2024.  Due for repeat  - Tylenol less than 2 g/d  - avoid NSAID's   - avoid eating raw seafood    #2 multiple colon polyps  Colonoscopy done in February 2023 with multiple polyps.  Path revealing tubular adenoma apart from rectal polyp which was noted to be hyperplastic.  Repeat colonoscopy in 3 years.    #3 mild thrombocytopenia.  Likely secondary to cirrhosis.  Stable     No follow-ups on file.    Addison Pike MD 2/5/2025 9:27 AM     Time of note may not reflect time of encounter     Patient was seen with total face-to-face time and non-face-to-face time spent reviewing chart, placing orders, and reviewing past/current results of labs and images of over 30 minutes. More than 50% of this visit was counseling on diet, nutrition, endoscopic options, and education as above documented in my note.     CC: Referring MD

## 2025-02-07 LAB — NON-GYN CYTOLOGY REPORT: NORMAL

## 2025-07-10 ENCOUNTER — OFFICE VISIT (OUTPATIENT)
Dept: CARDIOLOGY CLINIC | Age: 80
End: 2025-07-10
Payer: MEDICARE

## 2025-07-10 VITALS
WEIGHT: 176 LBS | BODY MASS INDEX: 26.67 KG/M2 | HEIGHT: 68 IN | SYSTOLIC BLOOD PRESSURE: 128 MMHG | HEART RATE: 65 BPM | DIASTOLIC BLOOD PRESSURE: 70 MMHG

## 2025-07-10 DIAGNOSIS — G47.33 OSA (OBSTRUCTIVE SLEEP APNEA): ICD-10-CM

## 2025-07-10 DIAGNOSIS — R55 SYNCOPE, UNSPECIFIED SYNCOPE TYPE: ICD-10-CM

## 2025-07-10 DIAGNOSIS — I10 PRIMARY HYPERTENSION: ICD-10-CM

## 2025-07-10 DIAGNOSIS — I25.10 CAD IN NATIVE ARTERY: Primary | ICD-10-CM

## 2025-07-10 DIAGNOSIS — R42 DIZZINESS: Primary | ICD-10-CM

## 2025-07-10 DIAGNOSIS — I82.5Z1 LOWER LEG DVT (DEEP VENOUS THROMBOEMBOLISM), CHRONIC, RIGHT (HCC): ICD-10-CM

## 2025-07-10 DIAGNOSIS — E78.2 MIXED HYPERLIPIDEMIA: ICD-10-CM

## 2025-07-10 DIAGNOSIS — I25.10 CAD IN NATIVE ARTERY: ICD-10-CM

## 2025-07-10 DIAGNOSIS — I95.1 ORTHOSTATIC HYPOTENSION: ICD-10-CM

## 2025-07-10 PROCEDURE — 93000 ELECTROCARDIOGRAM COMPLETE: CPT | Performed by: NURSE PRACTITIONER

## 2025-07-10 PROCEDURE — 1036F TOBACCO NON-USER: CPT | Performed by: NURSE PRACTITIONER

## 2025-07-10 PROCEDURE — 3074F SYST BP LT 130 MM HG: CPT | Performed by: NURSE PRACTITIONER

## 2025-07-10 PROCEDURE — 99214 OFFICE O/P EST MOD 30 MIN: CPT | Performed by: NURSE PRACTITIONER

## 2025-07-10 PROCEDURE — 3078F DIAST BP <80 MM HG: CPT | Performed by: NURSE PRACTITIONER

## 2025-07-10 PROCEDURE — G8427 DOCREV CUR MEDS BY ELIG CLIN: HCPCS | Performed by: NURSE PRACTITIONER

## 2025-07-10 PROCEDURE — G8417 CALC BMI ABV UP PARAM F/U: HCPCS | Performed by: NURSE PRACTITIONER

## 2025-07-10 PROCEDURE — 1159F MED LIST DOCD IN RCRD: CPT | Performed by: NURSE PRACTITIONER

## 2025-07-10 PROCEDURE — 1123F ACP DISCUSS/DSCN MKR DOCD: CPT | Performed by: NURSE PRACTITIONER

## 2025-07-10 RX ORDER — NITROGLYCERIN 0.4 MG/1
0.4 TABLET SUBLINGUAL EVERY 5 MIN PRN
Qty: 25 TABLET | Refills: 0 | Status: SHIPPED | OUTPATIENT
Start: 2025-07-10

## 2025-07-10 ASSESSMENT — ENCOUNTER SYMPTOMS
SHORTNESS OF BREATH: 0
COUGH: 0

## 2025-07-10 NOTE — PROGRESS NOTES
CLINICAL STAFF DOCUMENTATION    Leigha Galvan, KARY     Ed LI Raj  1945  [id number]    Have you had any Chest Pain recently? - No          Have you had any Shortness of Breath - No    Have you had any dizziness - No    Have you had any palpitations recently? - No    Any thyroid issues? - No    Do you have any edema - swelling in No            When did you have your last labs drawn 2/5/25    Do we have the labs in their chart Yes      If we do not have these labs, you are retrieve these labs for the provider!    Do you need any prescriptions refilled? - Yes    Do you have a surgery or procedure scheduled in the near future - No      Do use tobacco products? - No  Do you drink alcohol? - No  Do you use any illicit drugs? - No  Caffeine? - Yes  How much caffeine? .1  cups       Check medication list thoroughly!!! AND RECONCILE OUTSIDE MEDICATIONS  If dose has changed change the entire order not just the MG  BE SURE TO ASK PATIENT IF THEY NEED MEDICATION REFILLS  Verify Pharmacy and update if incorrect    Add to every patient's \"wrap up\" the following dot phrase AFTERVISITCARDIOHEARTHOUSE and ensure we explain this to our patients   
 The ECG was negative for ischemia.    Stress Test: A Brain protocol stress test was performed. Overall, the patient's exercise capacity was above average for their age. The patient was stressed for 7 min and 30 sec. Hemodynamics are adequate for diagnosis. Blood pressure demonstrated a normal response and heart rate demonstrated a normal response to stress. The patient's heart rate recovery was normal.Completed 9 METS    Normal stress test .Ejection fraction was 56%    ASSESSMENT/PLAN:  CAD in native artery  S/p CABG with LIMA to LAD and Diag, SVG to OM and SVG to PDA in sept 2021   Continue asa  We discussed that he did not have MI therefore we will stop metoprolol     Orthostatic hypotension  Primary hypertension  Controlled   Denies syncope.     Dizziness   Occurs with hyperextension of neck and turning head to the Left.   Will check carotid US to rule out progression of mild carotid stenosis noted on US 2019 prior to CABG.     LIMA (obstructive sleep apnea)  Sleep study was borderline.   He did not hear from orthodontist.  We discussed regular dentist getting dental appliance     Syncope, unspecified syncope type  resolved    Lower leg DVT (deep venous thromboembolism), chronic, right (HCC)  Non occlusive per venous US 11/2023.   He completed treatment     Mixed hyperlipidemia  Will get recent records for lipids  1/2024 was acceptable.   Continue pravastatin       No follow-ups on file.      An electronic signature was used to authenticate this note.    Electronically signed by RADHA Greenberg CNP on 7/10/2025 at 3:47 PM   The patient (or guardian, if applicable) and other individuals in attendance with the patient were advised that Artificial Intelligence will be utilized during this visit to record, process the conversation to generate a clinical note, and support improvement of the AI technology. The patient (or guardian, if applicable) and other individuals in attendance at the appointment consented

## 2025-07-10 NOTE — PATIENT INSTRUCTIONS
Thank you for allowing us to care for you today!   We want to ensure we can follow your treatment plan and we strive to give you the best outcomes and experience possible.   If you ever have a life threatening emergency and call 911 - for an ambulance (EMS)  REMEMBER  Our providers can only care for you at:   [facility] or [facility]   Even if you have someone take you or you drive yourself we can only care for you in a [facility] facility. Our providers are not setup at the other healthcare locations!    PLEASE CALL OUR OFFICE DURING NORMAL BUSINESS HOURS  Monday through Friday 8 am to 5 pm  AFTER HOURS the physician on-call cannot help with scheduling, rescheduling, procedure instruction questions or any type of medication need or issue.  [facility] P:[phone] - [facility] P:[phone] - [facility] P:[phone]      If you receive a survey:  We would appreciate you taking the time to share your experience concerning your provider visit in the office.    These surveys are confidential!  We are eager to improve and are counting on you to share your feedback so we can ensure you get the best care possible.

## 2025-07-21 ENCOUNTER — HOSPITAL ENCOUNTER (OUTPATIENT)
Dept: ULTRASOUND IMAGING | Age: 80
Discharge: HOME | End: 2025-07-21
Attending: INTERNAL MEDICINE
Payer: MEDICARE

## 2025-07-21 DIAGNOSIS — K74.60 CIRRHOSIS OF LIVER WITHOUT ASCITES, UNSPECIFIED HEPATIC CIRRHOSIS TYPE (HCC): ICD-10-CM

## 2025-07-21 PROCEDURE — 76705 ECHO EXAM OF ABDOMEN: CPT

## 2025-07-29 ENCOUNTER — HOSPITAL ENCOUNTER (OUTPATIENT)
Age: 80
Discharge: HOME | End: 2025-07-29
Payer: MEDICARE

## 2025-07-29 DIAGNOSIS — K74.60 CIRRHOSIS OF LIVER WITHOUT ASCITES, UNSPECIFIED HEPATIC CIRRHOSIS TYPE (HCC): ICD-10-CM

## 2025-07-29 LAB
ALBUMIN SERPL-MCNC: 3.7 G/DL (ref 3.4–5)
ALBUMIN/GLOB SERPL: 1.3 {RATIO} (ref 1.1–2.2)
ALP SERPL-CCNC: 81 U/L (ref 40–129)
ALT SERPL-CCNC: 23 U/L (ref 10–40)
AMMONIA PLAS-SCNC: 33 UMOL/L (ref 16–60)
ANION GAP SERPL CALCULATED.3IONS-SCNC: 9 MMOL/L (ref 9–17)
AST SERPL-CCNC: 36 U/L (ref 15–37)
BILIRUB SERPL-MCNC: 0.9 MG/DL (ref 0–1)
BUN SERPL-MCNC: 19 MG/DL (ref 7–20)
CALCIUM SERPL-MCNC: 9.2 MG/DL (ref 8.3–10.6)
CHLORIDE SERPL-SCNC: 103 MMOL/L (ref 99–110)
CO2 SERPL-SCNC: 27 MMOL/L (ref 21–32)
CREAT SERPL-MCNC: 1.2 MG/DL (ref 0.8–1.3)
ERYTHROCYTE [DISTWIDTH] IN BLOOD BY AUTOMATED COUNT: 14 % (ref 11.7–14.9)
GFR, ESTIMATED: 62 ML/MIN/1.73M2
GLUCOSE SERPL-MCNC: 100 MG/DL (ref 74–99)
HCT VFR BLD AUTO: 34.6 % (ref 42–52)
HGB BLD-MCNC: 11.3 G/DL (ref 13.5–18)
INR PPP: 1.2
MCH RBC QN AUTO: 29.9 PG (ref 27–31)
MCHC RBC AUTO-ENTMCNC: 32.7 G/DL (ref 32–36)
MCV RBC AUTO: 91.5 FL (ref 78–100)
PLATELET CONFIRMATION: NORMAL
PLATELET, FLUORESCENCE: 90 K/UL (ref 140–440)
PMV BLD AUTO: 12.4 FL (ref 7.5–11.1)
POTASSIUM SERPL-SCNC: 4.2 MMOL/L (ref 3.5–5.1)
PROT SERPL-MCNC: 6.5 G/DL (ref 6.4–8.2)
PROTHROMBIN TIME: 16.1 SEC (ref 11.7–14.5)
RBC # BLD AUTO: 3.78 M/UL (ref 4.6–6.2)
SODIUM SERPL-SCNC: 139 MMOL/L (ref 136–145)
WBC OTHER # BLD: 4 K/UL (ref 4–10.5)

## 2025-07-29 PROCEDURE — 80053 COMPREHEN METABOLIC PANEL: CPT

## 2025-07-29 PROCEDURE — 82140 ASSAY OF AMMONIA: CPT

## 2025-07-29 PROCEDURE — 85027 COMPLETE CBC AUTOMATED: CPT

## 2025-07-29 PROCEDURE — 85610 PROTHROMBIN TIME: CPT

## 2025-08-06 ENCOUNTER — HOSPITAL ENCOUNTER (OUTPATIENT)
Dept: LAB | Age: 80
Discharge: HOME | End: 2025-08-06
Payer: MEDICARE

## 2025-08-06 ENCOUNTER — OFFICE VISIT (OUTPATIENT)
Dept: GASTROENTEROLOGY | Age: 80
End: 2025-08-06
Payer: MEDICARE

## 2025-08-06 VITALS
RESPIRATION RATE: 16 BRPM | OXYGEN SATURATION: 97 % | DIASTOLIC BLOOD PRESSURE: 64 MMHG | WEIGHT: 175.6 LBS | HEART RATE: 71 BPM | SYSTOLIC BLOOD PRESSURE: 122 MMHG | BODY MASS INDEX: 26.61 KG/M2 | HEIGHT: 68 IN

## 2025-08-06 DIAGNOSIS — K74.60 CIRRHOSIS OF LIVER WITHOUT ASCITES, UNSPECIFIED HEPATIC CIRRHOSIS TYPE (HCC): Primary | ICD-10-CM

## 2025-08-06 PROCEDURE — G8427 DOCREV CUR MEDS BY ELIG CLIN: HCPCS | Performed by: INTERNAL MEDICINE

## 2025-08-06 PROCEDURE — 3074F SYST BP LT 130 MM HG: CPT | Performed by: INTERNAL MEDICINE

## 2025-08-06 PROCEDURE — 99214 OFFICE O/P EST MOD 30 MIN: CPT | Performed by: INTERNAL MEDICINE

## 2025-08-06 PROCEDURE — 1123F ACP DISCUSS/DSCN MKR DOCD: CPT | Performed by: INTERNAL MEDICINE

## 2025-08-06 PROCEDURE — 3078F DIAST BP <80 MM HG: CPT | Performed by: INTERNAL MEDICINE

## 2025-08-06 PROCEDURE — 1036F TOBACCO NON-USER: CPT | Performed by: INTERNAL MEDICINE

## 2025-08-06 PROCEDURE — 87086 URINE CULTURE/COLONY COUNT: CPT

## 2025-08-06 PROCEDURE — 1159F MED LIST DOCD IN RCRD: CPT | Performed by: INTERNAL MEDICINE

## 2025-08-06 PROCEDURE — G8417 CALC BMI ABV UP PARAM F/U: HCPCS | Performed by: INTERNAL MEDICINE

## 2025-08-07 LAB
MICROORGANISM SPEC CULT: NORMAL
SPECIMEN DESCRIPTION: NORMAL

## (undated) DEVICE — LEAD PACE L475MM CHNL A OR V MYOCARDIAL STEROID ELUT SIL

## (undated) DEVICE — WAX SURG 2.5GM HEMSTAT BNE BEESWAX PARAFFIN ISO PALMITATE

## (undated) DEVICE — CLIP SM RED INTERN HMOCLP TITAN LIGATING

## (undated) DEVICE — DRAIN SURG SGL COLL PT TB FOR ATS BG OASIS

## (undated) DEVICE — SUTURE PROL 7-0 L18IN NONABSORBABLE BLU L9.3MM BV-1 3/8 CIR M8701

## (undated) DEVICE — CHLORAPREP 26ML ORANGE

## (undated) DEVICE — ENDOSCOPY KIT: Brand: MEDLINE INDUSTRIES, INC.

## (undated) DEVICE — SUTURE VCRL 2-0 L36IN ABSRB UD CTX L48MM 1/2 CIR TAPERPOINT J979H

## (undated) DEVICE — SPONGE LAP W18XL18IN WHT COT 4 PLY FLD STRUNG RADPQ DISP ST

## (undated) DEVICE — SUTURE ETHIB EXCL X BR GRN V-7 DA 2-0 30 PX977 PX977H

## (undated) DEVICE — Z INACTIVE USE 2641837 CLIP LIG M BLU TI HRT SHP WIRE HORZ 600 PER BX

## (undated) DEVICE — SUTURE SURGLON SZ 1 L30IN NONABSORBABLE BLK NO NDL NYL PRE 8886191971

## (undated) DEVICE — BLOOD TRANSFUSION FILTER: Brand: HAEMONETICS

## (undated) DEVICE — RETRACTOR SURG INSRT SUT HLD OCTOBASE

## (undated) DEVICE — BLANKET THER AD W24XL60IN FAB COVERING SUP SFT ULT THN LTWT

## (undated) DEVICE — GLOVE SURG SZ 65 L12IN FNGR THK79MIL GRN LTX FREE

## (undated) DEVICE — TOTAL TRAY, 16FR 10ML SIL FOLEY, URN: Brand: MEDLINE

## (undated) DEVICE — TUBING INSUFFLATOR HEAT HI FLO SET PNEUMOCLEAR

## (undated) DEVICE — KIT INTRO 9FR L4IN POLYUR PERC SHTH RADPQ W INTEGR HEMSTAS

## (undated) DEVICE — THORACIC CATHETER, STRAIGHT, SILICONE, WITH CLOTSTOP®: Brand: AXIOM® ATRAUM™ WITH CLOTSTOP®

## (undated) DEVICE — Device: Brand: VIRTUOSAPH PLUS WITH RADIAL INDICATION

## (undated) DEVICE — DEVICE RESUS AD TB L40IN SELF INFL MASK TEXT BG DBL SWVL EL

## (undated) DEVICE — FOGARTY - HYDRAGRIP SURGICAL - CLAMP INSERTS: Brand: FOGARTY SOFTJAW

## (undated) DEVICE — SWAN-GANZ CCOMBO V THERMODILUTION CATHETER: Brand: SWAN-GANZ CCOMBO V

## (undated) DEVICE — TUBING SUCT 9 11FR L475IN RIG SHFT MINI SUC TIP DLP

## (undated) DEVICE — BLADE OPHTH GRN ROUNDED TIP 1 SIDE SHRP GRINDLESS MINI-BLDE

## (undated) DEVICE — ADHESIVE SKIN CLSR 0.7ML TOP DERMBND ADV

## (undated) DEVICE — CANNULA SAPH VEIN OPQ BLNT 3MM

## (undated) DEVICE — SOLUTION IV IRRIG POUR BRL 0.9% SODIUM CHL 2F7124

## (undated) DEVICE — TOWEL,OR,DSP,ST,BLUE,STD,6/PK,12PK/CS: Brand: MEDLINE

## (undated) DEVICE — ZINACTIVE USE 2539609 APPLICATOR MEDICATED 10.5 CC SOLUTION HI LT ORNG CHLORAPREP

## (undated) DEVICE — GOWN,SIRUS,FABRNF,RAGLAN,L,ST,30/CS: Brand: MEDLINE

## (undated) DEVICE — SUTURE NONABSORBABLE MONOFILAMENT 6-0 C-1 4X18 IN PROLENE M8718

## (undated) DEVICE — CANNULA ART 20FR NVENT 3 8IN CONN EOPA 3D

## (undated) DEVICE — GLOVE SURG SZ 65 THK91MIL LTX FREE SYN POLYISOPRENE

## (undated) DEVICE — MPS® DELIVERY SET W/ARREST AGENT AND ADDITIVE CASSETTES, HEAT EXCHANGER & 10 FT. DELIVERY TUBING: Brand: MPS

## (undated) DEVICE — SUTURE NRLN SZ 1 L18IN NONABSORBABLE BLK L36MM CT-1 1/2 CIR C520D

## (undated) DEVICE — GLOVE ORANGE PI 7   MSG9070

## (undated) DEVICE — CANNULA PERF L15IN DIA29FR VEN 3 STG THN WALL DSGN W  VENT

## (undated) DEVICE — SET ADMIN PRIMING 67ML L105IN NVENT 180UM FLTR 3 RLER CLMP

## (undated) DEVICE — CANNULA PERF L5.5IN DIA9FR AORT ROOT AG STD TIP W/ VENT LN

## (undated) DEVICE — PLEDGET VASC W3/16XL3/8IN THK1/16IN PTFE SFT

## (undated) DEVICE — SUTURE PROL SZ 4-0 L36IN NONABSORBABLE BLU L26MM SH 1/2 CIR 8521H

## (undated) DEVICE — SET ADMIN L105IN 10GTT 3 NDL FREE CK VLV 2 PC M LUERLOCK

## (undated) DEVICE — CATHETER ETER IV L1IN OD22GA POLYUR PERIPH WNG HUB SFTY SHLD

## (undated) DEVICE — CONNECTOR DRNGE W3/8-0.5XH3/16XL3/16IN 2:1 SIL CARD STR

## (undated) DEVICE — FORCEPS BX JUMBO L240CM DIA2.8MM WRK CHN 3.2MM ORNG W/ NDL

## (undated) DEVICE — SENSOR PLSE OXMTR AD CBL L3FT ADH TRANSMISSIVE

## (undated) DEVICE — LINER,SEMI-RIGID,3000CC,50EA/CS: Brand: MEDLINE

## (undated) DEVICE — BLADE SAW STRNM 10X35X0.6MM

## (undated) DEVICE — SUTURE ETHBND EXCEL SZ 2-0 L36IN NONABSORBABLE GRN L26MM SH X523H

## (undated) DEVICE — CONNECTOR PIPE 3/8X1/2IN REDUC STR

## (undated) DEVICE — OPEN HEART PACK: Brand: MEDLINE INDUSTRIES, INC.

## (undated) DEVICE — ELECTRODE ES AD CRDLSS PT RET REM POLYHESIVE

## (undated) DEVICE — GLOVE SURG SZ 7 L12IN FNGR THK94MIL TRNSLUC YEL LTX HYDRGEL

## (undated) DEVICE — ELECTRODE ES L2.75IN S STL INSUL BLDE W/ SL EDGE

## (undated) DEVICE — BLADE ES L2.75IN ELASTOMERIC COAT DURABLE BEND UPTO 90DEG

## (undated) DEVICE — INTENDED FOR TISSUE SEPARATION, AND OTHER PROCEDURES THAT REQUIRE A SHARP SURGICAL BLADE TO PUNCTURE OR CUT.: Brand: BARD-PARKER ® STAINLESS STEEL BLADES

## (undated) DEVICE — GLOVE SURG SZ 6 THK91MIL LTX FREE SYN POLYISOPRENE ANTI

## (undated) DEVICE — MARKER SURG SKIN UTIL REGULAR/FINE 2 TIP W/ RUL AND 9 LBL

## (undated) DEVICE — KIT CVC AD 7FR L20CM POLYUR BLU FLEXTIP ANTIMIC MULTILUMEN

## (undated) DEVICE — DRAPE,UTILITY,XL,4/PK,STERILE: Brand: MEDLINE

## (undated) DEVICE — DRAIN,WOUND,ROUND,24FR,5/16",FULL-FLUTED: Brand: MEDLINE

## (undated) DEVICE — SENSOR OXMTR SM AD DISP FOR INVOS SYS

## (undated) DEVICE — Device

## (undated) DEVICE — SYRINGE MED 10ML LUERLOCK TIP W/O SFTY DISP

## (undated) DEVICE — SET ADMIN 25ML L117IN PMP MOD CK VLV RLER CLMP 2 SMRTSITE

## (undated) DEVICE — SNARE VASC L240CM LOOP W10MM SHTH DIA2.4MM RND STIFF CLD

## (undated) DEVICE — SUTURE MCRYL SZ 3-0 L27IN ABSRB UD L24MM PS-1 3/8 CIR PRIM Y936H

## (undated) DEVICE — KIT,ANTI FOG,W/SPONGE & FLUID,SOFT PACK: Brand: MEDLINE

## (undated) DEVICE — BAG AUTOTRNS 600ML BLD SGL CHMBR 3 PRT PLAS DEHP PVC

## (undated) DEVICE — 3M™ STERI-DRAPE™ INSTRUMENT POUCH 1018: Brand: STERI-DRAPE™

## (undated) DEVICE — TOWEL,OR,DSP,ST,WHITE,DLX,XR,4/PK,20PK/C: Brand: MEDLINE

## (undated) DEVICE — SUTURE S STL SZ 5 L18IN NONABSORBABLE SIL V-40 L48MM 1/2 M650G

## (undated) DEVICE — SUTURE VCRL SZ 2 L27IN ABSRB UD L65MM TP-1 1/2 CIR J849G

## (undated) DEVICE — SUTURE S STL SZ 5 L18IN NONABSORBABLE SIL L48MM CCS 1/4 CIR M657G

## (undated) DEVICE — ROTATING SURGICAL PUNCHES, 1 PER POUCH: Brand: A&E MEDICAL / ROTATING SURGICAL PUNCHES

## (undated) DEVICE — DECANTER FLD 9IN ST BG FOR ASEP TRNSF OF FLD